# Patient Record
Sex: MALE | Race: WHITE | Employment: OTHER | URBAN - METROPOLITAN AREA
[De-identification: names, ages, dates, MRNs, and addresses within clinical notes are randomized per-mention and may not be internally consistent; named-entity substitution may affect disease eponyms.]

---

## 2017-07-08 ENCOUNTER — HOSPITAL ENCOUNTER (EMERGENCY)
Facility: CLINIC | Age: 82
Discharge: LEFT BEFORE DISCHARGE | End: 2017-07-08
Attending: EMERGENCY MEDICINE | Admitting: EMERGENCY MEDICINE
Payer: MEDICARE

## 2017-07-08 VITALS
BODY MASS INDEX: 27.82 KG/M2 | RESPIRATION RATE: 16 BRPM | OXYGEN SATURATION: 93 % | TEMPERATURE: 97.8 F | HEART RATE: 49 BPM | WEIGHT: 167 LBS | SYSTOLIC BLOOD PRESSURE: 150 MMHG | HEIGHT: 65 IN | DIASTOLIC BLOOD PRESSURE: 63 MMHG

## 2017-07-08 DIAGNOSIS — R55 SYNCOPE AND COLLAPSE: ICD-10-CM

## 2017-07-08 LAB
ANION GAP SERPL CALCULATED.3IONS-SCNC: 6 MMOL/L (ref 3–14)
BASOPHILS # BLD AUTO: 0 10E9/L (ref 0–0.2)
BASOPHILS NFR BLD AUTO: 0.3 %
BUN SERPL-MCNC: 23 MG/DL (ref 7–30)
CALCIUM SERPL-MCNC: 8.3 MG/DL (ref 8.5–10.1)
CHLORIDE SERPL-SCNC: 109 MMOL/L (ref 94–109)
CO2 SERPL-SCNC: 26 MMOL/L (ref 20–32)
CREAT SERPL-MCNC: 1.33 MG/DL (ref 0.66–1.25)
DIFFERENTIAL METHOD BLD: ABNORMAL
EOSINOPHIL # BLD AUTO: 0.5 10E9/L (ref 0–0.7)
EOSINOPHIL NFR BLD AUTO: 8 %
ERYTHROCYTE [DISTWIDTH] IN BLOOD BY AUTOMATED COUNT: 14.1 % (ref 10–15)
GFR SERPL CREATININE-BSD FRML MDRD: 51 ML/MIN/1.7M2
GLUCOSE SERPL-MCNC: 151 MG/DL (ref 70–99)
HCT VFR BLD AUTO: 34.8 % (ref 40–53)
HGB BLD-MCNC: 11.7 G/DL (ref 13.3–17.7)
IMM GRANULOCYTES # BLD: 0 10E9/L (ref 0–0.4)
IMM GRANULOCYTES NFR BLD: 0.2 %
INTERPRETATION ECG - MUSE: NORMAL
LYMPHOCYTES # BLD AUTO: 2 10E9/L (ref 0.8–5.3)
LYMPHOCYTES NFR BLD AUTO: 31.6 %
MCH RBC QN AUTO: 32.2 PG (ref 26.5–33)
MCHC RBC AUTO-ENTMCNC: 33.6 G/DL (ref 31.5–36.5)
MCV RBC AUTO: 96 FL (ref 78–100)
MONOCYTES # BLD AUTO: 0.5 10E9/L (ref 0–1.3)
MONOCYTES NFR BLD AUTO: 8.6 %
NEUTROPHILS # BLD AUTO: 3.2 10E9/L (ref 1.6–8.3)
NEUTROPHILS NFR BLD AUTO: 51.3 %
NRBC # BLD AUTO: 0 10*3/UL
NRBC BLD AUTO-RTO: 0 /100
PHENYTOIN SERPL-MCNC: 15 MG/L (ref 10–20)
PLATELET # BLD AUTO: 136 10E9/L (ref 150–450)
POTASSIUM SERPL-SCNC: 4 MMOL/L (ref 3.4–5.3)
RBC # BLD AUTO: 3.63 10E12/L (ref 4.4–5.9)
SODIUM SERPL-SCNC: 141 MMOL/L (ref 133–144)
TROPONIN I SERPL-MCNC: NORMAL UG/L (ref 0–0.04)
WBC # BLD AUTO: 6.3 10E9/L (ref 4–11)

## 2017-07-08 PROCEDURE — 80048 BASIC METABOLIC PNL TOTAL CA: CPT | Performed by: EMERGENCY MEDICINE

## 2017-07-08 PROCEDURE — 80185 ASSAY OF PHENYTOIN TOTAL: CPT | Performed by: EMERGENCY MEDICINE

## 2017-07-08 PROCEDURE — 99284 EMERGENCY DEPT VISIT MOD MDM: CPT

## 2017-07-08 PROCEDURE — 93005 ELECTROCARDIOGRAM TRACING: CPT

## 2017-07-08 PROCEDURE — 85025 COMPLETE CBC W/AUTO DIFF WBC: CPT | Performed by: EMERGENCY MEDICINE

## 2017-07-08 PROCEDURE — 84484 ASSAY OF TROPONIN QUANT: CPT | Performed by: EMERGENCY MEDICINE

## 2017-07-08 RX ORDER — LISINOPRIL 10 MG/1
10 TABLET ORAL EVERY MORNING
Status: ON HOLD | COMMUNITY
Start: 2017-04-06 | End: 2021-01-01

## 2017-07-08 RX ORDER — PHENYTOIN SODIUM 100 MG/1
100 CAPSULE, EXTENDED RELEASE ORAL 3 TIMES DAILY
Status: ON HOLD | COMMUNITY
Start: 2017-04-06 | End: 2021-01-01

## 2017-07-08 RX ORDER — ATORVASTATIN CALCIUM 40 MG/1
40 TABLET, FILM COATED ORAL EVERY EVENING
Status: ON HOLD | COMMUNITY
Start: 2017-04-06 | End: 2019-11-16

## 2017-07-08 RX ORDER — LIDOCAINE 40 MG/G
CREAM TOPICAL
Status: DISCONTINUED | OUTPATIENT
Start: 2017-07-08 | End: 2017-07-08 | Stop reason: HOSPADM

## 2017-07-08 ASSESSMENT — ENCOUNTER SYMPTOMS
CONFUSION: 0
VOMITING: 0
FEVER: 0
HEADACHES: 0
TREMORS: 0
WOUND: 0
SPEECH DIFFICULTY: 0

## 2017-07-09 NOTE — ED NOTES
Pt called neighbor for ride, escorted to lobby in wheelchair.  Pt signed out against medical advice.

## 2017-07-09 NOTE — DISCHARGE INSTRUCTIONS
Discharge Instructions  Syncope    Syncope (fainting) is a sudden, short loss of consciousness (passing out spell). People will usually fall to the ground when they faint or slump over if seated.  At this time your doctor does not find that your fainting spell is a sign of anything dangerous or life-threatening.  However, sometimes the signs of serious illness do not show up right away.  If you have new or worse symptoms, you may need to be seen again in the Emergency Department or by your primary doctor. Be sure to follow up as directed, or at least within 1 week.      Return to the Emergency Department if:    You faint again.     You have any significant bleeding.    You have chest pain or fast heartbeat, or if your heartbeat is irregular or skipping beats.    You feel short of breath.    You cough up any blood.    You have belly (abdominal) pain or unusual back pain.    You have ongoing vomiting (throwing up) or diarrhea, or have a black or tarry bowel movement or blood in the bowels or blood in your vomit.    You have a fever over 101 degrees.    You lose feeling or cannot move a part of your body or cannot talk normally.    You are confused, have a headache, cannot see well, or have a seizure.    DO NOT DRIVE. CALL 911 INSTEAD!    What can I do to help myself?    Follow any specific instructions that your provider discussed with you.    If you feel light-headed, make sure to sit down right away, even if you have to sit on the floor.    Follow up with your regular medical doctor as discussed for further management. This may include lowering your blood pressure medications, insulin or other diabetic medications, checking your blood sugar more frequently, and drinking more fluids, taking medicines for vomiting or diarrhea or getting up slower.  If you were given a prescription for medicine here today, be sure to read all of the information (including the package insert) that comes with your prescription.  This  will include important information about the medicine, its side effects, and any warnings that you need to know about.  The pharmacist who fills the prescription can provide more information and answer questions you may have about the medicine.  If you have questions or concerns that the pharmacist cannot address, please call or return to the Emergency Department.     Opioid Medication Information    Pain medications are among the most commonly prescribed medicines, so we are including this information for all our patients. If you did not receive pain medication or get a prescription for pain medicine, you can ignore it.     You may have been given a prescription for an opioid (narcotic) pain medicine and/or have received a pain medicine while here in the Emergency Department. These medicines can make you drowsy or impaired. You must not drive, operate dangerous equipment, or engage in any other dangerous activities while taking these medications. If you drive while taking these medications, you could be arrested for DUI, or driving under the influence. Do not drink any alcohol while you are taking these medications.     Opioid pain medications can cause addiction. If you have a history of chemical dependency of any type, you are at a higher risk of becoming addicted to pain medications.  Only take these prescribed medications to treat your pain when all other options have been tried. Take it for as short a time and as few doses as possible. Store your pain pills in a secure place, as they are frequently stolen and provide a dangerous opportunity for children or visitors in your house to start abusing these powerful medications. We will not replace any lost or stolen medicine.  As soon as your pain is better, you should flush all your remaining medication.     Many prescription pain medications contain Tylenol  (acetaminophen), including Vicodin , Tylenol #3 , Norco , Lortab , and Percocet .  You should not take any  extra pills of Tylenol  if you are using these prescription medications or you can get very sick.  Do not ever take more than 3000 mg of acetaminophen in any 24 hour period.    All opioids tend to cause constipation. Drink plenty of water and eat foods that have a lot of fiber, such as fruits, vegetables, prune juice, apple juice and high fiber cereal.  Take a laxative if you don t move your bowels at least every other day. Miralax , Milk of Magnesia, Colace , or Senna  can be used to keep you regular.      Remember that you can always come back to the Emergency Department if you are not able to see your regular doctor in the amount of time listed above, if you get any new symptoms, or if there is anything that worries you.

## 2017-07-09 NOTE — ED NOTES
Pt presents via EMS for evaluation of syncopal episode with stool incontinence. Pt was working out in the yard, felt like he was going to have a BM, then had a syncopal episode. Episode was witnessed by neighbor, who thought they saw seizure like activity. Pt denies any chest pain, shortness of breath, lightheadedness or dizziness.

## 2017-07-09 NOTE — ED PROVIDER NOTES
"  History     Chief Complaint:  Loss of Consciousness    HPI   Kofi Haddad is a 84 year old male with a history of blood pressure problems and seizures who presents to the emergency department via EMS for evaluation of loss of consciousness. Just prior to presentation, the patient was helping his neighbor trying to get wire into a gorge. He states that he was holding the gorge for a while and lost track of time. He reports having to go to the bathroom but did not make it and loss consciousness for an unspecified amount of time. In regards to his neighbors calling EMS, he states that \"it was all for nothing.\" He is adamant that he does not want to stay in the ED for any reason.    Allergies:  NKDA    Medications:    The patient is currently on no regular medications.    Past Medical History:    The patient denies any significant past medical history.    Past Surgical History:    The patient does not have any pertinent past surgical history.    Family History:    No past pertinent family history.    Social History:  The patient denies tobacco or alcohol use.  Marital Status:       Review of Systems   Constitutional: Negative for fever.   Gastrointestinal: Negative for vomiting.   Skin: Negative for wound.   Neurological: Positive for syncope. Negative for tremors, speech difficulty and headaches.   Psychiatric/Behavioral: Negative for confusion.   All other systems reviewed and are negative.      Physical Exam     Patient Vitals for the past 24 hrs:   BP Temp Temp src Pulse Heart Rate Resp SpO2 Height Weight   07/08/17 1917 150/76 97.8  F (36.6  C) Oral (!) 49 - 16 99 % 1.651 m (5' 5\") 75.8 kg (167 lb)          Physical Exam   Constitutional: He is oriented to person, place, and time. He appears well-developed.   HENT:   Head: Normocephalic and atraumatic.   Right Ear: External ear normal.   Mouth/Throat: Oropharynx is clear and moist.   Eyes: Conjunctivae and EOM are normal. Pupils are equal, round, " and reactive to light.   Neck: Normal range of motion. Neck supple. No JVD present.   Cardiovascular: Normal rate and regular rhythm.    Murmur heard.   Systolic murmur is present with a grade of 5/6   Pulmonary/Chest: Effort normal and breath sounds normal.   Abdominal: Soft. Bowel sounds are normal. He exhibits no distension. There is no tenderness. There is no rebound.   Musculoskeletal: Normal range of motion.   Lymphadenopathy:     He has no cervical adenopathy.   Neurological: He is alert and oriented to person, place, and time. He displays normal reflexes. No cranial nerve deficit. He exhibits normal muscle tone. Coordination normal.   Skin: Skin is warm and dry.   Psychiatric: He has a normal mood and affect. His behavior is normal. Judgment normal.   Nursing note and vitals reviewed.        Emergency Department Course   ECG:  Indication: Loss of Consciousness  Time: 19:16  Vent. Rate 49 bpm. PA interval 136. QRS duration 132. QT/QTc 466/420. P-R-T axis 23 -16 8. Sinus bradycardia. Right bundle branch block. Abnormal ECG.  Read time: 19:44    Laboratory:  CBC: WBC: 6.3, HGB: 11.7 (L), PLT: 136 (L)  BMP: Glucose 151 (H), Creatinine 1.33 (H), GFR 51 (L), Calcium 8.3 (L) o/w WNL    Troponin I: <0.015  Phenytoin Level: 15.0    Emergency Department Course:  The patient presents via EMS.  Nursing notes and vitals reviewed.  I performed an exam of the patient as documented above.    Blood drawn. This was sent to the lab for further testing, results above.    19:27 I talked with the patient's daughter about the history of the patient.    19:41 I reevaluated the patient and provided an update in regards to his ED course.      20:21 I called the patient's daughter back and informed her of the plan.    21:05 I informed the daughter that the patient will be discharged to his neighbor.    I personally reviewed the laboratory results with the Patient and answered all related questions prior to discharge.   Findings and  plan explained to the Patient. Patient discharged home with instructions regarding supportive care, medications, and reasons to return. The importance of close follow-up was reviewed.       Impression & Plan      Medical Decision Making:  Kofi Haddad is a 84 year old male who presents after an episode with his neighbor. The patient does have a history of a seizure disorder and is on Dilantin. EMS does mention the possibility of seizure though it does not sound like there was a post ictal state. The patient's EKG shows significant bradycardia at the rate of only 49. This seemed to improve with time. The patient is not on a beta blocker. The patient's exam also consists of a blowing systolic murmur and has a history of known aortic stenosis. He was last imaged more than 11 years ago. The patient was advised admission. My clinical concerns are for cardiac arrhythmia and symptomatic aortic stenosis. The patient adamantly refuses this. I do think that he is within capability to decide about his own health care decision making. His daughter did call from [place] and she did tell me that he never wants to stay in the hospital. The patient was advised of the risks of missing symptomatic aortic stenosis including sudden cardiac death. The patient refused to stay and signed out against medical advise.    1. Syncopy versus seizure  2. Bradycardia resolved  3. History of aortic stenosis      Diagnosis:    ICD-10-CM    1. Syncope and collapse R55        Disposition:  discharged to home    Discharge Medications:  Discharge Medication List as of 7/8/2017  9:39 PM          Blake RADFORD, am serving as a scribe on 7/8/2017 at 7:19 PM to personally document services performed by Hemanth Alvarez MD based on my observations and the provider's statements to me.     7/8/2017   [facility] EMERGENCY DEPARTMENT       Hemanth Alvarez MD  07/14/17 8922

## 2017-07-09 NOTE — ED NOTES
Bed: ED23  Expected date: 7/8/17  Expected time: 7:02 PM  Means of arrival: Ambulance  Comments:  BV1

## 2018-09-30 ENCOUNTER — HOSPITAL ENCOUNTER (INPATIENT)
Facility: CLINIC | Age: 83
LOS: 1 days | Discharge: LEFT BEFORE DISCHARGE | DRG: 307 | End: 2018-10-01
Attending: EMERGENCY MEDICINE | Admitting: HOSPITALIST
Payer: MEDICARE

## 2018-09-30 ENCOUNTER — APPOINTMENT (OUTPATIENT)
Dept: CT IMAGING | Facility: CLINIC | Age: 83
DRG: 307 | End: 2018-09-30
Attending: EMERGENCY MEDICINE
Payer: MEDICARE

## 2018-09-30 ENCOUNTER — APPOINTMENT (OUTPATIENT)
Dept: GENERAL RADIOLOGY | Facility: CLINIC | Age: 83
DRG: 307 | End: 2018-09-30
Attending: EMERGENCY MEDICINE
Payer: MEDICARE

## 2018-09-30 DIAGNOSIS — I15.8 OTHER SECONDARY HYPERTENSION: ICD-10-CM

## 2018-09-30 DIAGNOSIS — R55 SYNCOPE, UNSPECIFIED SYNCOPE TYPE: ICD-10-CM

## 2018-09-30 LAB
ANION GAP SERPL CALCULATED.3IONS-SCNC: 2 MMOL/L (ref 3–14)
BASOPHILS # BLD AUTO: 0 10E9/L (ref 0–0.2)
BASOPHILS NFR BLD AUTO: 0.4 %
BUN SERPL-MCNC: 21 MG/DL (ref 7–30)
CALCIUM SERPL-MCNC: 8.6 MG/DL (ref 8.5–10.1)
CHLORIDE SERPL-SCNC: 106 MMOL/L (ref 94–109)
CO2 SERPL-SCNC: 32 MMOL/L (ref 20–32)
CREAT SERPL-MCNC: 1.23 MG/DL (ref 0.66–1.25)
DIFFERENTIAL METHOD BLD: ABNORMAL
EOSINOPHIL # BLD AUTO: 0.4 10E9/L (ref 0–0.7)
EOSINOPHIL NFR BLD AUTO: 7.9 %
ERYTHROCYTE [DISTWIDTH] IN BLOOD BY AUTOMATED COUNT: 14.2 % (ref 10–15)
GFR SERPL CREATININE-BSD FRML MDRD: 56 ML/MIN/1.7M2
GLUCOSE SERPL-MCNC: 117 MG/DL (ref 70–99)
HCT VFR BLD AUTO: 40.9 % (ref 40–53)
HGB BLD-MCNC: 13.7 G/DL (ref 13.3–17.7)
IMM GRANULOCYTES # BLD: 0 10E9/L (ref 0–0.4)
IMM GRANULOCYTES NFR BLD: 0.2 %
LYMPHOCYTES # BLD AUTO: 1.7 10E9/L (ref 0.8–5.3)
LYMPHOCYTES NFR BLD AUTO: 34.2 %
MCH RBC QN AUTO: 32.2 PG (ref 26.5–33)
MCHC RBC AUTO-ENTMCNC: 33.5 G/DL (ref 31.5–36.5)
MCV RBC AUTO: 96 FL (ref 78–100)
MONOCYTES # BLD AUTO: 0.5 10E9/L (ref 0–1.3)
MONOCYTES NFR BLD AUTO: 10 %
NEUTROPHILS # BLD AUTO: 2.4 10E9/L (ref 1.6–8.3)
NEUTROPHILS NFR BLD AUTO: 47.3 %
NRBC # BLD AUTO: 0 10*3/UL
NRBC BLD AUTO-RTO: 0 /100
PLATELET # BLD AUTO: 155 10E9/L (ref 150–450)
POTASSIUM SERPL-SCNC: 4.4 MMOL/L (ref 3.4–5.3)
RBC # BLD AUTO: 4.25 10E12/L (ref 4.4–5.9)
SODIUM SERPL-SCNC: 140 MMOL/L (ref 133–144)
TROPONIN I SERPL-MCNC: <0.015 UG/L (ref 0–0.04)
TROPONIN I SERPL-MCNC: <0.015 UG/L (ref 0–0.04)
WBC # BLD AUTO: 5.1 10E9/L (ref 4–11)

## 2018-09-30 PROCEDURE — 85025 COMPLETE CBC W/AUTO DIFF WBC: CPT | Performed by: EMERGENCY MEDICINE

## 2018-09-30 PROCEDURE — 99223 1ST HOSP IP/OBS HIGH 75: CPT | Mod: AI | Performed by: HOSPITALIST

## 2018-09-30 PROCEDURE — A9270 NON-COVERED ITEM OR SERVICE: HCPCS | Mod: GY | Performed by: EMERGENCY MEDICINE

## 2018-09-30 PROCEDURE — A9270 NON-COVERED ITEM OR SERVICE: HCPCS | Mod: GY | Performed by: HOSPITALIST

## 2018-09-30 PROCEDURE — 25000132 ZZH RX MED GY IP 250 OP 250 PS 637: Mod: GY | Performed by: HOSPITALIST

## 2018-09-30 PROCEDURE — 25000132 ZZH RX MED GY IP 250 OP 250 PS 637: Mod: GY | Performed by: EMERGENCY MEDICINE

## 2018-09-30 PROCEDURE — 70450 CT HEAD/BRAIN W/O DYE: CPT

## 2018-09-30 PROCEDURE — 93005 ELECTROCARDIOGRAM TRACING: CPT

## 2018-09-30 PROCEDURE — 80048 BASIC METABOLIC PNL TOTAL CA: CPT | Performed by: EMERGENCY MEDICINE

## 2018-09-30 PROCEDURE — 84484 ASSAY OF TROPONIN QUANT: CPT | Performed by: EMERGENCY MEDICINE

## 2018-09-30 PROCEDURE — 25000128 H RX IP 250 OP 636: Performed by: HOSPITALIST

## 2018-09-30 PROCEDURE — 99222 1ST HOSP IP/OBS MODERATE 55: CPT | Mod: 25 | Performed by: INTERNAL MEDICINE

## 2018-09-30 PROCEDURE — 36415 COLL VENOUS BLD VENIPUNCTURE: CPT | Performed by: HOSPITALIST

## 2018-09-30 PROCEDURE — 71046 X-RAY EXAM CHEST 2 VIEWS: CPT

## 2018-09-30 PROCEDURE — 99285 EMERGENCY DEPT VISIT HI MDM: CPT | Mod: 25

## 2018-09-30 PROCEDURE — 84484 ASSAY OF TROPONIN QUANT: CPT | Performed by: HOSPITALIST

## 2018-09-30 PROCEDURE — 12000007 ZZH R&B INTERMEDIATE

## 2018-09-30 RX ORDER — CLONIDINE HYDROCHLORIDE 0.1 MG/1
0.1 TABLET ORAL ONCE
Status: COMPLETED | OUTPATIENT
Start: 2018-09-30 | End: 2018-09-30

## 2018-09-30 RX ORDER — PHENYTOIN SODIUM 100 MG/1
100 CAPSULE, EXTENDED RELEASE ORAL 3 TIMES DAILY
Status: DISCONTINUED | OUTPATIENT
Start: 2018-09-30 | End: 2018-10-01 | Stop reason: HOSPADM

## 2018-09-30 RX ORDER — ONDANSETRON 2 MG/ML
4 INJECTION INTRAMUSCULAR; INTRAVENOUS EVERY 6 HOURS PRN
Status: DISCONTINUED | OUTPATIENT
Start: 2018-09-30 | End: 2018-10-01 | Stop reason: HOSPADM

## 2018-09-30 RX ORDER — CALCIUM CARBONATE 500 MG/1
500 TABLET, CHEWABLE ORAL DAILY PRN
Status: DISCONTINUED | OUTPATIENT
Start: 2018-09-30 | End: 2018-10-01 | Stop reason: HOSPADM

## 2018-09-30 RX ORDER — CALCIUM CARBONATE 500 MG/1
1 TABLET, CHEWABLE ORAL DAILY PRN
Status: ON HOLD | COMMUNITY
End: 2021-01-01

## 2018-09-30 RX ORDER — ATORVASTATIN CALCIUM 40 MG/1
40 TABLET, FILM COATED ORAL EVERY EVENING
Status: DISCONTINUED | OUTPATIENT
Start: 2018-09-30 | End: 2018-10-01 | Stop reason: HOSPADM

## 2018-09-30 RX ORDER — LISINOPRIL 10 MG/1
10 TABLET ORAL EVERY MORNING
Status: DISCONTINUED | OUTPATIENT
Start: 2018-10-01 | End: 2018-10-01

## 2018-09-30 RX ORDER — NALOXONE HYDROCHLORIDE 0.4 MG/ML
.1-.4 INJECTION, SOLUTION INTRAMUSCULAR; INTRAVENOUS; SUBCUTANEOUS
Status: DISCONTINUED | OUTPATIENT
Start: 2018-09-30 | End: 2018-10-01 | Stop reason: HOSPADM

## 2018-09-30 RX ORDER — HYDRALAZINE HYDROCHLORIDE 20 MG/ML
10 INJECTION INTRAMUSCULAR; INTRAVENOUS EVERY 4 HOURS PRN
Status: DISCONTINUED | OUTPATIENT
Start: 2018-09-30 | End: 2018-10-01 | Stop reason: HOSPADM

## 2018-09-30 RX ORDER — ONDANSETRON 4 MG/1
4 TABLET, ORALLY DISINTEGRATING ORAL EVERY 6 HOURS PRN
Status: DISCONTINUED | OUTPATIENT
Start: 2018-09-30 | End: 2018-10-01 | Stop reason: HOSPADM

## 2018-09-30 RX ADMIN — PHENYTOIN SODIUM 100 MG: 100 CAPSULE ORAL at 22:59

## 2018-09-30 RX ADMIN — ATORVASTATIN CALCIUM 40 MG: 40 TABLET, FILM COATED ORAL at 20:06

## 2018-09-30 RX ADMIN — HYDRALAZINE HYDROCHLORIDE 10 MG: 20 INJECTION INTRAMUSCULAR; INTRAVENOUS at 20:10

## 2018-09-30 RX ADMIN — CLONIDINE 1 PATCH: 0.3 PATCH TRANSDERMAL at 11:51

## 2018-09-30 RX ADMIN — PHENYTOIN SODIUM 100 MG: 100 CAPSULE ORAL at 17:13

## 2018-09-30 RX ADMIN — CLONIDINE HYDROCHLORIDE 0.1 MG: 0.1 TABLET ORAL at 10:50

## 2018-09-30 ASSESSMENT — ENCOUNTER SYMPTOMS
DIARRHEA: 0
VOMITING: 0
SHORTNESS OF BREATH: 0

## 2018-09-30 ASSESSMENT — ACTIVITIES OF DAILY LIVING (ADL)
ADLS_ACUITY_SCORE: 8
ADLS_ACUITY_SCORE: 10

## 2018-09-30 NOTE — H&P
[facility]    History and Physical  Hospitalist     Date of Admission:  9/30/2018  Date of Service (when I saw the patient): 09/30/18  Provider: Erick Carey MD      Chief Complaint   Fainting in [employer]    History is obtained from the patient, electronic health record and emergency department physician    History of Present Illness   Kofi Haddad is a 85 year old male who has a past medical history of Prostate Cancer s/p prostatectomy; High cholesterol; Hypertension; and Seizures (H). He presents to ED with elevated BP after a fainting episode (syncope) in his [employer] this AM. According   to his daughter who is in the room at the moment of my visit, 1 year ago he had a similar episode in a neighbor's house.   He denies loss of consciousness but it is not totally reliable because he wants to leave to home ASAP.  He denies premonitory signs.  No chest pain, no shortness of breath, no lightheaded or dizziness.  No sweatiness.  No visual abnormalities, no weakness, numbness or tingling anywhere in his body.  No headache.  No nausea or vomiting.  He was transported to the emergency department by the EMS crew who found conscious on the scene but bradycardic.  He is not taking beta-blockers.  He has been bradycardic since arrival to the hospital.  His blood pressure 223/77, heart rate 45, respiratory rate 16 and oxygen saturation 98% not on supplement.  He was treated in the emergency department with clonidine p.o. and then a patch was placed. According to Dr Newosme from ED, EMS' crew was told by parishioners in [employer] that no seizure activity was witnessed.   His lab workup is significant for:  Normal CBC.  Chemistry with creatinine 1.23, GFR 56 and rest of the values within normal limits.  First troponin nondetectable.  Glycemia 117..  EKG shows sinus bradycardia with first-degree AV block and RBBB.    Past Medical History    I have reviewed this patient's medical history and updated it with  pertinent information if needed.   Past Medical History:   Diagnosis Date     Cancer (H)      High cholesterol      Hypertension      Seizures (H)        Assessment & Plan   Kofi Haddad is a 85 year old male who presents with high blood pressure, bradycardia after fainting episode in his [employer] today.  In the past he has had similar events by family report.  He has a history of seizure activity well controlled in the last 10 years on Keppra, essential hypertension, hyperlipidemia and history of prostate cancer status post prostatectomy.    1.  Syncope, unclear etiology. Given degree of bradycardia in the absence of beta-blocker it may be a cardiogenic syncope due to brain hypoperfusion.  2.  Hypertensive urgency. Unclear whether it is cause of consequence. SBP very high on arrival.   3.  Essential hypertension.   4.  History of seizures.  5.  HLP.  6.  H/O Prostate cancer.    Plan.  Inpatient.  Telemetry.  Cardiac diet.  Echocardiogram.  Serial troponin.  Falls precautions.  Up with assistance.  Cardiology consult.  Resume home med.  PT/OT A&P.       Code Status   DNR    Primary Care Physician   [facility]      Past Surgical History   Past Surgical History:   Procedure Laterality Date     GENITOURINARY SURGERY         Prior to Admission Medications   Prior to Admission Medications   Prescriptions Last Dose Informant Patient Reported? Taking?   atorvastatin (LIPITOR) 40 MG tablet 9/29/2018 at pm  Yes Yes   Sig: Take 40 mg by mouth every evening    calcium carbonate (TUMS) 500 MG chewable tablet 9/29/2018 at na  Yes Yes   Sig: Take 1 chew tab by mouth daily as needed for heartburn   lisinopril (PRINIVIL/ZESTRIL) 10 MG tablet 9/30/2018 at am  Yes Yes   Sig: Take 10 mg by mouth every morning    phenytoin (DILANTIN) 100 MG CR capsule 9/30/2018 at am  Yes Yes   Sig: Take 100 mg by mouth 3 times daily       Facility-Administered Medications: None     Allergies   No Known Allergies    Social History   I  have personally reviewed the social history with the patient showing.  Social History   Substance Use Topics     Smoking status: Former Smoker     Smokeless tobacco: Never Used     Alcohol use Not on file     Family History   I have reviewed this patient's family history and updated it with pertinent information if needed.   No family history on file.    Review of Systems   Except as noted in the HPI, a 12-system Review of Systems was found to be negative.  Specifically:     General:  No chronic fatigue, unexpected weight gain or weight loss.  ENT:  No ear or sinus infections.  Respiratory:  No wheezing, dyspnea on exertion,  or chronic cough.  CV:  No chest pain, cyanosis, palpitations, dizziness. Fainting.  GI:  No abdominal pain, reflux symptoms, nausea, vomiting or diarrhea.  Allergy / Immunology:  No allergy symptoms (itching, sneezing, watery eyes, rhinorrhea, congestion, or post-nasal drip).  Endocrine:  No hot or cold intolerance, excessive sweating, excessive thirst, or frequent urination.  Skin:  No problems with rashes, sensitive skin, or eczema.  Neurologic:  No headaches, weakness, numbness, dizziness, or seizures.  Musculoskeletal:  No muscle or joint pain.  Lymphatic:  No swollen lymph nodes.  Psychiatric:  No depression, anxiety, or sleep disorder.    Physical Exam   Vital Signs with Ranges  Temp:  [97.8  F (36.6  C)] 97.8  F (36.6  C)  Pulse:  [45] 45  Heart Rate:  [42-53] 49  Resp:  [10-22] 10  BP: (169-234)/() 188/97  SpO2:  [95 %-100 %] 97 %  0 lbs 0 oz    GEN:  Alert, oriented x 3, appears comfortable, NAD.  HEENT:  Normocephalic/atraumatic, no scleral icterus, no nasal discharge, mouth moist.  CV:  Bradycardic rhythm, EAMON II/VI all over with radiation to neck vessels. No JVD.  S1 + S2 noted, no S3 or S4.  LUNGS:  Clear to auscultation bilaterally without rales/rhonchi/wheezing/retractions.  Symmetric chest rise on inhalation noted.  ABD:  Active bowel sounds, soft,  non-tender/non-distended.  No rebound/guarding/rigidity.  EXT:  No edema or cyanosis.  No joint synovitis noted.  SKIN:  Dry to touch, no exanthems noted in the visualized areas.       Data   I personally reviewed no images or EKG's today.  Results for orders placed or performed during the hospital encounter of 09/30/18 (from the past 24 hour(s))   EKG 12 lead   Result Value Ref Range    Interpretation ECG Click View Image link to view waveform and result    CBC with platelets differential   Result Value Ref Range    WBC 5.1 4.0 - 11.0 10e9/L    RBC Count 4.25 (L) 4.4 - 5.9 10e12/L    Hemoglobin 13.7 13.3 - 17.7 g/dL    Hematocrit 40.9 40.0 - 53.0 %    MCV 96 78 - 100 fl    MCH 32.2 26.5 - 33.0 pg    MCHC 33.5 31.5 - 36.5 g/dL    RDW 14.2 10.0 - 15.0 %    Platelet Count 155 150 - 450 10e9/L    Diff Method Automated Method     % Neutrophils 47.3 %    % Lymphocytes 34.2 %    % Monocytes 10.0 %    % Eosinophils 7.9 %    % Basophils 0.4 %    % Immature Granulocytes 0.2 %    Nucleated RBCs 0 0 /100    Absolute Neutrophil 2.4 1.6 - 8.3 10e9/L    Absolute Lymphocytes 1.7 0.8 - 5.3 10e9/L    Absolute Monocytes 0.5 0.0 - 1.3 10e9/L    Absolute Eosinophils 0.4 0.0 - 0.7 10e9/L    Absolute Basophils 0.0 0.0 - 0.2 10e9/L    Abs Immature Granulocytes 0.0 0 - 0.4 10e9/L    Absolute Nucleated RBC 0.0    Basic metabolic panel   Result Value Ref Range    Sodium 140 133 - 144 mmol/L    Potassium 4.4 3.4 - 5.3 mmol/L    Chloride 106 94 - 109 mmol/L    Carbon Dioxide 32 20 - 32 mmol/L    Anion Gap 2 (L) 3 - 14 mmol/L    Glucose 117 (H) 70 - 99 mg/dL    Urea Nitrogen 21 7 - 30 mg/dL    Creatinine 1.23 0.66 - 1.25 mg/dL    GFR Estimate 56 (L) >60 mL/min/1.7m2    GFR Estimate If Black 68 >60 mL/min/1.7m2    Calcium 8.6 8.5 - 10.1 mg/dL   Troponin I   Result Value Ref Range    Troponin I ES <0.015 0.000 - 0.045 ug/L   XR Chest 2 Views    Narrative    CHEST TWO VIEWS September 30, 2018 10:32 AM     HISTORY: Syncope.    COMPARISON: Frontal  chest x-ray 4/5/2006.     FINDINGS: The cardiac silhouette, pulmonary vasculature, lungs and  pleural spaces are within normal limits.      Impression    IMPRESSION: Clear lungs.     AYSHA MENJIVAR MD   Head CT w/o contrast    Narrative    CT OF THE HEAD WITHOUT CONTRAST September 30, 2018 12:21 PM     HISTORY: Hypertension.    TECHNIQUE: 5 mm thick axial CT images of the head were acquired  without IV contrast material. Radiation dose for this scan was reduced  using automated exposure control, adjustment of the mA and/or kV  according to patient size, or iterative reconstruction technique.    COMPARISON: Head CT 4/3/2006.    FINDINGS: There is moderate diffuse cerebral volume loss. There are  subtle patchy areas of decreased density in the cerebral white matter  bilaterally that are consistent with sequela of chronic small vessel  ischemic disease.    The ventricles and basal cisterns are within normal limits in  configuration given the degree of cerebral volume loss. There is no  midline shift. There are no extra-axial fluid collections.    No intracranial hemorrhage, mass or recent infarct.    The visualized paranasal sinuses are well-aerated. There is no  mastoiditis. There are no fractures of the visualized bones.      Impression    IMPRESSION: Diffuse cerebral volume loss and cerebral white matter  changes consistent with chronic small vessel ischemic disease. No  evidence for acute intracranial pathology.         AYSHA MENJIVAR MD       Disclaimer: This note consists of symbols derived from keyboarding, dictation and/or voice recognition software. As a result, there may be errors in the script that have gone undetected. Please consider this when interpreting information found in this chart.

## 2018-09-30 NOTE — PLAN OF CARE
A&O x 4. Up with assist x 1. Denies pain. SB (HR 38-40's) on tele. BP elevated. Clonidine patch R chest.   PT/OT/Cardiology consult.

## 2018-09-30 NOTE — CONSULTS
Consult Date:  09/30/2018      REQUESTING PHYSICIAN:  Erick Carey MD       REASON FOR CONSULTATION:  Syncope, bradycardia.      CHIEF COMPLAINT:  Dizziness, near syncope/syncope.      HISTORY OF PRESENT ILLNESS:  Mr. Haddad is a pleasant 85-year-old gentleman with history of known severe aortic stenosis, history of bradycardia, history of near-syncope about a year ago, other comorbidities of hypertension, history of seizure disorder, history of prostate cancer, status post prostatectomy.  The patient is admitted because of a history of near-syncope/syncope.  He was in [employer] today and patient tells me that there was a band playing he was standing there for about 15 minutes and then he started feeling dizzy.  He felt it awkward to lie down because others was standing and I think after 2-3 minutes of feeling like this, he eventually fell down.  Although the patient denies that he actually completely passed out, on more detailed conversation with him, I feel that he was unconscious maybe for a few seconds.  He denies any seizure-like activity, any bowel or bladder accident.  Remarkably, the patient had a similar episode of near-syncope about a year ago.        He was seen by Dr. Herbert, his cardiologist, and at that time he had a Holter evaluation and was noted to have bradycardia and chronotropic incompetence.  He also had severe aortic stenosis.  It was felt to be symptomatic severe aortic stenosis.  Option of TAVR was discussed with the patient and patient declined any invasive procedures.  Remarkably, the patient did not recall too much about this conversation, although family did confirm that these conversations did take place and patient declined invasive testing.  Even today, the patient wanted to go home.  Echocardiogram done a year ago showed severe aortic stenosis with a mean gradient of 43 mmHg, mild aortic regurgitation, normal LV function at 65% ejection fraction.        Today upon presentation, the  patient was found to have significant hypertension with systolic blood pressure 223, diastolic 77.  His heart rate was in mid 40s.  In fact, according to the ER notes, EMS found him to have heart rate in mid 40s, sinus bradycardia.  At home it looks like he takes lisinopril 10 mg daily for hypertension.  He was started on clonidine patch and his blood pressure is much better now.  Telemetry shows sinus bradycardia with heart rate in mid-40s.  EKG was personally reviewed by me shows sinus bradycardia with right bundle branch block.  This appears similar to previous EKG done last year.      REVIEW OF SYSTEMS:  A complete review of systems was performed.  The patient tells me that he climbs stairs up and down as he has a 2-story home.  He also does grass morning.  He has noticed some slowing down over the last 1-2 years.  If he climbs stairs fast, he does get a little short of breath.  No chest discomfort at rest or with physical activity.      PAST MEDICAL HISTORY:   1.  Known severe aortic stenosis, follows Dr. Beth Herbert in Cardiology, and in the past, the patient has declined TAVR.   2.  Hypertension.   3.  History of seizure disorder.   4.  History of prostate cancer.      SOCIAL HISTORY:  The patient denies any current tobacco abuse.      FAMILY HISTORY:  Reviewed and noncontributory.      ALLERGIES:  NO KNOWN DRUG ALLERGIES.      MEDICATIONS:     1.  Clonidine patch.  The patient already received a dose of clonidine.   2.  Lisinopril 10 mg daily.   3.  Phenytoin.   4.  Lipitor 40 mg daily.      PHYSICAL EXAMINATION:   VITAL SIGNS:  Blood pressure 150/69 and heart rate 42, respiratory rate 12, 97% on room air.   GENERAL:  The patient appears pleasant, comfortable.   NECK:  Normal JVP, no bruits.   CARDIOVASCULAR:  There is grade 3/6 ejection systolic murmur with very late peaking, S2 is not heard.  It is very soft.  No S3, S4, rub or gallop.   RESPIRATORY:  Clear to auscultation bilaterally.   GASTROINTESTINAL  SYSTEM:  Abdomen soft, nontender.   EXTREMITIES:  No pitting pedal edema.   NEUROLOGIC:  Alert, oriented x3.   PSYCHIATRIC:  Normal affect.   SKIN:  No obvious rash.   HEENT:  No pallor or icterus.      EKG as noted above.      LABORATORY:  Shows troponin less than 0.015, creatinine 1.23.  CBC essentially normal.        IMAGING:  CT head, diffuse cerebral volume loss with cerebral white matter changes consistent with small vessel ischemic disease.      ASSESSMENT AND PLAN:  A pleasant 85-year-old gentleman with known severe aortic stenosis which was diagnosed at least a year ago.  Symptomatic at that time.  Follows with Dr. Herbert in Cardiology in [facility] and has declined diver.  The patient also has other cause of hypertension.  The patient is now admitted with syncope.  He was also found to have significant hypertension.        I had a long discussion with the patient regarding the nature of his presentation which is concerning for possible arrhythmia like a bradyarrhythmia leading to his symptoms.  Also, underlying severe aortic stenosis could very well cause his symptoms, although it was not particularly exertional related.  In fact, the patient was surprised to hear about severe aortic stenosis.  He does not recall about it initially, but the family helped him remember it.  I discussed natural history of symptomatic severe aortic stenosis including increased risk of sudden cardiac death failure. The patient has not decided whether he wants an invasive procedure done like TAVR.  I agree with echocardiogram.        Given underlying sinus bradycardia, clonidine is not an optimal choice.  Consider using alternative medications like hydralazine, further increasing dose of lisinopril.  Diuretics can be used like hydrochlorothiazide and spironolactone can also be used, but I will defer further management of hypertension to inpatient medicine team as they are.  At this time, there is no clearcut  indication of pacemaker, but underlying conduction system disease cannot be completely ruled out.  I agree with continuing telemetry.   1.  Admitted with syncope.  This could be due to underlying severe aortic stenosis.  Bradyarrhythmia is also a possibility.   2.  Known severe aortic stenosis, symptomatic with dyspnea on exertion, syncope and overall gradual slowing down.  The patient follows with Dr. Herbert in Cardiology.  In the past has declined TAVR workup.   3.  Hypertension significantly hypertensive at admission.  Management as per inpatient medicine team.      RECOMMENDATIONS:   1.  Agree with echocardiogram and telemetry.   2.  Consider using alternative antihypertensive medication in place of clonidine as it can further worsen bradycardia.  Can consider increasing dose of lisinopril or using hydralazine, and even diuretics.  I would avoid using preload reduction agents like nitrates given underlying severe aortic stenosis.      Thank you for involving me in the care of Mr. Gómez.  Eventually, the patient can follow with his primary cardiologist if he changes his mind regarding TAVR/aortic valve replacement.  Plan was extensively discussed with the patient and his family.         CITLALI MILLIGAN MD             D: 2018   T: 2018   MT: ESTEPHANIE      Name:     BABS MEYER   MRN:      [id number]        Account:       [id number]   :      1933           Consult Date:  2018      Document: [id number]

## 2018-09-30 NOTE — ED TRIAGE NOTES
Patient with episode of syncope while at [employer]. He denies hitting his head, denies other injuries. Heart rate found to be 40's, NSB. He has a history of seizures, states he took his seizure medication as well as other meds this morning. Arrives alert and oriented x 4, denies pain, heart rate = 45. Hypertensive.

## 2018-09-30 NOTE — PHARMACY-ADMISSION MEDICATION HISTORY
Admission medication history interview status for this patient is complete. See [facility] admission navigator for allergy information, prior to admission medications and immunization status.     Medication history interview source(s):Patient  Medication history resources (including written lists, pill bottles, clinic record):[facility]  Primary pharmacy:[facility]    Changes made to PTA medication list:  Added: Tums, added frequencies for all meds  Deleted: None  Changed: None    Actions taken by pharmacist (provider contacted, etc):Contacted pharmacy, [phone], since the patient did not know what the med names were or strengths. Patient knew he was taking 1 blood pressure med QD, one seizure med TID, once cholesterol med every day, and tums as needed    Additional medication history information:Patient denied using any vitamins, minerals, supplements, herbals, patches, inhalers, etc. Patient did have his meds this AM.    Medication reconciliation/reorder completed by provider prior to medication history? No    Do you take OTC medications (eg tylenol, ibuprofen, fish oil, eye/ear drops, etc)? Y(Y/N)    For patients on insulin therapy: N (Y/N)        Prior to Admission medications    Medication Sig Last Dose Taking? Auth Provider   atorvastatin (LIPITOR) 40 MG tablet Take 40 mg by mouth every evening  9/29/2018 at pm Yes Reported, Patient   calcium carbonate (TUMS) 500 MG chewable tablet Take 1 chew tab by mouth daily as needed for heartburn 9/29/2018 at na Yes Unknown, Entered By History   lisinopril (PRINIVIL/ZESTRIL) 10 MG tablet Take 10 mg by mouth every morning  9/30/2018 at am Yes Reported, Patient   phenytoin (DILANTIN) 100 MG CR capsule Take 100 mg by mouth 3 times daily  9/30/2018 at am Yes Reported, Patient

## 2018-09-30 NOTE — ED NOTES
Bed: ED03  Expected date: 9/30/18  Expected time: 9:40 AM  Means of arrival: Ambulance  Comments:  JORGE 2

## 2018-09-30 NOTE — ED NOTES
[facility]  ED Nurse Handoff Report    Kofi Haddad is a 85 year old male   ED Chief complaint: Loss of Consciousness  . ED Diagnosis:   Final diagnoses:   Other secondary hypertension   Syncope, unspecified syncope type     Allergies: No Known Allergies    Code Status: Full Code  Activity level - Baseline/Home:  Independent. Activity Level - Current:   Independent. Lift room needed: No. Bariatric: No   Needed: No   Isolation: No. Infection: Not Applicable.     Vital Signs:   Vitals:    09/30/18 1145 09/30/18 1200 09/30/18 1230 09/30/18 1300   BP: (!) 206/112 (!) 205/109 169/84 (!) 188/97   Pulse:       Resp: 13 13 22 10   Temp:       TempSrc:       SpO2:   97% 97%       Cardiac Rhythm:  ,   Cardiac  Cardiac Rhythm: Sinus bradycardia  Pain level: 0-10 Pain Scale: 0  Patient confused: No. Patient Falls Risk: Yes.   Elimination Status: has not voided in ED  Patient Report - Initial Complaint: Patient with episode of syncope while at [employer]. He denies hitting his head, denies other injuries. Heart rate found to be 40's, NSB. He has a history of seizures, states he took his seizure medication as well as other meds this morning. Arrives alert and oriented x 4, denies pain, heart rate = 45. Hypertensive. . Focused Assessment:   10:00 Cardiac Review of Systems (Cardiac) - Cardiac Signs/Symptoms: syncope  Chest Pain Assessment - Rating (0-10): 0  Cardiac Monitoring - EKG Monitoring: Yes Cardiac Regularity: Regular Cardiac Rhythm: SB BLAKE        Tests Performed: CT, labs, chest xray, EKG. Abnormal Results:   Labs Ordered and Resulted from Time of ED Arrival Up to the Time of Departure from the ED   CBC WITH PLATELETS DIFFERENTIAL - Abnormal; Notable for the following:        Result Value    RBC Count 4.25 (*)     All other components within normal limits   BASIC METABOLIC PANEL - Abnormal; Notable for the following:     Anion Gap 2 (*)     Glucose 117 (*)     GFR Estimate 56 (*)     All other  components within normal limits   TROPONIN I     Head CT w/o contrast   Final Result   IMPRESSION: Diffuse cerebral volume loss and cerebral white matter   changes consistent with chronic small vessel ischemic disease. No   evidence for acute intracranial pathology.             AYSHA MENJIVAR MD      XR Chest 2 Views   Final Result   IMPRESSION: Clear lungs.       AYSHA MENJIVAR MD         Treatments provided: cardiac monitor, bp monitoring, bp meds  Family Comments: he has gotten a hold of his daughter Genie and she is taking care of his dog. Is aware of his admit to hospital.  and his wife in to visit in ED  OBS brochure/video discussed/provided to patient:  No  ED Medications:   Medications   cloNIDine (CATAPRES-TTS) Patch in Place (not administered)   cloNIDine (CATAPRES-TTS) patch REMOVAL (not administered)   cloNIDine (CATAPRES-TTS3) 0.3 MG/24HR WK patch 1 patch (1 patch Transdermal Given 9/30/18 1151)   cloNIDine (CATAPRES) tablet 0.1 mg (0.1 mg Oral Given 9/30/18 1050)     Drips infusing:  No  For the majority of the shift, the patient's behavior Green. Interventions performed were clonodine tablet and patch.     Severe Sepsis OR Septic Shock Diagnosis Present: No      ED Nurse Name/Phone Number: Jeannie GRIS Vernon,   1:07 PM    RECEIVING UNIT ED HANDOFF REVIEW    Above ED Nurse Handoff Report was reviewed: Yes  Reviewed by: Majo Messina on September 30, 2018 at 1:19 PM

## 2018-09-30 NOTE — ED PROVIDER NOTES
History     Chief Complaint:  Loss of Consiousness    HPI   Kofi Haddad is a 85 year old male, with history of prostate and skin cancer, hyperlipidemia, hypertension, seizure disorder, bradycardia, severe aortic stenosis and CKD, who presents via EMS to the emergency department for evaluation of a syncopal episode at [facility] prior to arrival. EMS was called and patient had a spontaneous return to baseline with sinus bradycardia to mid 40's per EMS. Patient denies hitting his head, experiencing chest pain, shortness of breath, vomiting, diarrhea, or any other injuries or pain. Patient noted he felt baseline last night and took his normal regiment of medications last night and this morning, including his Dilantin, as scheduled.     Allergies:  No Known Drug Allergies     Medications:    Lipitor  Lisinopril  Dilantin  Tums    Past Medical History:    Prostate cancer  Hyperlipidemia  Hypertension  Seizure disorder  Bradycardia  CKD  Severe aortic stenosis  Basal cell skin cancer    Past Surgical History:    Orchiectomy  Genitourinary surgery    Family History:    The patient denies any relevant family medical history.     Social History:  The patient was accompanied to the ED by EMS.  Smoking Status: Former  Smokeless Tobacco: No  Alcohol Use: N/A   Marital Status:   [2]     Review of Systems   Respiratory: Negative for shortness of breath.    Cardiovascular: Negative for chest pain.   Gastrointestinal: Negative for diarrhea and vomiting.   Neurological: Positive for syncope.   All other systems reviewed and are negative.    Physical Exam   Vitals:  Patient Vitals for the past 24 hrs:   BP Temp Temp src Pulse Heart Rate Resp SpO2   09/30/18 1300 (!) 188/97 - - - (!) 49 10 97 %   09/30/18 1245 (!) 217/96 - - - 52 11 98 %   09/30/18 1230 169/84 - - - 50 22 97 %   09/30/18 1200 (!) 205/109 - - - (!) 46 13 -   09/30/18 1145 (!) 206/112 - - - 53 13 -   09/30/18 1130 (!) 218/107 - - - (!) 48 11 100 %   09/30/18  1115 (!) 234/87 - - - (!) 46 17 95 %   09/30/18 1100 (!) 221/89 - - - (!) 42 19 99 %   09/30/18 1045 (!) 221/86 - - - (!) 46 20 97 %   09/30/18 1030 (!) 202/86 - - - - - -   09/30/18 1015 181/89 - - - (!) 43 19 95 %   09/30/18 1000 195/79 - - - (!) 45 19 97 %   09/30/18 0949 (!) 223/77 97.8  F (36.6  C) Oral (!) 45 (!) 45 16 98 %      Physical Exam  Constitutional: Patient is well appearing. No distress.  Head: Atraumatic.  Mouth/Throat: Oropharynx is clear and moist. No oropharyngeal exudate.  Eyes: Conjunctivae and EOM are normal. No scleral icterus.  Neck: Normal range of motion. Neck supple.   Cardiovascular: Normal rate, regular rhythm, normal heart sounds and intact distal pulses.   Pulmonary/Chest: Breath sounds normal. No respiratory distress.  Abdominal: Soft. Bowel sounds are normal. No distension. No tenderness. No rebound or guarding.   Musculoskeletal: Normal range of motion. No edema or tenderness.   Neurological: Alert and orientated to person, place, and time. No observable focal neuro deficit  Skin: Warm and dry. No rash noted. Not diaphoretic.    Emergency Department Course     ECG:  ECG taken at 0951, ECG read at 0955 by Dr. Cantu  Sinus bradycardia  Right bundle branch block  Abnormal ECG  Rate 47 bpm. LA interval 156. QRS duration 128. QT/QTc 480/424. P-R-T axes 24 -29 0.     Imaging:  Radiology findings were communicated with the patient who voiced understanding of the findings.  XR Chest:  IMPRESSION: Clear lungs.   Reading per radiology.     Head CT w/o Contrast:  IMPRESSION: Diffuse cerebral volume loss and cerebral white matter  changes consistent with chronic small vessel ischemic disease. No  evidence for acute intracranial pathology.   Reading per radiology.     Laboratory:  Laboratory findings were communicated with the patient who voiced understanding of the findings.  CBC: AWNL (WBC 5.1, HGB 13.7, )  BMP: Glucose 117 (H), GFR Estimate 56 (L), Anion Gap 2 (L) o/w WNL  (Creatinine 1.23)  Troponin (Collected 1014): <0.015     Interventions:  1050 Clonidine 0.1 mg PO  1151 Clonidine 0.3mg/24 hr wk patch 1 patch Transdermal     Emergency Department Course:  Nursing notes and vitals reviewed.  EKG obtained in the ED, see results above.    IV was inserted and blood was drawn for laboratory testing, results above.   The patient was sent for a XR Chest, Head CT w/o Contrast while in the emergency department, results above.      9:52 AM: I performed an exam of the patient as documented above. History obtained from patient.  10:32 AM: Rechecked patient.   11:21 AM: Updated patient regarding results. Discussed plan of care and patient is agreeable to admission.   12:38 PM: I spoke with Dr. Carey of the Hospitalist service regarding patient's presentation, findings, and plan of care. Patient will be admitted for further care.  1:18 PM: Notified by nurse that when she went to prep patient to be brought upstairs, patient changed his mind and would like to go home. I spoke with patient about the risks of going home as he lives alone.     I discussed the treatment plan with the patient. They expressed understanding of this plan and consented to admission. I discussed the patient with Dr. Carey, who will admit the patient to a monitored bed for further evaluation and treatment.     I personally reviewed the laboratory results with the Patient and answered all related questions prior to admission.    Impression & Plan      Medical Decision Making:  HTN urgency without end organ dysfxn at this time but syncope.  Lives alone family present all in agreement needs admit for further workup BP mgmt for likely reflex htn melvin and syncope.  Hospitalist agrees.    Diagnosis:    ICD-10-CM    1. Other secondary hypertension I15.8    2. Syncope, unspecified syncope type R55         Disposition:   Admission.    Scribe Disclosure:  Bia RADFORD, am serving as a scribe at 9:48 AM on 9/30/2018 to  document services personally performed by Lazaro Cantu MD, based on my observations and the provider's statements to me.  9/30/2018   [facility] EMERGENCY DEPARTMENT       Lazaro Cantu MD  09/30/18 7027

## 2018-10-01 ENCOUNTER — APPOINTMENT (OUTPATIENT)
Dept: CARDIOLOGY | Facility: CLINIC | Age: 83
DRG: 307 | End: 2018-10-01
Attending: HOSPITALIST
Payer: MEDICARE

## 2018-10-01 VITALS
DIASTOLIC BLOOD PRESSURE: 59 MMHG | OXYGEN SATURATION: 94 % | RESPIRATION RATE: 16 BRPM | HEART RATE: 42 BPM | SYSTOLIC BLOOD PRESSURE: 189 MMHG | TEMPERATURE: 97.1 F

## 2018-10-01 LAB
ALBUMIN SERPL-MCNC: 3.3 G/DL (ref 3.4–5)
ALP SERPL-CCNC: 107 U/L (ref 40–150)
ALT SERPL W P-5'-P-CCNC: 16 U/L (ref 0–70)
ANION GAP SERPL CALCULATED.3IONS-SCNC: 4 MMOL/L (ref 3–14)
AST SERPL W P-5'-P-CCNC: 23 U/L (ref 0–45)
BILIRUB SERPL-MCNC: 0.3 MG/DL (ref 0.2–1.3)
BUN SERPL-MCNC: 24 MG/DL (ref 7–30)
CALCIUM SERPL-MCNC: 8.3 MG/DL (ref 8.5–10.1)
CHLORIDE SERPL-SCNC: 107 MMOL/L (ref 94–109)
CHOLEST SERPL-MCNC: 149 MG/DL
CO2 SERPL-SCNC: 29 MMOL/L (ref 20–32)
CREAT SERPL-MCNC: 1.1 MG/DL (ref 0.66–1.25)
ERYTHROCYTE [DISTWIDTH] IN BLOOD BY AUTOMATED COUNT: 14 % (ref 10–15)
GFR SERPL CREATININE-BSD FRML MDRD: 64 ML/MIN/1.7M2
GLUCOSE SERPL-MCNC: 88 MG/DL (ref 70–99)
HCT VFR BLD AUTO: 38.2 % (ref 40–53)
HDLC SERPL-MCNC: 44 MG/DL
HGB BLD-MCNC: 12.7 G/DL (ref 13.3–17.7)
INTERPRETATION ECG - MUSE: NORMAL
LDLC SERPL CALC-MCNC: 72 MG/DL
MCH RBC QN AUTO: 32.1 PG (ref 26.5–33)
MCHC RBC AUTO-ENTMCNC: 33.2 G/DL (ref 31.5–36.5)
MCV RBC AUTO: 97 FL (ref 78–100)
NONHDLC SERPL-MCNC: 105 MG/DL
PLATELET # BLD AUTO: 134 10E9/L (ref 150–450)
POTASSIUM SERPL-SCNC: 4.4 MMOL/L (ref 3.4–5.3)
PROT SERPL-MCNC: 6.8 G/DL (ref 6.8–8.8)
RBC # BLD AUTO: 3.96 10E12/L (ref 4.4–5.9)
SODIUM SERPL-SCNC: 140 MMOL/L (ref 133–144)
TRIGL SERPL-MCNC: 165 MG/DL
TROPONIN I SERPL-MCNC: 0.02 UG/L (ref 0–0.04)
TROPONIN I SERPL-MCNC: 0.02 UG/L (ref 0–0.04)
WBC # BLD AUTO: 5.8 10E9/L (ref 4–11)

## 2018-10-01 PROCEDURE — 99232 SBSQ HOSP IP/OBS MODERATE 35: CPT | Performed by: INTERNAL MEDICINE

## 2018-10-01 PROCEDURE — 80061 LIPID PANEL: CPT | Performed by: NURSE PRACTITIONER

## 2018-10-01 PROCEDURE — 84484 ASSAY OF TROPONIN QUANT: CPT | Performed by: HOSPITALIST

## 2018-10-01 PROCEDURE — 85027 COMPLETE CBC AUTOMATED: CPT | Performed by: HOSPITALIST

## 2018-10-01 PROCEDURE — 93306 TTE W/DOPPLER COMPLETE: CPT | Mod: 26 | Performed by: INTERNAL MEDICINE

## 2018-10-01 PROCEDURE — 80061 LIPID PANEL: CPT | Performed by: HOSPITALIST

## 2018-10-01 PROCEDURE — A9270 NON-COVERED ITEM OR SERVICE: HCPCS | Mod: GY | Performed by: HOSPITALIST

## 2018-10-01 PROCEDURE — 36415 COLL VENOUS BLD VENIPUNCTURE: CPT | Performed by: HOSPITALIST

## 2018-10-01 PROCEDURE — 80053 COMPREHEN METABOLIC PANEL: CPT | Performed by: HOSPITALIST

## 2018-10-01 PROCEDURE — 93306 TTE W/DOPPLER COMPLETE: CPT

## 2018-10-01 PROCEDURE — 25000132 ZZH RX MED GY IP 250 OP 250 PS 637: Mod: GY | Performed by: HOSPITALIST

## 2018-10-01 RX ORDER — LISINOPRIL 20 MG/1
20 TABLET ORAL EVERY MORNING
Status: DISCONTINUED | OUTPATIENT
Start: 2018-10-01 | End: 2018-10-01 | Stop reason: HOSPADM

## 2018-10-01 RX ADMIN — PHENYTOIN SODIUM 100 MG: 100 CAPSULE ORAL at 08:54

## 2018-10-01 ASSESSMENT — ACTIVITIES OF DAILY LIVING (ADL)
ADLS_ACUITY_SCORE: 9
ADLS_ACUITY_SCORE: 8
ADLS_ACUITY_SCORE: 8

## 2018-10-01 NOTE — DISCHARGE SUMMARY
[facility]    Discharge Summary  Hospitalist    Date of Admission:  9/30/2018  Date of Discharge:  10/1/2018 11:49 AM  Discharging Provider: John Orona MD  Date of Service (when I saw the patient): 10/01/18    Discharge Diagnoses   Syncope  Severe aortic stenosis  Bradycardia  Hypertension  Stage 3A renal function    History of Present Illness   Kofi Haddad is an 85 year old male who presented with Syncope while at [employer].  He did not want to come into the hospital and was found to be bradycardic with a markedly elevated blood pressure  He was seen in consultation by Dr Herrmann of Cardiology and the patient confirmed he did not want to treat his aortic stenosis or bradycardia and left the next day against medical advice before I could see him    Hospital Course   Kofi Haddad was admitted on 9/30/2018.  The following problems were addressed during his hospitalization:    Active Problems:    Syncope and collapse      # Discharge Pain Plan:       John Orona MD    Significant Results and Procedures   The cardiology consult and labs    Pending Results   These results will be followed up by Dr. At [facility], [place]  Unresulted Labs Ordered in the Past 30 Days of this Admission     No orders found for last 61 day(s).          Code Status   DNR       Primary Care Physician   [place] [facility]    Physical Exam   Temp: 97.1  F (36.2  C) Temp src: Oral BP: 189/59 Pulse: (!) 42 Heart Rate: (!) 42 Resp: 16 SpO2: 94 % O2 Device: None (Room air)    There were no vitals filed for this visit.  Vital Signs with Ranges  Temp:  [96.4  F (35.8  C)-98.7  F (37.1  C)] 97.1  F (36.2  C)  Pulse:  [42] 42  Heart Rate:  [42-59] 42  Resp:  [12-18] 16  BP: (150-220)/(54-75) 189/59  SpO2:  [94 %-98 %] 94 %       Constitutional: no exam done as he left before I could see him  Eyes:   ENT:   Respiratory:   Cardiovascular:   GI:   Lymph/Hematologic:   Genitourinary:   Skin:   Musculoskeletal:    Neurologic:   Neuropsychiatric:     Discharge Disposition   Discharged to home  Condition at discharge: Stable    Consultations This Hospital Stay   CARDIOLOGY IP CONSULT  PHYSICAL THERAPY ADULT IP CONSULT  OCCUPATIONAL THERAPY ADULT IP CONSULT    Time Spent on this Encounter   I, John Orona, discharged this patient today but I did not personally see the patient today and will not be billing for the patient's discharge.    Discharge Orders   No discharge procedures on file.  Discharge Medications   Discharge Medication List as of 10/1/2018 11:49 AM      CONTINUE these medications which have NOT CHANGED    Details   atorvastatin (LIPITOR) 40 MG tablet Take 40 mg by mouth every evening , Historical      calcium carbonate (TUMS) 500 MG chewable tablet Take 1 chew tab by mouth daily as needed for heartburn, Historical      lisinopril (PRINIVIL/ZESTRIL) 10 MG tablet Take 10 mg by mouth every morning , Historical      phenytoin (DILANTIN) 100 MG CR capsule Take 100 mg by mouth 3 times daily , Historical           Allergies   No Known Allergies  Data   Most Recent 3 CBC's:  Recent Labs   Lab Test  10/01/18   0614  09/30/18   1014  07/08/17   1935   WBC  5.8  5.1  6.3   HGB  12.7*  13.7  11.7*   MCV  97  96  96   PLT  134*  155  136*      Most Recent 3 BMP's:  Recent Labs   Lab Test  10/01/18   0614  09/30/18   1014  07/08/17   1935   NA  140  140  141   POTASSIUM  4.4  4.4  4.0   CHLORIDE  107  106  109   CO2  29  32  26   BUN  24  21  23   CR  1.10  1.23  1.33*   ANIONGAP  4  2*  6   BRUCE  8.3*  8.6  8.3*   GLC  88  117*  151*     Most Recent 2 LFT's:  Recent Labs   Lab Test  10/01/18   0614   AST  23   ALT  16   ALKPHOS  107   BILITOTAL  0.3     Most Recent INR's and Anticoagulation Dosing History:  Anticoagulation Dose History     Recent Dosing and Labs Latest Ref Rng & Units 4/3/2006    INR 0.86 - 1.14 1.13        Most Recent 3 Troponin's:  Recent Labs   Lab Test  10/01/18   0614  10/01/18   0009  09/30/18    1753   TROPI  0.021  0.020  <0.015     Most Recent Cholesterol Panel:  Recent Labs   Lab Test  10/01/18   0614   CHOL  149   LDL  72   HDL  44   TRIG  165*     Most Recent 6 Bacteria Isolates From Any Culture (See EPIC Reports for Culture Details):No lab results found.  Most Recent TSH, T4 and A1c Labs:No lab results found.  Results for orders placed or performed during the hospital encounter of 09/30/18   XR Chest 2 Views    Narrative    CHEST TWO VIEWS September 30, 2018 10:32 AM     HISTORY: Syncope.    COMPARISON: Frontal chest x-ray 4/5/2006.     FINDINGS: The cardiac silhouette, pulmonary vasculature, lungs and  pleural spaces are within normal limits.      Impression    IMPRESSION: Clear lungs.     AYSHA MENJIVAR MD   Head CT w/o contrast    Narrative    CT OF THE HEAD WITHOUT CONTRAST September 30, 2018 12:21 PM     HISTORY: Hypertension.    TECHNIQUE: 5 mm thick axial CT images of the head were acquired  without IV contrast material. Radiation dose for this scan was reduced  using automated exposure control, adjustment of the mA and/or kV  according to patient size, or iterative reconstruction technique.    COMPARISON: Head CT 4/3/2006.    FINDINGS: There is moderate diffuse cerebral volume loss. There are  subtle patchy areas of decreased density in the cerebral white matter  bilaterally that are consistent with sequela of chronic small vessel  ischemic disease.    The ventricles and basal cisterns are within normal limits in  configuration given the degree of cerebral volume loss. There is no  midline shift. There are no extra-axial fluid collections.    No intracranial hemorrhage, mass or recent infarct.    The visualized paranasal sinuses are well-aerated. There is no  mastoiditis. There are no fractures of the visualized bones.      Impression    IMPRESSION: Diffuse cerebral volume loss and cerebral white matter  changes consistent with chronic small vessel ischemic disease. No  evidence for acute  intracranial pathology.         AYSHA MENJIVAR MD

## 2018-10-01 NOTE — PLAN OF CARE
Problem: Syncope (Adult)  Goal: Identify Related Risk Factors and Signs and Symptoms  Related risk factors and signs and symptoms are identified upon initiation of Human Response Clinical Practice Guideline (CPG).   Outcome: No Change  Pt A/O x4, up A1, VSS except elevated BPs, no PRN meds given, Tele SB w/BBB and PQT - HR 40's, IV SL, LS clear, loud heart murmur heard, pt slept most of the shift, plan TBD, continue with current POC.

## 2018-10-01 NOTE — PROGRESS NOTES
"Pt was AOx4. Assist of 1. Pt was agitated and demanding to leave against medical advice. Son in law in disagreement of patients decision, but supporting choice and helped set up transport home. MD notified, tried to calm patient and explained to family and patient the importance of staying in the hospital to complete treatment. Pt declined and stated \"I am leaving and no one can stop me\". Pt signed AMA form and discharged at 1100.  "

## 2018-10-01 NOTE — PROGRESS NOTES
Cardiology Progress Note  HENOK Nieto          Assessment and Plan:   Admit (9/30) w/ near-syncope/syncopal episode at [facility].  Evidence of bradycardia and significant HTN.  Known severe aortic stenosis    PMH:  Severe aortic stenosis, HTN, previous syncope, prostate ca, hyperlipidemia, seizure disorder    Near Syncope/Syncope  -etiology unclear.  Possibly r/t significant bradycardia, severe aortic stenosis  -HRs 36-58 bpm.  No advanced HB noted on tele  -recommend stopping Clonidine  (was started here in  Hospital)  -ECHO pending  -r/o for MI    Severe Aortic Stenosis  -mean AV gradient 43mmg/ DEYANIRA 1.3cm w/ preserved LVEF & normal aorta  Size per ECHO (10/17) thru [facility]  -ECHO pending today  -consider TAVR  (previously refused)    HTN Urgency  -BP significantly elevated on admit (223/77 mmHg)  -still elevated but improved w/ prn hydralazine  -will increase Lisinopril    Treated Hyperlipidemia:  -will check lipid panel    Memory deficits               Interval History:   No Cp/SOB/palpitations/orthopnea                Medications:       atorvastatin  40 mg Oral QPM     cloNIDine   Transdermal Q8H     [START ON 10/7/2018] cloNIDine   Transdermal Weekly     cloNIDine  1 patch Transdermal Weekly     lisinopril  10 mg Oral QAM     phenytoin  100 mg Oral TID            Physical Exam:   Blood pressure 181/75, pulse (!) 42, temperature 96.4  F (35.8  C), temperature source Oral, resp. rate 16, SpO2 98 %.  Wt Readings from Last 3 Encounters:   07/08/17 75.8 kg (167 lb)          CONST:  Alert and oriented NAD  LUNGS:  CTA bilat  CARDIO:  RRR, S1, S2  III/VI systolic murmur heard best at base  ABD:  Soft  +BS  EXT:  No edema  2+DP bilat           Data:   TELE:  SB    CBC    Recent Labs  Lab 10/01/18  0614 09/30/18  1014   WBC 5.8 5.1   HGB 12.7* 13.7   * 155       BMP    Recent Labs  Lab 10/01/18  0614 09/30/18  1014    140   POTASSIUM 4.4 4.4   CHLORIDE 107 106   BRUCE 8.3* 8.6   CO2 29 32   BUN 24 21   CR  1.10 1.23   GLC 88 117*     No results for input(s): CHOL, HDL, LDL, TRIG, CHOLHDLRATIO in the last 33080 hours.    TROP  Lab Results   Component Value Date    TROPI 0.021 10/01/2018    TROPI 0.020 10/01/2018    TROPI <0.015 09/30/2018    TROPI <0.015 09/30/2018    TROPI  07/08/2017     <0.015  The 99th percentile for upper reference range is 0.045 ug/L.  Troponin values in   the range of 0.045 - 0.120 ug/L may be associated with risks of adverse   clinical events.      TROPONIN <0.07 04/04/2006    TROPONIN <0.07 04/04/2006    TROPONIN 0.08 04/04/2006       BNP  No results for input(s): NTBNPI, NTBNP in the last 168 hours.

## 2018-10-01 NOTE — PROGRESS NOTES
PT: Orders received, chart reviewed. Pt admitted with syncope. Since admission has had low HRs. Currently has been in the 40's all AM. Will hold progressive PT at this time until pt is stabilized medically.

## 2018-10-01 NOTE — PLAN OF CARE
Problem: Syncope (Adult)  Goal: Identify Related Risk Factors and Signs and Symptoms  Related risk factors and signs and symptoms are identified upon initiation of Human Response Clinical Practice Guideline (CPG).  Outcome: No Change  Alert, confused/forgetful.  Tele SB BBB.  BA activated, HFR.   BP elevated 192/62  202/68. Hydralazine 10mg given per orders.  LS clear, diminished.

## 2018-10-24 ENCOUNTER — OFFICE VISIT (OUTPATIENT)
Dept: CARDIOLOGY | Facility: CLINIC | Age: 83
End: 2018-10-24
Payer: COMMERCIAL

## 2018-10-24 VITALS
SYSTOLIC BLOOD PRESSURE: 165 MMHG | BODY MASS INDEX: 25.59 KG/M2 | WEIGHT: 153.6 LBS | HEART RATE: 41 BPM | DIASTOLIC BLOOD PRESSURE: 80 MMHG | OXYGEN SATURATION: 99 % | HEIGHT: 65 IN

## 2018-10-24 DIAGNOSIS — R55 SYNCOPE AND COLLAPSE: Primary | ICD-10-CM

## 2018-10-24 PROCEDURE — 99214 OFFICE O/P EST MOD 30 MIN: CPT | Performed by: INTERNAL MEDICINE

## 2018-10-24 NOTE — LETTER
10/24/2018      [place] [facility]  [address] [place] [address]      RE: Kofi PALMER Fredlarrynatanael       Dear Colleague,    I had the pleasure of seeing Kofi Haddad in the [facility].    Service Date: 10/24/2018      HISTORY OF PRESENT ILLNESS:  Mr. Haddad is a very pleasant 85-year-old gentleman who presents to clinic today for followup after discharge from [facility] on 10/01 for a syncopal event.  He was diagnosed with severe aortic stenosis and bradycardia.  He was referred for surgical intervention offered either open heart surgery versus TAVR and also possibly need for permanent pacemaker implant due to his bradycardia.  The patient refused and left AMA from the hospital.        He did decide to follow up here in Cardiology Clinic today.  He is present with his daughter today and he explains to me that he was standing in [employer] when he started to feel premonition of feeling faint.  He did want to sit down as everybody else was standing and he ultimately fainted or passed out, lost consciousness.  This has not happened to him before.  He lives alone.  He lost his wife about 7 years ago.  He continues to do all of his daily activities including driving, grocery shopping, etc.        He does admit to some short-term memory impairment, but for the most part he is pretty stoic about any symptoms or difficulties with his independence.  He denies any shortness of breath, dyspnea on exertion, chest pains.  He has had a seizure disorder for over 25 years and takes Dilantin which works well for him.  He has not had any seizure activity in decades.        He had been diagnosed with severe aortic stenosis it looks like from [facility] and had been referred for TAVR even last year, but again refused.  He was noted to be hypertensive upon discharge from the hospital.  He was placed on lisinopril.  He did bring his pill bottles today and was a  little bit confused about his medications.  He is, on record, taking 10 mg of lisinopril.  He confused this for a moment with his Dilantin, which he takes 3 times daily.  He does have a pill box that he uses at home.  Neither him or his daughter admit that there is any type of noncompliance with his medications.        He does come in a little hypertensive today 165/80.  His pulse was 41.  He did have an electrocardiogram on 09/30, which I have reviewed, showing sinus bradycardia and some T-wave abnormalities inferiorly.      PHYSICAL EXAMINATION:     NECK:  Carotid upstrokes are diminished.   CARDIOVASCULAR:  Tones are regular and slow.  He has a crescendo systolic ejection murmur throughout the precordium, best at the apex.  I did not appreciate a gallop or rub.   LUNGS:  Clear posteriorly without wheezes or rales.   EXTREMITIES:  He has no peripheral edema on exam.      IMPRESSION:   In summary, Mr. Haddad is a pleasant 85-year-old male with symptomatic severe aortic stenosis and sinus bradycardia.  He has now had a syncopal event, indicating symptoms with either his aortic valve or bradycardia or some combination of both.  Most of this appointment was spent in education and counseling about his heart disease and options for management.  Syncopal event related to aortic stenosis left untreated have a high mortality rate in year and I did discuss this with him and his daughter today.        We talked about options which only include structural management or surgical management, either with open heart surgery versus transcatheter aortic valve replacement, which may be a good option for him.  He is still very reluctant to undergoing any kind of aggressive procedures.  We talked about TAVR being much less invasive, a shorter stay in the hospital, but a lot of preparatory work prior to the procedure itself.  We also discussed some of the potential complications of that as well.        It is hard for him to feel the  need for any kind of intervention when he feels well.  He feels this episode at [facility] was an isolated event.  I also therefore discussed with him that should he become more symptomatic, such as heart failure, the procedural risk, even with TAVR, increases his complication risks.        I did put a print off a pamphlet of TAVR for him to take home to discuss with his family and he will contact me if he is interested at all in pursuing any type of procedure for surgical intervention to his aortic valve and/or permanent pacemaker implant.        Please feel free to contact me with any questions you have in regards to his care      Thank you for allowing me to participate in the care of your nice patient.      cc:      John Orona MD (retired)***         TAMANNA CORREIA DO             D: 10/24/2018   T: 10/24/2018   MT: BASILIO      Name:     BABS MEYER   MRN:      [id number]        Account:      [id number]   :      1933           Service Date: 10/24/2018      Document: [id number]         Outpatient Encounter Prescriptions as of 10/24/2018   Medication Sig Dispense Refill     calcium carbonate (TUMS) 500 MG chewable tablet Take 1 chew tab by mouth daily as needed for heartburn       lisinopril (PRINIVIL/ZESTRIL) 10 MG tablet Take 10 mg by mouth every morning        phenytoin (DILANTIN) 100 MG CR capsule Take 100 mg by mouth 3 times daily        RANITIDINE HCL PO Take 150 mg by mouth 2 times daily       atorvastatin (LIPITOR) 40 MG tablet Take 40 mg by mouth every evening        No facility-administered encounter medications on file as of 10/24/2018.        Again, thank you for allowing me to participate in the care of your patient.      Sincerely,    Tamanna Correia DO     [facility]

## 2018-10-24 NOTE — PROGRESS NOTES
Service Date: 10/24/2018      HISTORY OF PRESENT ILLNESS:  Mr. Haddad is a very pleasant 85-year-old gentleman who presents to clinic today for followup after discharge from [facility] on 10/01 for a syncopal event.  He was diagnosed with severe aortic stenosis and bradycardia.  He was referred for surgical intervention offered either open heart surgery versus TAVR and also possibly need for permanent pacemaker implant due to his bradycardia.  The patient refused and left AMA from the hospital.        He did decide to follow up here in Cardiology Clinic today.  He is present with his daughter today and he explains to me that he was standing in [employer] when he started to feel premonition of feeling faint.  He did want to sit down as everybody else was standing and he ultimately fainted or passed out, lost consciousness.  This has not happened to him before.  He lives alone.  He lost his wife about 7 years ago.  He continues to do all of his daily activities including driving, grocery shopping, etc.        He does admit to some short-term memory impairment, but for the most part he is pretty stoic about any symptoms or difficulties with his independence.  He denies any shortness of breath, dyspnea on exertion, chest pains.  He has had a seizure disorder for over 25 years and takes Dilantin which works well for him.  He has not had any seizure activity in decades.        He had been diagnosed with severe aortic stenosis it looks like from [facility] and had been referred for TAVR even last year, but again refused.  He was noted to be hypertensive upon discharge from the hospital.  He was placed on lisinopril.  He did bring his pill bottles today and was a little bit confused about his medications.  He is, on record, taking 10 mg of lisinopril.  He confused this for a moment with his Dilantin, which he takes 3 times daily.  He does have a pill box that he uses at home.  Neither him or his  daughter admit that there is any type of noncompliance with his medications.        He does come in a little hypertensive today 165/80.  His pulse was 41.  He did have an electrocardiogram on 09/30, which I have reviewed, showing sinus bradycardia and some T-wave abnormalities inferiorly.      PHYSICAL EXAMINATION:     NECK:  Carotid upstrokes are diminished.   CARDIOVASCULAR:  Tones are regular and slow.  He has a crescendo systolic ejection murmur throughout the precordium, best at the apex.  I did not appreciate a gallop or rub.   LUNGS:  Clear posteriorly without wheezes or rales.   EXTREMITIES:  He has no peripheral edema on exam.      IMPRESSION:   In summary, Mr. Haddad is a pleasant 85-year-old male with symptomatic severe aortic stenosis and sinus bradycardia.  He has now had a syncopal event, indicating symptoms with either his aortic valve or bradycardia or some combination of both.  Most of this appointment was spent in education and counseling about his heart disease and options for management.  Syncopal event related to aortic stenosis left untreated have a high mortality rate in year and I did discuss this with him and his daughter today.        We talked about options which only include structural management or surgical management, either with open heart surgery versus transcatheter aortic valve replacement, which may be a good option for him.  He is still very reluctant to undergoing any kind of aggressive procedures.  We talked about TAVR being much less invasive, a shorter stay in the hospital, but a lot of preparatory work prior to the procedure itself.  We also discussed some of the potential complications of that as well.        It is hard for him to feel the need for any kind of intervention when he feels well.  He feels this episode at [facility] was an isolated event.  I also therefore discussed with him that should he become more symptomatic, such as heart failure, the procedural risk, even  with TAVR, increases his complication risks.        I did put a print off a pamphlet of TAVR for him to take home to discuss with his family and he will contact me if he is interested at all in pursuing any type of procedure for surgical intervention to his aortic valve and/or permanent pacemaker implant.        Please feel free to contact me with any questions you have in regards to his care      Thank you for allowing me to participate in the care of your nice patient.      cc:      John Orona MD (retired)         CRISTHIAN ALY DO             D: 10/24/2018   T: 10/24/2018   MT: BASILIO      Name:     BABS MEYER   MRN:      [id number]        Account:      [id number]   :      1933           Service Date: 10/24/2018      Document: [id number]

## 2018-10-24 NOTE — LETTER
10/24/2018      [facility]  [address] [place] [address]      RE: Kofi PALMER Fredlarrynatanael       Dear Colleague,    I had the pleasure of seeing Kofi Haddad in the [facility].    Service Date: 10/24/2018      HISTORY OF PRESENT ILLNESS:  Mr. Haddad is a very pleasant 85-year-old gentleman who presents to clinic today for followup after discharge from [facility] on 10/01 for a syncopal event.  He was diagnosed with severe aortic stenosis and bradycardia.  He was referred for surgical intervention offered either open heart surgery versus TAVR and also possibly need for permanent pacemaker implant due to his bradycardia.  The patient refused and left AMA from the hospital.        He did decide to follow up here in Cardiology Clinic today.  He is present with his daughter today and he explains to me that he was standing in [place] when he started to feel premonition of feeling faint.  He did want to sit down as everybody else was standing and he ultimately fainted or passed out, lost consciousness.  This has not happened to him before.  He lives alone.  He lost his wife about 7 years ago.  He continues to do all of his daily activities including driving, grocery shopping, etc.        He does admit to some short-term memory impairment, but for the most part he is pretty stoic about any symptoms or difficulties with his independence.  He denies any shortness of breath, dyspnea on exertion, chest pains.  He has had a seizure disorder for over 25 years and takes Dilantin which works well for him.  He has not had any seizure activity in decades.        He had been diagnosed with severe aortic stenosis it looks like from [facility] and had been referred for TAVR even last year, but again refused.  He was noted to be hypertensive upon discharge from the hospital.  He was placed on lisinopril.  He did bring his pill bottles today and was a  little bit confused about his medications.  He is, on record, taking 10 mg of lisinopril.  He confused this for a moment with his Dilantin, which he takes 3 times daily.  He does have a pill box that he uses at home.  Neither him or his daughter admit that there is any type of noncompliance with his medications.        He does come in a little hypertensive today 165/80.  His pulse was 41.  He did have an electrocardiogram on 09/30, which I have reviewed, showing sinus bradycardia and some T-wave abnormalities inferiorly.      PHYSICAL EXAMINATION:     NECK:  Carotid upstrokes are diminished.   CARDIOVASCULAR:  Tones are regular and slow.  He has a crescendo systolic ejection murmur throughout the precordium, best at the apex.  I did not appreciate a gallop or rub.   LUNGS:  Clear posteriorly without wheezes or rales.   EXTREMITIES:  He has no peripheral edema on exam.      IMPRESSION:   In summary, Mr. Haddad is a pleasant 85-year-old male with symptomatic severe aortic stenosis and sinus bradycardia.  He has now had a syncopal event, indicating symptoms with either his aortic valve or bradycardia or some combination of both.  Most of this appointment was spent in education and counseling about his heart disease and options for management.  Syncopal event related to aortic stenosis left untreated have a high mortality rate in year and I did discuss this with him and his daughter today.        We talked about options which only include structural management or surgical management, either with open heart surgery versus transcatheter aortic valve replacement, which may be a good option for him.  He is still very reluctant to undergoing any kind of aggressive procedures.  We talked about TAVR being much less invasive, a shorter stay in the hospital, but a lot of preparatory work prior to the procedure itself.  We also discussed some of the potential complications of that as well.        It is hard for him to feel the  need for any kind of intervention when he feels well.  He feels this episode at [facility] was an isolated event.  I also therefore discussed with him that should he become more symptomatic, such as heart failure, the procedural risk, even with TAVR, increases his complication risks.        I did put a print off a pamphlet of TAVR for him to take home to discuss with his family and he will contact me if he is interested at all in pursuing any type of procedure for surgical intervention to his aortic valve and/or permanent pacemaker implant.        Please feel free to contact me with any questions you have in regards to his care      Thank you for allowing me to participate in the care of your nice patient.      cc:      John Orona MD (retired)***         TAMANNA CORREIA DO             D: 10/24/2018   T: 10/24/2018   MT: BASILIO      Name:     BABS MEYER   MRN:      [id number]        Account:      [id number]   :      1933           Service Date: 10/24/2018      Document: [id number]         Outpatient Encounter Prescriptions as of 10/24/2018   Medication Sig Dispense Refill     calcium carbonate (TUMS) 500 MG chewable tablet Take 1 chew tab by mouth daily as needed for heartburn       lisinopril (PRINIVIL/ZESTRIL) 10 MG tablet Take 10 mg by mouth every morning        phenytoin (DILANTIN) 100 MG CR capsule Take 100 mg by mouth 3 times daily        RANITIDINE HCL PO Take 150 mg by mouth 2 times daily       atorvastatin (LIPITOR) 40 MG tablet Take 40 mg by mouth every evening        No facility-administered encounter medications on file as of 10/24/2018.        Again, thank you for allowing me to participate in the care of your patient.      Sincerely,    Tamanna Correia DO     [facility]

## 2018-10-24 NOTE — LETTER
10/24/2018      [facility] [place] [facility]  [address] [place] [address]      RE: Kofi PALMER Fredlarrynatanael       Dear Colleague,    I had the pleasure of seeing Kofi Haddad in the [facility].    Service Date: 10/24/2018      HISTORY OF PRESENT ILLNESS:  Mr. Haddad is a very pleasant 85-year-old gentleman who presents to clinic today for followup after discharge from [facility] on 10/01 for a syncopal event.  He was diagnosed with severe aortic stenosis and bradycardia.  He was referred for surgical intervention offered either open heart surgery versus TAVR and also possibly need for permanent pacemaker implant due to his bradycardia.  The patient refused and left AMA from the hospital.        He did decide to follow up here in Cardiology Clinic today.  He is present with his daughter today and he explains to me that he was standing in [employer] when he started to feel premonition of feeling faint.  He did want to sit down as everybody else was standing and he ultimately fainted or passed out, lost consciousness.  This has not happened to him before.  He lives alone.  He lost his wife about 7 years ago.  He continues to do all of his daily activities including driving, grocery shopping, etc.        He does admit to some short-term memory impairment, but for the most part he is pretty stoic about any symptoms or difficulties with his independence.  He denies any shortness of breath, dyspnea on exertion, chest pains.  He has had a seizure disorder for over 25 years and takes Dilantin which works well for him.  He has not had any seizure activity in decades.        He had been diagnosed with severe aortic stenosis it looks like from [facility] and had been referred for TAVR even last year, but again refused.  He was noted to be hypertensive upon discharge from the hospital.  He was placed on lisinopril.  He did bring his pill bottles today and was a  little bit confused about his medications.  He is, on record, taking 10 mg of lisinopril.  He confused this for a moment with his Dilantin, which he takes 3 times daily.  He does have a pill box that he uses at home.  Neither him or his daughter admit that there is any type of noncompliance with his medications.        He does come in a little hypertensive today 165/80.  His pulse was 41.  He did have an electrocardiogram on 09/30, which I have reviewed, showing sinus bradycardia and some T-wave abnormalities inferiorly.      PHYSICAL EXAMINATION:     NECK:  Carotid upstrokes are diminished.   CARDIOVASCULAR:  Tones are regular and slow.  He has a crescendo systolic ejection murmur throughout the precordium, best at the apex.  I did not appreciate a gallop or rub.   LUNGS:  Clear posteriorly without wheezes or rales.   EXTREMITIES:  He has no peripheral edema on exam.      IMPRESSION:   In summary, Mr. Haddad is a pleasant 85-year-old male with symptomatic severe aortic stenosis and sinus bradycardia.  He has now had a syncopal event, indicating symptoms with either his aortic valve or bradycardia or some combination of both.  Most of this appointment was spent in education and counseling about his heart disease and options for management.  Syncopal event related to aortic stenosis left untreated have a high mortality rate in year and I did discuss this with him and his daughter today.        We talked about options which only include structural management or surgical management, either with open heart surgery versus transcatheter aortic valve replacement, which may be a good option for him.  He is still very reluctant to undergoing any kind of aggressive procedures.  We talked about TAVR being much less invasive, a shorter stay in the hospital, but a lot of preparatory work prior to the procedure itself.  We also discussed some of the potential complications of that as well.        It is hard for him to feel the  need for any kind of intervention when he feels well.  He feels this episode at [facility] was an isolated event.  I also therefore discussed with him that should he become more symptomatic, such as heart failure, the procedural risk, even with TAVR, increases his complication risks.        I did put a print off a pamphlet of TAVR for him to take home to discuss with his family and he will contact me if he is interested at all in pursuing any type of procedure for surgical intervention to his aortic valve and/or permanent pacemaker implant.        Please feel free to contact me with any questions you have in regards to his care      Thank you for allowing me to participate in the care of your nice patient.      cc:      John Orona MD (retired)        TAMANNA CORREIA DO             D: 10/24/2018   T: 10/24/2018   MT: BASILIO      Name:     BABS MEYER   MRN:      [id number]        Account:      [id number]   :      1933           Service Date: 10/24/2018      Document: [id number]         Outpatient Encounter Prescriptions as of 10/24/2018   Medication Sig Dispense Refill     calcium carbonate (TUMS) 500 MG chewable tablet Take 1 chew tab by mouth daily as needed for heartburn       lisinopril (PRINIVIL/ZESTRIL) 10 MG tablet Take 10 mg by mouth every morning        phenytoin (DILANTIN) 100 MG CR capsule Take 100 mg by mouth 3 times daily        RANITIDINE HCL PO Take 150 mg by mouth 2 times daily       atorvastatin (LIPITOR) 40 MG tablet Take 40 mg by mouth every evening        No facility-administered encounter medications on file as of 10/24/2018.        Again, thank you for allowing me to participate in the care of your patient.      Sincerely,    Tamanna Correia DO     [facility]

## 2018-10-24 NOTE — MR AVS SNAPSHOT
"              After Visit Summary   10/24/2018    Kofi Haddad    MRN: [id number]           Patient Information     Date Of Birth          1933        Visit Information        Provider Department      10/24/2018 1:15 PM Tamanna Correia,  [facility]   [place]        Today's Diagnoses     Syncope and collapse    -  1       Follow-ups after your visit        Who to contact     If you have questions or need follow up information about today's clinic visit or your schedule please contact [facility]   [place] directly at [phone].  Normal or non-critical lab and imaging results will be communicated to you by MyChart, letter or phone within 4 business days after the clinic has received the results. If you do not hear from us within 7 days, please contact the clinic through [employer] or phone. If you have a critical or abnormal lab result, we will notify you by phone as soon as possible.  Submit refill requests through [employer] or call your pharmacy and they will forward the refill request to us. Please allow 3 business days for your refill to be completed.          Additional Information About Your Visit        MyChart Information     [employer] lets you send messages to your doctor, view your test results, renew your prescriptions, schedule appointments and more. To sign up, go to [email or web address] . Click on \"Log in\" on the left side of the screen, which will take you to the Welcome page. Then click on \"Sign up Now\" on the right side of the page.     You will be asked to enter the access code listed below, as well as some personal information. Please follow the directions to create your username and password.     Your access code is: [email or web address]  Expires: 2019  2:09 PM     Your access code will  in 90 days. If you need help or a new code, please call your [place] clinic or [phone].        Care EveryWhere " "ID     This is your Care EveryWhere ID. This could be used by other organizations to access your [place] medical records  [id number]        Your Vitals Were     Pulse Height Pulse Oximetry BMI (Body Mass Index)          41 1.651 m (5' 5\") 99% 25.56 kg/m2         Blood Pressure from Last 3 Encounters:   10/24/18 165/80   10/01/18 189/59   07/08/17 150/63    Weight from Last 3 Encounters:   10/24/18 69.7 kg (153 lb 9.6 oz)   07/08/17 75.8 kg (167 lb)              Today, you had the following     No orders found for display       Primary Care Provider Office Phone # Fax #    [facility] [phone] [address] [place] [address]        Equal Access to Services     ROSE MIRANDA : Hadii benito leung hadasho Soomaali, waaxda luqadaha, qaybta kaalmada adeegyada, kane patterson . So [facility] [phone].    ATENCIÓN: Si habla español, tiene a guardado disposición servicios gratuitos de asistencia lingüística. [facility] [phone].    We comply with applicable federal civil rights laws and [place] laws. We do not discriminate on the basis of race, color, national origin, age, disability, sex, sexual orientation, or gender identity.            Thank you!     Thank you for choosing [facility]   [facility]  for your care. Our goal is always to provide you with excellent care. Hearing back from our patients is one way we can continue to improve our services. Please take a few minutes to complete the written survey that you may receive in the mail after your visit with us. Thank you!             Your Updated Medication List - Protect others around you: Learn how to safely use, store and throw away your medicines at www.disposemymeds.org.          This list is accurate as of 10/24/18  2:09 PM.  Always use your most recent med list.                   Brand Name Dispense Instructions for use Diagnosis    atorvastatin 40 MG tablet    LIPITOR    "  Take 40 mg by mouth every evening        lisinopril 10 MG tablet    PRINIVIL/ZESTRIL     Take 10 mg by mouth every morning        phenytoin 100 MG CR capsule    DILANTIN     Take 100 mg by mouth 3 times daily        RANITIDINE HCL PO      Take 150 mg by mouth 2 times daily        TUMS 500 MG chewable tablet   Generic drug:  calcium carbonate      Take 1 chew tab by mouth daily as needed for heartburn

## 2018-10-24 NOTE — PROGRESS NOTES
HPI and Plan:   See dictation    No orders of the defined types were placed in this encounter.      Orders Placed This Encounter   Medications     RANITIDINE HCL PO     Sig: Take 150 mg by mouth 2 times daily       There are no discontinued medications.      Encounter Diagnosis   Name Primary?     Syncope and collapse Yes       CURRENT MEDICATIONS:  Current Outpatient Prescriptions   Medication Sig Dispense Refill     calcium carbonate (TUMS) 500 MG chewable tablet Take 1 chew tab by mouth daily as needed for heartburn       lisinopril (PRINIVIL/ZESTRIL) 10 MG tablet Take 10 mg by mouth every morning        phenytoin (DILANTIN) 100 MG CR capsule Take 100 mg by mouth 3 times daily        RANITIDINE HCL PO Take 150 mg by mouth 2 times daily       atorvastatin (LIPITOR) 40 MG tablet Take 40 mg by mouth every evening          ALLERGIES   No Known Allergies    PAST MEDICAL HISTORY:  Past Medical History:   Diagnosis Date     Cancer (H)      High cholesterol      Hypertension      Seizures (H)        PAST SURGICAL HISTORY:  Past Surgical History:   Procedure Laterality Date     GENITOURINARY SURGERY         FAMILY HISTORY:  History reviewed. No pertinent family history.    SOCIAL HISTORY:  Social History     Social History     Marital status:      Spouse name: N/A     Number of children: N/A     Years of education: N/A     Social History Main Topics     Smoking status: Former Smoker     Smokeless tobacco: Never Used     Alcohol use None     Drug use: None     Sexual activity: Not Asked     Other Topics Concern     None     Social History Narrative       Review of Systems:  Skin:  Negative       Eyes:  Negative      ENT:  Negative      Respiratory:  Negative       Cardiovascular:  edema;palpitations;chest pain;dizziness;lightheadedness;fatigue;exercise intolerance Positive for;syncope or near-syncope    Gastroenterology: Positive for reflux    Genitourinary:  Positive for urgency    Musculoskeletal:  Negative     "  Neurologic:  Negative      Psychiatric:         Heme/Lymph/Imm:  Negative      Endocrine:  Negative        Physical Exam:  Vitals: /80 (BP Location: Right arm, Patient Position: Sitting, Cuff Size: Adult Large)  Pulse (!) 41  Ht 1.651 m (5' 5\")  Wt 69.7 kg (153 lb 9.6 oz)  SpO2 99%  BMI 25.56 kg/m2    Constitutional:  cooperative;in no acute distress        Skin:  warm and dry to the touch          Head:  normocephalic        Eyes:  pupils equal and round        Lymph:      ENT:  no pallor or cyanosis        Neck:    delayed carotid upstroke      Respiratory:  clear to auscultation;normal symmetry         Cardiac: regular rhythm       systolic ejection murmur;grade 2          pulses below the femoral arteries are diminished                                      GI:  abdomen soft;no bruits        Extremities and Muscular Skeletal:  no edema;no deformities, clubbing, cyanosis, erythema observed              Neurological:  affect appropriate;no gross motor deficits        Psych:  Alert and Oriented x 3          CC  [facility]  [address] [place] [address]                  "

## 2018-10-24 NOTE — LETTER
10/24/2018    [facility]  [address] [place] [address]    RE: Kofi Haddad       Dear Colleague,    I had the pleasure of seeing Kofi Haddad in the [facility].    HPI and Plan:   See dictation    No orders of the defined types were placed in this encounter.      Orders Placed This Encounter   Medications     RANITIDINE HCL PO     Sig: Take 150 mg by mouth 2 times daily       There are no discontinued medications.      Encounter Diagnosis   Name Primary?     Syncope and collapse Yes       CURRENT MEDICATIONS:  Current Outpatient Prescriptions   Medication Sig Dispense Refill     calcium carbonate (TUMS) 500 MG chewable tablet Take 1 chew tab by mouth daily as needed for heartburn       lisinopril (PRINIVIL/ZESTRIL) 10 MG tablet Take 10 mg by mouth every morning        phenytoin (DILANTIN) 100 MG CR capsule Take 100 mg by mouth 3 times daily        RANITIDINE HCL PO Take 150 mg by mouth 2 times daily       atorvastatin (LIPITOR) 40 MG tablet Take 40 mg by mouth every evening          ALLERGIES   No Known Allergies    PAST MEDICAL HISTORY:  Past Medical History:   Diagnosis Date     Cancer (H)      High cholesterol      Hypertension      Seizures (H)        PAST SURGICAL HISTORY:  Past Surgical History:   Procedure Laterality Date     GENITOURINARY SURGERY         FAMILY HISTORY:  History reviewed. No pertinent family history.    SOCIAL HISTORY:  Social History     Social History     Marital status:      Spouse name: N/A     Number of children: N/A     Years of education: N/A     Social History Main Topics     Smoking status: Former Smoker     Smokeless tobacco: Never Used     Alcohol use None     Drug use: None     Sexual activity: Not Asked     Other Topics Concern     None     Social History Narrative       Review of Systems:  Skin:  Negative       Eyes:  Negative      ENT:  Negative      Respiratory:  Negative       Cardiovascular:   "edema;palpitations;chest pain;dizziness;lightheadedness;fatigue;exercise intolerance Positive for;syncope or near-syncope    Gastroenterology: Positive for reflux    Genitourinary:  Positive for urgency    Musculoskeletal:  Negative      Neurologic:  Negative      Psychiatric:         Heme/Lymph/Imm:  Negative      Endocrine:  Negative        Physical Exam:  Vitals: /80 (BP Location: Right arm, Patient Position: Sitting, Cuff Size: Adult Large)  Pulse (!) 41  Ht 1.651 m (5' 5\")  Wt 69.7 kg (153 lb 9.6 oz)  SpO2 99%  BMI 25.56 kg/m2    Constitutional:  cooperative;in no acute distress        Skin:  warm and dry to the touch          Head:  normocephalic        Eyes:  pupils equal and round        Lymph:      ENT:  no pallor or cyanosis        Neck:    delayed carotid upstroke      Respiratory:  clear to auscultation;normal symmetry         Cardiac: regular rhythm       systolic ejection murmur;grade 2          pulses below the femoral arteries are diminished                                      GI:  abdomen soft;no bruits        Extremities and Muscular Skeletal:  no edema;no deformities, clubbing, cyanosis, erythema observed              Neurological:  affect appropriate;no gross motor deficits        Psych:  Alert and Oriented x 3          CC  [facility]  [address] [place] [address]                    Thank you for allowing me to participate in the care of your patient.      Sincerely,     Tamanna Correia,      [facility]    cc:   [facility] [address] [place] [address]        "

## 2019-07-01 ENCOUNTER — APPOINTMENT (OUTPATIENT)
Dept: GENERAL RADIOLOGY | Facility: CLINIC | Age: 84
End: 2019-07-01
Attending: NURSE PRACTITIONER
Payer: MEDICARE

## 2019-07-01 ENCOUNTER — HOSPITAL ENCOUNTER (EMERGENCY)
Facility: CLINIC | Age: 84
Discharge: HOME | End: 2019-07-01
Attending: NURSE PRACTITIONER | Admitting: NURSE PRACTITIONER
Payer: MEDICARE

## 2019-07-01 VITALS
DIASTOLIC BLOOD PRESSURE: 84 MMHG | HEART RATE: 43 BPM | SYSTOLIC BLOOD PRESSURE: 198 MMHG | RESPIRATION RATE: 14 BRPM | OXYGEN SATURATION: 99 % | TEMPERATURE: 98.4 F

## 2019-07-01 DIAGNOSIS — R00.1 BRADYCARDIA: ICD-10-CM

## 2019-07-01 DIAGNOSIS — R55 SYNCOPE: ICD-10-CM

## 2019-07-01 DIAGNOSIS — I35.0 AORTIC STENOSIS: ICD-10-CM

## 2019-07-01 DIAGNOSIS — I16.0 HYPERTENSIVE URGENCY: ICD-10-CM

## 2019-07-01 LAB
ANION GAP SERPL CALCULATED.3IONS-SCNC: 5 MMOL/L (ref 3–14)
BASOPHILS # BLD AUTO: 0 10E9/L (ref 0–0.2)
BASOPHILS NFR BLD AUTO: 0.5 %
BUN SERPL-MCNC: 28 MG/DL (ref 7–30)
CALCIUM SERPL-MCNC: 8.7 MG/DL (ref 8.5–10.1)
CHLORIDE SERPL-SCNC: 107 MMOL/L (ref 94–109)
CO2 SERPL-SCNC: 25 MMOL/L (ref 20–32)
CREAT SERPL-MCNC: 1.22 MG/DL (ref 0.66–1.25)
DIFFERENTIAL METHOD BLD: ABNORMAL
EOSINOPHIL # BLD AUTO: 0.3 10E9/L (ref 0–0.7)
EOSINOPHIL NFR BLD AUTO: 4.7 %
ERYTHROCYTE [DISTWIDTH] IN BLOOD BY AUTOMATED COUNT: 14.2 % (ref 10–15)
GFR SERPL CREATININE-BSD FRML MDRD: 53 ML/MIN/{1.73_M2}
GLUCOSE SERPL-MCNC: 104 MG/DL (ref 70–99)
HCT VFR BLD AUTO: 38 % (ref 40–53)
HGB BLD-MCNC: 12.5 G/DL (ref 13.3–17.7)
IMM GRANULOCYTES # BLD: 0 10E9/L (ref 0–0.4)
IMM GRANULOCYTES NFR BLD: 0.2 %
LYMPHOCYTES # BLD AUTO: 1.7 10E9/L (ref 0.8–5.3)
LYMPHOCYTES NFR BLD AUTO: 28.2 %
MCH RBC QN AUTO: 31.6 PG (ref 26.5–33)
MCHC RBC AUTO-ENTMCNC: 32.9 G/DL (ref 31.5–36.5)
MCV RBC AUTO: 96 FL (ref 78–100)
MONOCYTES # BLD AUTO: 0.5 10E9/L (ref 0–1.3)
MONOCYTES NFR BLD AUTO: 8.2 %
NEUTROPHILS # BLD AUTO: 3.5 10E9/L (ref 1.6–8.3)
NEUTROPHILS NFR BLD AUTO: 58.2 %
NRBC # BLD AUTO: 0 10*3/UL
NRBC BLD AUTO-RTO: 0 /100
PHENYTOIN SERPL-MCNC: 6.3 MG/L (ref 10–20)
PLATELET # BLD AUTO: 85 10E9/L (ref 150–450)
PLATELET # BLD EST: ABNORMAL 10*3/UL
POTASSIUM SERPL-SCNC: 5.5 MMOL/L (ref 3.4–5.3)
RBC # BLD AUTO: 3.96 10E12/L (ref 4.4–5.9)
RBC MORPH BLD: ABNORMAL
SODIUM SERPL-SCNC: 137 MMOL/L (ref 133–144)
TROPONIN I SERPL-MCNC: <0.015 UG/L (ref 0–0.04)
WBC # BLD AUTO: 6 10E9/L (ref 4–11)

## 2019-07-01 PROCEDURE — 93005 ELECTROCARDIOGRAM TRACING: CPT

## 2019-07-01 PROCEDURE — 80048 BASIC METABOLIC PNL TOTAL CA: CPT | Performed by: NURSE PRACTITIONER

## 2019-07-01 PROCEDURE — 25000125 ZZHC RX 250: Performed by: NURSE PRACTITIONER

## 2019-07-01 PROCEDURE — 99285 EMERGENCY DEPT VISIT HI MDM: CPT | Mod: 25

## 2019-07-01 PROCEDURE — 85025 COMPLETE CBC W/AUTO DIFF WBC: CPT | Performed by: NURSE PRACTITIONER

## 2019-07-01 PROCEDURE — 84484 ASSAY OF TROPONIN QUANT: CPT | Performed by: NURSE PRACTITIONER

## 2019-07-01 PROCEDURE — 80185 ASSAY OF PHENYTOIN TOTAL: CPT | Performed by: NURSE PRACTITIONER

## 2019-07-01 PROCEDURE — 71046 X-RAY EXAM CHEST 2 VIEWS: CPT

## 2019-07-01 PROCEDURE — 96374 THER/PROPH/DIAG INJ IV PUSH: CPT

## 2019-07-01 RX ORDER — ENALAPRILAT 1.25 MG/ML
1.25 INJECTION INTRAVENOUS ONCE
Status: COMPLETED | OUTPATIENT
Start: 2019-07-01 | End: 2019-07-01

## 2019-07-01 RX ORDER — ENALAPRILAT 1.25 MG/ML
2.5 INJECTION INTRAVENOUS ONCE
Status: DISCONTINUED | OUTPATIENT
Start: 2019-07-01 | End: 2019-07-01

## 2019-07-01 RX ADMIN — ENALAPRILAT 1.25 MG: 1.25 INJECTION INTRAVENOUS at 14:38

## 2019-07-01 ASSESSMENT — ENCOUNTER SYMPTOMS
PALPITATIONS: 0
LIGHT-HEADEDNESS: 0

## 2019-07-01 NOTE — ED TRIAGE NOTES
Pt is alert. Confused about how he fainted. Per neighbor, Pt fainted and helped get down on the ground. Neighbor denies hitting the head.

## 2019-07-01 NOTE — ED PROVIDER NOTES
History     Chief Complaint:  Syncope      The history is limited by the condition of the patient.      Kofi Haddad is an 86 year old male with a history of bradycardia, hyperlipidemia, hypertension, seizures, syncope, CKD, GERD, aortic stenosis, and skin cancer, who presents to the ED after having a syncopal episode while trying to stand up after doing yard work earlier today.  He states a neighbor was there and was able to lower him to the ground. He denies hitting his head, but reports not remembering all of the details of the incident. He denies any chest pain, shortness of breath, palpitations or dizziness prior to episode.  Neighbor denies any seizure activity. He reports last having a seizure 10 years ago and has been taking dilantin since then. While here, he denies chest pain, palpitations, lightheadedness, or leg swelling.     Allergies:  NDKA     Medications:    Lipitor  Prinvil   Dilantin   Ranitidine   Norvasc    Past Medical History:    Cancer  Hyperlipidemia  HTN  Seizures  Syncope   CKD  GERD  Aortic stenosis  Bradycardia    Past Surgical History:     surgery     Family History:    Arthritis     Social History:  Smoking status: former  Alcohol use: yes  Drug use: no  PCP: [facility]  Marital Status:        Review of Systems   Cardiovascular: Negative for chest pain, palpitations and leg swelling.   Neurological: Positive for syncope (Possible). Negative for light-headedness.   All other systems reviewed and are negative.      Physical Exam     Patient Vitals for the past 24 hrs:   BP Temp Temp src Pulse Heart Rate Resp SpO2   07/01/19 1515 198/84 -- -- (!) 43 (!) 48 19 99 %   07/01/19 1510 193/77 -- -- (!) 45 (!) 46 14 97 %   07/01/19 1505 200/82 -- -- (!) 47 (!) 43 12 100 %   07/01/19 1500 (!) 209/77 -- -- (!) 45 (!) 39 11 97 %   07/01/19 1455 (!) 207/83 -- -- (!) 44 (!) 45 9 97 %   07/01/19 1450 (!) 216/75 -- -- (!) 38 (!) 46 14 99 %   07/01/19 1445 (!) 219/88 -- --  (!) 40 (!) 38 12 100 %   07/01/19 1442 (!) 216/76 -- -- (!) 39 (!) 38 17 96 %   07/01/19 1440 199/75 -- -- (!) 39 (!) 39 19 96 %   07/01/19 1430 (!) 207/83 -- -- (!) 45 (!) 37 15 97 %   07/01/19 1415 (!) 220/71 -- -- (!) 37 (!) 40 19 96 %   07/01/19 1414 -- -- -- -- (!) 42 -- 97 %   07/01/19 1413 -- -- -- -- (!) 40 -- 96 %   07/01/19 1405 (!) 223/83 -- -- (!) 46 (!) 40 12 94 %   07/01/19 1400 (!) 224/76 -- -- (!) 45 (!) 46 10 97 %   07/01/19 1359 (!) 223/84 98.4  F (36.9  C) Oral -- (!) 44 18 97 %       Physical Exam  Constitutional: Alert, attentive, GCS 15.    HENT: Mucous membranes are moist.   Eyes: EOM are normal. Conjunctiva pink, no scleral icterus or conjunctival injection  CV: harsh systolic ejection murmur. Bradycardic, regular rhythm; no rubs or gallups.  Radial, dorsalis pedis and posterior tibial pulses 2+ bilaterally.  Cap refill <2 seconds.  Respiratory: Effort normal. Lungs clear to auscultation bilaterally. No crackles/rubs/wheezes.  Good air movement.  GI:  There is no tenderness; rebound or guarding. No distension. Normal bowel sounds.  MSK: Normal range of motion. No peripheral edema or calf tenderness.  Neurological: Alert, attentive.  Skin: Skin is warm and dry.  No rashes or petechiae.  Psychiatric: Normal affect.    Emergency Department Course   ECG:  ECG (13:57:31):  Rate 50 bpm. VT interval 158. QRS duration 132. QT/QTc 486/443. P-R-T axes 32 -27 5. Sinus bradycardia. Right bundle branch block. Abnormal ECG. Agree with computer interpretation. No significant change compared to EKG dated 9/30/18 Interpreted at 1400 by Connie Shoemaker APRN.    Imaging:  Radiographic findings were communicated with the patient who voiced understanding of the findings.    Chest XR, PA, & LAT  Age indeterminate displaced fracture of the left seventh  rib posteriorly.  As read by Radiology.    Laboratory:  Troponin 1 (1406): <0.015  CBC: HGB 12.5 (L), PLT 85 (L), o/w WNL (WBC 6.0)  BMP: Glucose 104 (H),  potassium 5.5 (H), GFR 53 (L) , o/w WNL (Creatinine: 1.22)  Phenytoin level: pending    Interventions:  1438: Vasotec 1.25 mg IV    Emergency Department Course:  1400: Nursing notes and vitals reviewed. I performed an exam of the patient as documented above.     IV inserted. Medicine administered as documented above. Blood drawn. This was sent to the lab for further testing, results above.    The patient was sent for a chest xray while in the emergency department, findings above.     1515: I rechecked the patient and discussed the results of his workup thus far.     Findings and plan explained to the Patient.     I discussed admission with the patient, but he declined.     The importance of close follow-up was reviewed.     I personally reviewed the laboratory results with the Patient and answered all related questions.    Patient signed out AMA.   Impression & Plan      Medical Decision Making:  Kofi Haddad is a 86 year old male For evaluation of syncopal episode as detailed above.  Here he has bradycardic appears baseline per patient.  EKG without other underlying arrhythmia or evidence of ischemia.  Initial troponin was negative.  Chest x-ray did reveal an age-indeterminate left posterior rib fracture.  Patient has no swelling or pain over site and I doubt acute fracture.  This did not appear consistent with seizure activity.  Dilantin level is pending.  Here he he is hypertensive.  There is no evidence of endorgan damage.  Blood pressure was lowered to systolic level under 200.  His history of severe aortic stenosis and syncopal episode is concerning for brain hypoperfusion causing syncope.  For this reason I strongly recommended hospitalization for further evaluation and work-up.  Patient is refusing at this time as he does not wish to undergo procedures or treatment for aortic stenosis.  He has had 2 separate visits with cardiology to discuss pacemaker placement and procedure, but he has opted to  decline given his advanced age.  I did discuss the risk of death and permanent disability was discharged home and patient is accepting of risk.  He did agree to have close follow-up in clinic and will return with further syncopal episodes, chest pain or any further concerns.      Critical Care time:  none    Diagnosis:    ICD-10-CM    1. Syncope R55    2. Bradycardia R00.1    3. Aortic stenosis I35.0        Disposition:  Signed out AMA.     I, Evelin Pearson, am serving as a scribe at 2:01 PM on 7/1/2019 to document services personally performed by Connie Shoemaker APRN based on my observations and the provider's statements to me.      Evelin Pearson  7/1/2019   [facility] EMERGENCY DEPARTMENT       Connie Shoemaker APRN CNP  07/01/19 1833

## 2019-07-01 NOTE — ED AVS SNAPSHOT
[facility] Emergency Department  [address]  [facility] [address]  Phone:  [phone]  Fax:  [phone]                                    Kofi Haddad   MRN: [id number]    Department:  [facility] Emergency Department   Date of Visit:  7/1/2019           After Visit Summary Signature Page    I have received my discharge instructions, and my questions have been answered. I have discussed any challenges I see with this plan with the nurse or doctor.    ..........................................................................................................................................  Patient/Patient Representative Signature      ..........................................................................................................................................  Patient Representative Print Name and Relationship to Patient    ..................................................               ................................................  Date                                   Time    ..........................................................................................................................................  Reviewed by Signature/Title    ...................................................              ..............................................  Date                                               Time          22EPIC Rev 08/18

## 2019-07-01 NOTE — DISCHARGE INSTRUCTIONS
You were recommended admission to the hospital for further testing and management of your heart.  I do hope you follow-up with your primary clinic tomorrow and cardiology as soon as possible.  Please return to emergency department with further episode of syncope, chest pain, shortness of breath or any further concerns.    Discharge Instructions  Syncope    Syncope (fainting) is a sudden, short loss of consciousness (passing out spell). People will usually fall to the ground when they faint or slump over if seated.  People may also shake when this happens, and it can sometimes be difficult to tell the difference between syncope and a seizure. At this time, your provider does not find a reason to suspect that your fainting spell is a sign of anything dangerous or life-threatening.  However, sometimes the signs of serious illness do not show up right away.     Generally, every Emergency Department visit should have a follow-up clinic visit with either a primary or a specialty clinic/provider. Please follow-up as instructed by your emergency provider today.    Return to the Emergency Department if:  You faint again.   You have any significant bleeding.  You have chest pain or a fast or irregular heartbeat.  You feel short of breath.  You cough up any blood.  You have abdominal (belly) pain or unusual back pain.  You have ongoing vomiting (throwing up) or diarrhea (loose stools).  You have a black or tarry bowel movement, or blood in the stool or in your vomit.  You have a fever over 101 F.  You lose feeling or cannot move a part of your body or cannot talk normally.  You are confused, have a headache, cannot see well, or have a seizure.  DO NOT DRIVE. CALL 911 INSTEAD!    What can I do to help myself?  Follow any specific instructions that your provider discussed with you.  If you feel light-headed, make sure to sit down right away, even if you have to sit on the floor.  Follow up with your regular medical provider as  discussed for further management. This may include lowering your blood pressure medications, insulin or other diabetic medications, checking your blood sugar more frequently, and drinking more fluids, taking medicines for vomiting or diarrhea or getting up slower.  If you were given a prescription for medicine here today, be sure to read all of the information (including the package insert) that comes with your prescription.  This will include important information about the medicine, its side effects, and any warnings that you need to know about.  The pharmacist who fills the prescription can provide more information and answer questions you may have about the medicine.  If you have questions or concerns that the pharmacist cannot address, please call or return to the Emergency Department.   Remember that you can always come back to the Emergency Department if you are not able to see your regular provider in the amount of time listed above, if you get any new symptoms, or if there is anything that worries you.

## 2019-07-01 NOTE — ED PROVIDER NOTES
Emergency Department Attending Supervision Note  7/1/2019  3:29 PM      I evaluated this patient in conjunction with Connie Shoemaker NP      Briefly, the patient presented with  Severe aortic stenosis and a recent presyncopal episode. The patient reports that he was doing yard work earlier today when he tried to stand up. He felt lightheaded but denies passing out. A neighbor saw the incident and helped him to the ground. The patient reports that he has seen cardiology for the aortic stenosis and has refused surgery or pacemaker.    On my exam,   General: The patient is alert, in no respiratory distress.    HENT: Mucous membranes moist.    Cardiovascular: loud systolic murmur Regular rate and rhythm. Good pulses in all four extremities. Normal capillary refill and skin turgor.     Respiratory: Lungs are clear. No nasal flaring. No retractions. No wheezing, no crackles.    Gastrointestinal: Abdomen soft. No guarding, no rebound. No palpable hernias.     Musculoskeletal: No gross deformity.     Skin: No rashes or petechiae.     Neurologic: The patient is alert and oriented x3. GCS 15. No testable cranial nerve deficit. Follows commands with clear and appropriate speech. Gives appropriate answers. Good strength in all extremities. No gross neurologic deficit. Gross sensation intact.     Lymphatic: No cervical adenopathy. No lower extremity swelling.    Psychiatric: The patient is non-tearful.      Results:  CBC wnl except hemoglobin 12.5, hematocrit 38  BMP wnl except potassium 5.5 - hemolyzed  Glucose 104  Troponin negative  Dilantin 6.3  EKG demonstrated bradycardia    ED course:  The patient was examined in conjunction with Connie Shoemaker. The patient had a presynopal episode which is concerning with his history of severe aortic stenosis. I advised the patient that he must stay in the hospital. He refused despite my discussion with him regarding the risk to his health, life, heart and permanent disability. The patient voiced  understanding but said that he was 86 years old and didn't want anything done and did not want to come to the hospital in the first place. It does not sound like a seizure. He left against medical advice and was aware of the risks. He has mild bradycardia right now but is not symptomatic. His blood pressure was treated to a level below 200.    My impression is   (R55) Syncope    (R00.1) Bradycardia    (I35.0) Aortic stenosis  Hypertensive urgency            Diagnosis    ICD-10-CM    1. Syncope R55    2. Bradycardia R00.1    3. Aortic stenosis I35.0          Connie Shoemaker, HENOK *        Julio White MD  07/01/19 1532

## 2019-07-02 LAB — INTERPRETATION ECG - MUSE: NORMAL

## 2019-11-15 ENCOUNTER — APPOINTMENT (OUTPATIENT)
Dept: CT IMAGING | Facility: CLINIC | Age: 84
DRG: 193 | End: 2019-11-15
Attending: EMERGENCY MEDICINE
Payer: MEDICARE

## 2019-11-15 ENCOUNTER — APPOINTMENT (OUTPATIENT)
Dept: GENERAL RADIOLOGY | Facility: CLINIC | Age: 84
DRG: 193 | End: 2019-11-15
Attending: EMERGENCY MEDICINE
Payer: MEDICARE

## 2019-11-15 ENCOUNTER — HOSPITAL ENCOUNTER (OUTPATIENT)
Facility: CLINIC | Age: 84
Discharge: HOME HEALTH | DRG: 193 | End: 2019-11-16
Attending: EMERGENCY MEDICINE | Admitting: INTERNAL MEDICINE
Payer: MEDICARE

## 2019-11-15 DIAGNOSIS — B99.9 FEVER DUE TO INFECTION: ICD-10-CM

## 2019-11-15 DIAGNOSIS — N17.9 AKI (ACUTE KIDNEY INJURY) (H): ICD-10-CM

## 2019-11-15 DIAGNOSIS — J11.1 INFLUENZA-LIKE ILLNESS: ICD-10-CM

## 2019-11-15 DIAGNOSIS — R79.89 ELEVATED TROPONIN: ICD-10-CM

## 2019-11-15 DIAGNOSIS — J18.9 COMMUNITY ACQUIRED PNEUMONIA, BILATERAL: Primary | ICD-10-CM

## 2019-11-15 LAB
ALBUMIN SERPL-MCNC: 3.7 G/DL (ref 3.4–5)
ALP SERPL-CCNC: 108 U/L (ref 40–150)
ALT SERPL W P-5'-P-CCNC: 19 U/L (ref 0–70)
ANION GAP SERPL CALCULATED.3IONS-SCNC: 6 MMOL/L (ref 3–14)
AST SERPL W P-5'-P-CCNC: 42 U/L (ref 0–45)
BASOPHILS # BLD AUTO: 0 10E9/L (ref 0–0.2)
BASOPHILS NFR BLD AUTO: 0.2 %
BILIRUB SERPL-MCNC: 0.4 MG/DL (ref 0.2–1.3)
BUN SERPL-MCNC: 38 MG/DL (ref 7–30)
CALCIUM SERPL-MCNC: 8.5 MG/DL (ref 8.5–10.1)
CHLORIDE SERPL-SCNC: 106 MMOL/L (ref 94–109)
CK SERPL-CCNC: 410 U/L (ref 30–300)
CO2 BLDCOV-SCNC: 23 MMOL/L (ref 21–28)
CO2 SERPL-SCNC: 24 MMOL/L (ref 20–32)
CREAT SERPL-MCNC: 1.53 MG/DL (ref 0.66–1.25)
DIFFERENTIAL METHOD BLD: ABNORMAL
EOSINOPHIL # BLD AUTO: 0 10E9/L (ref 0–0.7)
EOSINOPHIL NFR BLD AUTO: 0.2 %
ERYTHROCYTE [DISTWIDTH] IN BLOOD BY AUTOMATED COUNT: 14.1 % (ref 10–15)
FLUAV+FLUBV AG SPEC QL: NEGATIVE
FLUAV+FLUBV AG SPEC QL: NEGATIVE
GFR SERPL CREATININE-BSD FRML MDRD: 40 ML/MIN/{1.73_M2}
GLUCOSE SERPL-MCNC: 116 MG/DL (ref 70–99)
HCT VFR BLD AUTO: 40.8 % (ref 40–53)
HGB BLD-MCNC: 13.6 G/DL (ref 13.3–17.7)
IMM GRANULOCYTES # BLD: 0 10E9/L (ref 0–0.4)
IMM GRANULOCYTES NFR BLD: 0.2 %
LACTATE BLD-SCNC: 1.2 MMOL/L (ref 0.7–2.1)
LYMPHOCYTES # BLD AUTO: 1.2 10E9/L (ref 0.8–5.3)
LYMPHOCYTES NFR BLD AUTO: 27.4 %
MCH RBC QN AUTO: 31.9 PG (ref 26.5–33)
MCHC RBC AUTO-ENTMCNC: 33.3 G/DL (ref 31.5–36.5)
MCV RBC AUTO: 96 FL (ref 78–100)
MONOCYTES # BLD AUTO: 0.4 10E9/L (ref 0–1.3)
MONOCYTES NFR BLD AUTO: 8.4 %
NEUTROPHILS # BLD AUTO: 2.7 10E9/L (ref 1.6–8.3)
NEUTROPHILS NFR BLD AUTO: 63.6 %
NRBC # BLD AUTO: 0 10*3/UL
NRBC BLD AUTO-RTO: 0 /100
NT-PROBNP SERPL-MCNC: 9085 PG/ML (ref 0–1800)
PCO2 BLDV: 41 MM HG (ref 40–50)
PH BLDV: 7.36 PH (ref 7.32–7.43)
PLATELET # BLD AUTO: 120 10E9/L (ref 150–450)
PO2 BLDV: 25 MM HG (ref 25–47)
POTASSIUM SERPL-SCNC: 4.5 MMOL/L (ref 3.4–5.3)
PROT SERPL-MCNC: 8.3 G/DL (ref 6.8–8.8)
RBC # BLD AUTO: 4.27 10E12/L (ref 4.4–5.9)
SAO2 % BLDV FROM PO2: 42 %
SODIUM SERPL-SCNC: 136 MMOL/L (ref 133–144)
SPECIMEN SOURCE: NORMAL
TROPONIN I SERPL-MCNC: 0.1 UG/L (ref 0–0.04)
WBC # BLD AUTO: 4.2 10E9/L (ref 4–11)

## 2019-11-15 PROCEDURE — 93005 ELECTROCARDIOGRAM TRACING: CPT

## 2019-11-15 PROCEDURE — 87040 BLOOD CULTURE FOR BACTERIA: CPT | Performed by: EMERGENCY MEDICINE

## 2019-11-15 PROCEDURE — 83880 ASSAY OF NATRIURETIC PEPTIDE: CPT | Performed by: EMERGENCY MEDICINE

## 2019-11-15 PROCEDURE — 82803 BLOOD GASES ANY COMBINATION: CPT

## 2019-11-15 PROCEDURE — 96361 HYDRATE IV INFUSION ADD-ON: CPT

## 2019-11-15 PROCEDURE — 70450 CT HEAD/BRAIN W/O DYE: CPT

## 2019-11-15 PROCEDURE — 25800030 ZZH RX IP 258 OP 636: Performed by: EMERGENCY MEDICINE

## 2019-11-15 PROCEDURE — G0378 HOSPITAL OBSERVATION PER HR: HCPCS

## 2019-11-15 PROCEDURE — 25000132 ZZH RX MED GY IP 250 OP 250 PS 637: Mod: GY | Performed by: EMERGENCY MEDICINE

## 2019-11-15 PROCEDURE — 36415 COLL VENOUS BLD VENIPUNCTURE: CPT | Performed by: EMERGENCY MEDICINE

## 2019-11-15 PROCEDURE — 80053 COMPREHEN METABOLIC PANEL: CPT | Performed by: EMERGENCY MEDICINE

## 2019-11-15 PROCEDURE — 96360 HYDRATION IV INFUSION INIT: CPT

## 2019-11-15 PROCEDURE — 85025 COMPLETE CBC W/AUTO DIFF WBC: CPT | Performed by: EMERGENCY MEDICINE

## 2019-11-15 PROCEDURE — 51798 US URINE CAPACITY MEASURE: CPT

## 2019-11-15 PROCEDURE — 99285 EMERGENCY DEPT VISIT HI MDM: CPT | Mod: 25

## 2019-11-15 PROCEDURE — 99220 ZZC INITIAL OBSERVATION CARE,LEVL III: CPT | Performed by: INTERNAL MEDICINE

## 2019-11-15 PROCEDURE — 83605 ASSAY OF LACTIC ACID: CPT

## 2019-11-15 PROCEDURE — 71046 X-RAY EXAM CHEST 2 VIEWS: CPT

## 2019-11-15 PROCEDURE — 82550 ASSAY OF CK (CPK): CPT | Performed by: EMERGENCY MEDICINE

## 2019-11-15 PROCEDURE — 84484 ASSAY OF TROPONIN QUANT: CPT | Performed by: EMERGENCY MEDICINE

## 2019-11-15 PROCEDURE — 87804 INFLUENZA ASSAY W/OPTIC: CPT | Performed by: EMERGENCY MEDICINE

## 2019-11-15 RX ORDER — ACETAMINOPHEN 500 MG
1000 TABLET ORAL ONCE
Status: COMPLETED | OUTPATIENT
Start: 2019-11-15 | End: 2019-11-15

## 2019-11-15 RX ORDER — SODIUM CHLORIDE 9 MG/ML
INJECTION, SOLUTION INTRAVENOUS ONCE
Status: COMPLETED | OUTPATIENT
Start: 2019-11-15 | End: 2019-11-15

## 2019-11-15 RX ADMIN — ACETAMINOPHEN 1000 MG: 500 TABLET, FILM COATED ORAL at 20:11

## 2019-11-15 RX ADMIN — SODIUM CHLORIDE 1000 ML: 9 INJECTION, SOLUTION INTRAVENOUS at 20:01

## 2019-11-15 RX ADMIN — SODIUM CHLORIDE: 9 INJECTION, SOLUTION INTRAVENOUS at 22:58

## 2019-11-15 ASSESSMENT — ENCOUNTER SYMPTOMS
ACTIVITY CHANGE: 1
FEVER: 0
COUGH: 0
RHINORRHEA: 0
SHORTNESS OF BREATH: 0
MYALGIAS: 1

## 2019-11-16 ENCOUNTER — APPOINTMENT (OUTPATIENT)
Dept: OCCUPATIONAL THERAPY | Facility: CLINIC | Age: 84
DRG: 193 | End: 2019-11-16
Attending: INTERNAL MEDICINE
Payer: MEDICARE

## 2019-11-16 ENCOUNTER — APPOINTMENT (OUTPATIENT)
Dept: CT IMAGING | Facility: CLINIC | Age: 84
DRG: 193 | End: 2019-11-16
Attending: INTERNAL MEDICINE
Payer: MEDICARE

## 2019-11-16 VITALS
OXYGEN SATURATION: 95 % | WEIGHT: 144.5 LBS | SYSTOLIC BLOOD PRESSURE: 130 MMHG | TEMPERATURE: 97.9 F | RESPIRATION RATE: 18 BRPM | HEIGHT: 65 IN | DIASTOLIC BLOOD PRESSURE: 69 MMHG | BODY MASS INDEX: 24.07 KG/M2 | HEART RATE: 52 BPM

## 2019-11-16 PROBLEM — J18.9 COMMUNITY ACQUIRED PNEUMONIA, BILATERAL: Status: ACTIVE | Noted: 2019-11-16

## 2019-11-16 PROBLEM — R50.9 FEVER: Status: ACTIVE | Noted: 2019-11-16

## 2019-11-16 LAB
ALBUMIN UR-MCNC: 30 MG/DL
ANION GAP SERPL CALCULATED.3IONS-SCNC: 5 MMOL/L (ref 3–14)
APPEARANCE UR: CLEAR
BACTERIA #/AREA URNS HPF: ABNORMAL /HPF
BASOPHILS # BLD AUTO: 0 10E9/L (ref 0–0.2)
BASOPHILS NFR BLD AUTO: 0 %
BILIRUB UR QL STRIP: NEGATIVE
BUN SERPL-MCNC: 39 MG/DL (ref 7–30)
CALCIUM SERPL-MCNC: 8 MG/DL (ref 8.5–10.1)
CHLORIDE SERPL-SCNC: 110 MMOL/L (ref 94–109)
CK SERPL-CCNC: 455 U/L (ref 30–300)
CO2 SERPL-SCNC: 24 MMOL/L (ref 20–32)
COLOR UR AUTO: ABNORMAL
CREAT SERPL-MCNC: 1.49 MG/DL (ref 0.66–1.25)
DIFFERENTIAL METHOD BLD: ABNORMAL
EOSINOPHIL # BLD AUTO: 0 10E9/L (ref 0–0.7)
EOSINOPHIL NFR BLD AUTO: 0 %
ERYTHROCYTE [DISTWIDTH] IN BLOOD BY AUTOMATED COUNT: 14.2 % (ref 10–15)
GFR SERPL CREATININE-BSD FRML MDRD: 42 ML/MIN/{1.73_M2}
GLUCOSE SERPL-MCNC: 88 MG/DL (ref 70–99)
GLUCOSE UR STRIP-MCNC: NEGATIVE MG/DL
HCT VFR BLD AUTO: 35.7 % (ref 40–53)
HGB BLD-MCNC: 11.8 G/DL (ref 13.3–17.7)
HGB UR QL STRIP: ABNORMAL
KETONES UR STRIP-MCNC: 10 MG/DL
LEUKOCYTE ESTERASE UR QL STRIP: NEGATIVE
LYMPHOCYTES # BLD AUTO: 1.2 10E9/L (ref 0.8–5.3)
LYMPHOCYTES NFR BLD AUTO: 29 %
MCH RBC QN AUTO: 32.2 PG (ref 26.5–33)
MCHC RBC AUTO-ENTMCNC: 33.1 G/DL (ref 31.5–36.5)
MCV RBC AUTO: 98 FL (ref 78–100)
MONOCYTES # BLD AUTO: 0.2 10E9/L (ref 0–1.3)
MONOCYTES NFR BLD AUTO: 6 %
MUCOUS THREADS #/AREA URNS LPF: PRESENT /LPF
NEUTROPHILS # BLD AUTO: 2.7 10E9/L (ref 1.6–8.3)
NEUTROPHILS NFR BLD AUTO: 65 %
NITRATE UR QL: NEGATIVE
PH UR STRIP: 5 PH (ref 5–7)
PHENYTOIN SERPL-MCNC: 3.6 MG/L (ref 10–20)
PLATELET # BLD AUTO: 88 10E9/L (ref 150–450)
PLATELET # BLD EST: ABNORMAL 10*3/UL
POTASSIUM SERPL-SCNC: 4.7 MMOL/L (ref 3.4–5.3)
PROCALCITONIN SERPL-MCNC: 0.14 NG/ML
RBC # BLD AUTO: 3.66 10E12/L (ref 4.4–5.9)
RBC #/AREA URNS AUTO: 2 /HPF (ref 0–2)
RBC MORPH BLD: ABNORMAL
SODIUM SERPL-SCNC: 139 MMOL/L (ref 133–144)
SOURCE: ABNORMAL
SP GR UR STRIP: 1.02 (ref 1–1.03)
SQUAMOUS #/AREA URNS AUTO: <1 /HPF (ref 0–1)
TROPONIN I SERPL-MCNC: 0.16 UG/L (ref 0–0.04)
TROPONIN I SERPL-MCNC: 0.18 UG/L (ref 0–0.04)
UROBILINOGEN UR STRIP-MCNC: NORMAL MG/DL (ref 0–2)
WBC # BLD AUTO: 4.1 10E9/L (ref 4–11)
WBC #/AREA URNS AUTO: 2 /HPF (ref 0–5)

## 2019-11-16 PROCEDURE — 36415 COLL VENOUS BLD VENIPUNCTURE: CPT | Performed by: INTERNAL MEDICINE

## 2019-11-16 PROCEDURE — 71250 CT THORAX DX C-: CPT

## 2019-11-16 PROCEDURE — G0378 HOSPITAL OBSERVATION PER HR: HCPCS

## 2019-11-16 PROCEDURE — 12000011 ZZH R&B MS OVERFLOW

## 2019-11-16 PROCEDURE — 25000132 ZZH RX MED GY IP 250 OP 250 PS 637: Mod: GY | Performed by: INTERNAL MEDICINE

## 2019-11-16 PROCEDURE — 99239 HOSP IP/OBS DSCHRG MGMT >30: CPT | Performed by: INTERNAL MEDICINE

## 2019-11-16 PROCEDURE — 25000128 H RX IP 250 OP 636: Performed by: INTERNAL MEDICINE

## 2019-11-16 PROCEDURE — 85025 COMPLETE CBC W/AUTO DIFF WBC: CPT | Performed by: INTERNAL MEDICINE

## 2019-11-16 PROCEDURE — 84145 PROCALCITONIN (PCT): CPT | Performed by: INTERNAL MEDICINE

## 2019-11-16 PROCEDURE — 82550 ASSAY OF CK (CPK): CPT | Performed by: INTERNAL MEDICINE

## 2019-11-16 PROCEDURE — 80048 BASIC METABOLIC PNL TOTAL CA: CPT | Performed by: INTERNAL MEDICINE

## 2019-11-16 PROCEDURE — 80185 ASSAY OF PHENYTOIN TOTAL: CPT | Performed by: INTERNAL MEDICINE

## 2019-11-16 PROCEDURE — 96374 THER/PROPH/DIAG INJ IV PUSH: CPT

## 2019-11-16 PROCEDURE — 25000128 H RX IP 250 OP 636: Performed by: HOSPITALIST

## 2019-11-16 PROCEDURE — 25800030 ZZH RX IP 258 OP 636: Performed by: INTERNAL MEDICINE

## 2019-11-16 PROCEDURE — 84484 ASSAY OF TROPONIN QUANT: CPT | Performed by: INTERNAL MEDICINE

## 2019-11-16 PROCEDURE — 40000275 ZZH STATISTIC RCP TIME EA 10 MIN

## 2019-11-16 PROCEDURE — 40000893 ZZH STATISTIC PT IP EVAL DEFER

## 2019-11-16 PROCEDURE — 25000125 ZZHC RX 250: Performed by: INTERNAL MEDICINE

## 2019-11-16 PROCEDURE — 87633 RESP VIRUS 12-25 TARGETS: CPT | Performed by: INTERNAL MEDICINE

## 2019-11-16 PROCEDURE — 81001 URINALYSIS AUTO W/SCOPE: CPT | Performed by: INTERNAL MEDICINE

## 2019-11-16 RX ORDER — AZITHROMYCIN 250 MG/1
250 TABLET, FILM COATED ORAL DAILY
Qty: 4 TABLET | Refills: 0 | Status: SHIPPED | OUTPATIENT
Start: 2019-11-16 | End: 2019-11-20

## 2019-11-16 RX ORDER — SODIUM CHLORIDE 9 MG/ML
INJECTION, SOLUTION INTRAVENOUS CONTINUOUS
Status: DISCONTINUED | OUTPATIENT
Start: 2019-11-16 | End: 2019-11-16 | Stop reason: HOSPADM

## 2019-11-16 RX ORDER — PHENYTOIN SODIUM 100 MG/1
100 CAPSULE, EXTENDED RELEASE ORAL 3 TIMES DAILY
Status: DISCONTINUED | OUTPATIENT
Start: 2019-11-16 | End: 2019-11-16 | Stop reason: HOSPADM

## 2019-11-16 RX ORDER — PHENYTOIN SODIUM 100 MG/1
100 CAPSULE, EXTENDED RELEASE ORAL DAILY
Status: DISCONTINUED | OUTPATIENT
Start: 2019-11-16 | End: 2019-11-16

## 2019-11-16 RX ORDER — ACETAMINOPHEN 650 MG/1
650 SUPPOSITORY RECTAL EVERY 4 HOURS PRN
Status: DISCONTINUED | OUTPATIENT
Start: 2019-11-16 | End: 2019-11-16 | Stop reason: HOSPADM

## 2019-11-16 RX ORDER — ONDANSETRON 2 MG/ML
4 INJECTION INTRAMUSCULAR; INTRAVENOUS EVERY 6 HOURS PRN
Status: DISCONTINUED | OUTPATIENT
Start: 2019-11-16 | End: 2019-11-16 | Stop reason: HOSPADM

## 2019-11-16 RX ORDER — ACETAMINOPHEN 325 MG/1
325-650 TABLET ORAL EVERY 4 HOURS PRN
Qty: 50 TABLET | Refills: 0 | Status: ON HOLD | OUTPATIENT
Start: 2019-11-16 | End: 2021-01-01

## 2019-11-16 RX ORDER — AZITHROMYCIN 250 MG/1
250 TABLET, FILM COATED ORAL DAILY
Status: DISCONTINUED | OUTPATIENT
Start: 2019-11-17 | End: 2019-11-16 | Stop reason: HOSPADM

## 2019-11-16 RX ORDER — IPRATROPIUM BROMIDE AND ALBUTEROL SULFATE 2.5; .5 MG/3ML; MG/3ML
3 SOLUTION RESPIRATORY (INHALATION) 4 TIMES DAILY
Status: DISCONTINUED | OUTPATIENT
Start: 2019-11-16 | End: 2019-11-16 | Stop reason: HOSPADM

## 2019-11-16 RX ORDER — ALBUTEROL SULFATE 0.83 MG/ML
2.5 SOLUTION RESPIRATORY (INHALATION) EVERY 4 HOURS PRN
Status: DISCONTINUED | OUTPATIENT
Start: 2019-11-16 | End: 2019-11-16 | Stop reason: HOSPADM

## 2019-11-16 RX ORDER — SODIUM CHLORIDE, SODIUM LACTATE, POTASSIUM CHLORIDE, CALCIUM CHLORIDE 600; 310; 30; 20 MG/100ML; MG/100ML; MG/100ML; MG/100ML
INJECTION, SOLUTION INTRAVENOUS CONTINUOUS
Status: DISCONTINUED | OUTPATIENT
Start: 2019-11-16 | End: 2019-11-16

## 2019-11-16 RX ORDER — ATORVASTATIN CALCIUM 40 MG/1
40 TABLET, FILM COATED ORAL EVERY EVENING
Status: DISCONTINUED | OUTPATIENT
Start: 2019-11-16 | End: 2019-11-16

## 2019-11-16 RX ORDER — HYDRALAZINE HYDROCHLORIDE 20 MG/ML
10 INJECTION INTRAMUSCULAR; INTRAVENOUS EVERY 6 HOURS PRN
Status: DISCONTINUED | OUTPATIENT
Start: 2019-11-16 | End: 2019-11-16 | Stop reason: HOSPADM

## 2019-11-16 RX ORDER — ACETAMINOPHEN 325 MG/1
650 TABLET ORAL EVERY 4 HOURS PRN
Status: DISCONTINUED | OUTPATIENT
Start: 2019-11-16 | End: 2019-11-16 | Stop reason: HOSPADM

## 2019-11-16 RX ORDER — CALCIUM CARBONATE 500 MG/1
500 TABLET, CHEWABLE ORAL DAILY PRN
Status: DISCONTINUED | OUTPATIENT
Start: 2019-11-16 | End: 2019-11-16 | Stop reason: HOSPADM

## 2019-11-16 RX ORDER — HEPARIN SODIUM 5000 [USP'U]/.5ML
5000 INJECTION, SOLUTION INTRAVENOUS; SUBCUTANEOUS EVERY 12 HOURS
Status: DISCONTINUED | OUTPATIENT
Start: 2019-11-16 | End: 2019-11-16 | Stop reason: HOSPADM

## 2019-11-16 RX ORDER — LISINOPRIL 10 MG/1
10 TABLET ORAL EVERY MORNING
Status: DISCONTINUED | OUTPATIENT
Start: 2019-11-16 | End: 2019-11-16 | Stop reason: HOSPADM

## 2019-11-16 RX ORDER — CEFTRIAXONE 2 G/1
2 INJECTION, POWDER, FOR SOLUTION INTRAMUSCULAR; INTRAVENOUS EVERY 24 HOURS
Status: DISCONTINUED | OUTPATIENT
Start: 2019-11-16 | End: 2019-11-16 | Stop reason: HOSPADM

## 2019-11-16 RX ORDER — ONDANSETRON 4 MG/1
4 TABLET, ORALLY DISINTEGRATING ORAL EVERY 6 HOURS PRN
Status: DISCONTINUED | OUTPATIENT
Start: 2019-11-16 | End: 2019-11-16 | Stop reason: HOSPADM

## 2019-11-16 RX ORDER — POLYETHYLENE GLYCOL 3350 17 G/17G
17 POWDER, FOR SOLUTION ORAL DAILY PRN
Status: DISCONTINUED | OUTPATIENT
Start: 2019-11-16 | End: 2019-11-16 | Stop reason: HOSPADM

## 2019-11-16 RX ADMIN — SODIUM CHLORIDE: 9 INJECTION, SOLUTION INTRAVENOUS at 11:02

## 2019-11-16 RX ADMIN — IPRATROPIUM BROMIDE AND ALBUTEROL SULFATE 3 ML: .5; 3 SOLUTION RESPIRATORY (INHALATION) at 11:21

## 2019-11-16 RX ADMIN — LISINOPRIL 10 MG: 10 TABLET ORAL at 09:36

## 2019-11-16 RX ADMIN — AZITHROMYCIN MONOHYDRATE 500 MG: 500 INJECTION, POWDER, LYOPHILIZED, FOR SOLUTION INTRAVENOUS at 12:12

## 2019-11-16 RX ADMIN — PHENYTOIN SODIUM 100 MG: 100 CAPSULE ORAL at 09:36

## 2019-11-16 RX ADMIN — PHENYTOIN SODIUM 100 MG: 100 CAPSULE ORAL at 14:46

## 2019-11-16 RX ADMIN — SODIUM CHLORIDE, POTASSIUM CHLORIDE, SODIUM LACTATE AND CALCIUM CHLORIDE: 600; 310; 30; 20 INJECTION, SOLUTION INTRAVENOUS at 02:15

## 2019-11-16 RX ADMIN — HEPARIN SODIUM 5000 UNITS: 5000 INJECTION, SOLUTION INTRAVENOUS; SUBCUTANEOUS at 09:36

## 2019-11-16 RX ADMIN — HYDRALAZINE HYDROCHLORIDE 10 MG: 20 INJECTION INTRAMUSCULAR; INTRAVENOUS at 04:32

## 2019-11-16 RX ADMIN — CEFTRIAXONE 2 G: 2 INJECTION, POWDER, FOR SOLUTION INTRAMUSCULAR; INTRAVENOUS at 11:03

## 2019-11-16 ASSESSMENT — ACTIVITIES OF DAILY LIVING (ADL): PREVIOUS_RESPONSIBILITIES: MEAL PREP;HOUSEKEEPING;LAUNDRY;SHOPPING;YARDWORK;MEDICATION MANAGEMENT;FINANCES;DRIVING

## 2019-11-16 ASSESSMENT — MIFFLIN-ST. JEOR: SCORE: 1262.33

## 2019-11-16 NOTE — H&P
[facility]    History and Physical - Hospitalist Service       Date of Admission:  11/15/2019     Assessment & Plan   Kofi Haddad is a 86 year old male with a history of CKD stage III, hypertension, and seizure disorder who is admitted to the hospitalist service for evaluation of fever and alteration in mental status.    Fever  - Patient's temp was elevated to 102.9 in the ED.  - He does not have signs of sepsis and that he has no tachycardia, tachypnea or elevation of white count.  - Unclear the etiology of this.  Influenza negative.  Chest x-ray without obvious pneumonia.  Urinalysis not collected due to patient not billed for the sample so far.  No sign of joint inflammation, rash, and no belly pain. Possible viral process.   - Feel comfortable holding off on antibiotics for now as there is no sign of sepsis.  Blood cultures were drawn.  - Order chest CT to evaluate for occult pneumonia.  - Check respiratory viral panel.    Hypoxia  - Patient denied being short of breath but was needing 2 L to maintain sats above 90% in the ED.  - When oxygen was taken off, his saturation dipped to 83%.  - He has a known history of severe aortic stenosis but no known heart failure.  - Chest x-ray did not show any acute process.  - With the fever and hypoxia, will get a chest CT to evaluate for possible pneumonia not seen on - chest x-ray.    Acute encephalopathy, unclear etiology and unable to determine at this time  - On exam, the patient is oriented to self, being in a hospital and to the year. He is completely appropriate, answers questions well, and pleasant, but does not have a good recall of events  - Presentation is not consistent with seizures. Doubt meningitis with well appearance. No signs of sepsis. CT of the head was negative.   - Spoke with daughter Ro who states at baseline patient does not have any apparent memory difficulties.  - This is a significant departure from patient's normal mental  status but would wait to see how his clinical course evolves before ordering further evaluation.     HANY on CKD stage III  - BUN 38 and CR 1.53 on admission are above his baseline  - He appears clinically dry. This is likely prerenal. Check CK given that he was found laying in bed.   - Received NS in ED, will do continuous LR on floor  - Monitor Cr, trend, avoid nephrotoxins    Elevated troponin, likely type II MI  - Troponin elevated at 0.1 on initial labs. May be related to HANY.   - Unable to tell definitively but he does not appear to have chest pain. His EKG shows T wave inversions in III and aVF but these were seen previously.   - Trend troponin x2 overnight     Diet: Regular  DVT Prophylaxis: Heparin SQ  Cortes Catheter: No  Code Status: DNR/DNI    Disposition Plan   Expected discharge: 1-2 midnights, depending on clinical course. PT/OT consult.   Entered: Devendra Werner MD at 11:23 PM     Dveendra Werner MD  [facility]    ______________________________________________________________________    Chief Complaint   Altered Mental Status    History of Present Illness   Kofi Haddad is a 86 year old male with a history of CKD stage III, hypertension, seizure disorder, who presented to the emergency department via EMS for decreased level of activity.    History is obtained from chart review and from discussion with patient's daughter.  She stated that the patient's neighbor noticed that he was not out walking the dog like usual, prompting concern that he was not doing well.  Patient's daughter and her sister therefore went to the patient's house and found him lying in the bed with his legs hanging off of the bed.   For this reason they brought him to the emergency department.  In the ED, he was noted to be febrile to 102.9.  White count was 4.2. EKG showed sinus rhythm. Troponin mildly elevated to 0.1. BUN 38 and Cr 1.53 which are above his baseline. CXR showed no acute process and  head CT negative. Influenza was negative. Admission to medical floor was requested.    On interview, the patient is pleasant and interactive but not able to provide meaningful details about his history.  He does deny being in any pain.  He denies being short of breath.  Nursing staff mentioned that they had to put him on supplemental O2 to keep his oxygen saturations up.  Patient's daughter Ro stated that as far she is aware that he does not have memory impairment at baseline.  She talks to him frequently.  She does not know of any symptoms that he had been complaining of lately.    Review of Systems    Unable to be obtained secondary to patient mental status.    Physical Exam   BP (!) 145/65   Pulse 70   Temp 102.9  F (39.4  C) (Rectal)   Resp 20   SpO2 94%        General: No acute distress, awake, pleasant.    HEENT: No scleral icterus. Oropharynx moist.     Neck: Supple.  Normal range of motion.  No meningismus.    Pulmonary: Normal work of breathing. Clear to auscultation bilaterally.    Cardiovascular: Regular rate and rhythm without murmur or extra heart sounds.    Abdomen: Soft and non-tender.    Extremities: No peripheral edema. No clubbing or cyanosis.  No joint swelling noted.    Neurologic: Awake, alert, appropriate.  Oriented to person and the year, not the date.  Knew he was in the hospital but did not know which one.    Skin: Warm and dry.  No rash.    Psychiatric: Normal affect and mood.     Data   Data reviewed today: I reviewed all medications, new labs and imaging results over the last 24 hours. I personally reviewed the EKG tracing showing Sinus rhythm. Inverted T waves in III and aVF are old..    Recent Labs   Lab 11/15/19  1950   WBC 4.2   HGB 13.6   MCV 96   *      POTASSIUM 4.5   CHLORIDE 106   CO2 24   BUN 38*   CR 1.53*   ANIONGAP 6   BRUCE 8.5   *   ALBUMIN 3.7   PROTTOTAL 8.3   BILITOTAL 0.4   ALKPHOS 108   ALT 19   AST 42   TROPI 0.100*     Recent Results (from  the past 24 hour(s))   Head CT w/o contrast    Narrative    EXAM: CT HEAD W/O CONTRAST  LOCATION: [facility]  DATE/TIME: 11/15/2019 8:20 PM    INDICATION: Confusion  COMPARISON: CT head 04/03/2006.  TECHNIQUE: Routine without IV contrast. Multiplanar reformats. Dose reduction techniques were used.    FINDINGS:  INTRACRANIAL CONTENTS: No intracranial hemorrhage, extraaxial collection, or mass effect.  No CT evidence of acute infarct. Mild presumed chronic small vessel ischemic changes. Moderate generalized volume loss. No hydrocephalus.     VISUALIZED ORBITS/SINUSES/MASTOIDS: No intraorbital abnormality. Mild mucosal thickening scattered about the paranasal sinuses. No middle ear or mastoid effusion. Debris in the external auditory canals.    BONES/SOFT TISSUES: No acute abnormality.      Impression    IMPRESSION:  1.  No acute intracranial pathology.  2.  Increased mild chronic small vessel ischemic disease and moderate generalized parenchymal volume loss since 04/03/2006.   Chest XR,  PA & LAT    Narrative    EXAM: XR CHEST 2 VW  LOCATION: [facility]  DATE/TIME: 11/15/2019 8:28 PM    INDICATION: Altered mental status, febrile  COMPARISON: 07/01/2019      Impression    IMPRESSION: Normal heart size. Prominent opacity in the right paratracheal region similar to prior exam and is seen as vasculature on prior chest CT. No pneumothorax or pleural effusion. Ununited left posterior seventh rib fracture. Lungs are clear.       Past Medical History    I have reviewed this patient's medical history and updated it with pertinent information if needed.   Past Medical History:   Diagnosis Date     Cancer (H)      High cholesterol      Hypertension      Seizures (H)         Past Surgical History    I have reviewed this patient's surgical history and updated it with pertinent information if needed.  Past Surgical History:   Procedure Laterality Date     GENITOURINARY SURGERY          Social History     I have reviewed this patient's social history and updated it with pertinent information if needed.  Social History     Tobacco Use     Smoking status: Former Smoker     Smokeless tobacco: Never Used   Substance Use Topics     Alcohol use: Not on file     Drug use: Not on file          Family History    I have reviewed this patient's family history and updated it with pertinent information if needed.   No family history on file.     Prior to Admission Medications    Prior to Admission Medications   Prescriptions Last Dose Informant Patient Reported? Taking?   RANITIDINE HCL PO   Yes No   Sig: Take 150 mg by mouth 2 times daily   atorvastatin (LIPITOR) 40 MG tablet   Yes No   Sig: Take 40 mg by mouth every evening    calcium carbonate (TUMS) 500 MG chewable tablet   Yes No   Sig: Take 1 chew tab by mouth daily as needed for heartburn   lisinopril (PRINIVIL/ZESTRIL) 10 MG tablet   Yes No   Sig: Take 10 mg by mouth every morning    phenytoin (DILANTIN) 100 MG CR capsule   Yes No   Sig: Take 100 mg by mouth 3 times daily       Facility-Administered Medications: None        Allergies    No Known Allergies

## 2019-11-16 NOTE — PLAN OF CARE
PRIMARY DIAGNOSIS: GENERALIZED WEAKNESS    OUTPATIENT/OBSERVATION GOALS TO BE MET BEFORE DISCHARGE  1. Orthostatic performed: No    2. Tolerating PO medications: Yes    3. Return to near baseline physical activity: No. Assist x1    4. Cleared for discharge by consultants (if involved): No    Discharge Planner Nurse   Safe discharge environment identified: Yes  Barriers to discharge: Yes    Aox3. Assist x1. IV 75mL/hr LR. Elevated Bp's. Denies pain. Regular Diet. Tele ordered. Continue to monitor for any changes.        Entered by: Arlette Morgan 11/16/2019 4:53 AM     Please review provider order for any additional goals.   Nurse to notify provider when observation goals have been met and patient is ready for discharge.

## 2019-11-16 NOTE — ED TRIAGE NOTES
Patient presents to ED via EMS from home. Per EMS report patients neighbors concerned for well being due to not seeing him all day, daughters called and patient found lying in bed for unknown amount of time. Very weak, unable to sit up per EMS.    Family concerned due to possible slurred speech and possible facial droop.    On arrival patient is alert to self, , and place.

## 2019-11-16 NOTE — DISCHARGE SUMMARY
[facility]    Discharge Summary  Hospitalist    Date of Admission:  11/15/2019  Date of Discharge:  11/16/2019  Discharging Provider: Connie Rodriguez MD  Date of Service (when I saw the patient): 11/16/19    Discharge Diagnoses   Community-acquired pneumonia  Fever  Metabolic encephalopathy  Acute hypoxic respiratory failure, resolved  HANY superimposed on CKD 3  Elevated troponin, likely type II MI  Severe aortic stenosis  History of seizure disorder  Hypertension    History of Present Illness   Kofi Haddad is an 86 year old man with PMH significant for CKD stage III, hypertension, and seizure disorder who presented to ER for evaluation of altered mental status after being found in bed after neighbors noticed that he had not walked his dog. Daughters went to patient's house and found him lying in the bed with his legs hanging off of the bed.   In the ER, he was noted to be febrile to 102.9.  White count was 4.2. EKG showed sinus rhythm. Troponin mildly elevated to 0.1. BUN 38 and Cr 1.53 which were above his baseline. CXR showed no acute process and head CT negative. Influenza was negative. Patient was admitted for further evaluation and treatment.     Hospital Course   Kofi Haddad was admitted on 11/15/2019.  The following problems were addressed during his hospitalization:    Community-acquired pneumonia  Fever  Metabolic encephalopathy  86-year-old with confusion and increased BUN. CURB-65 score is 3, which indicates high risk of death (15-40%).   Clinical picture was not clear for pneumonia on admission. Patient presented with fever, confusion, respiratory symptoms, and hypoxia. He did not have elevated WBC (dilantin may cause pancytopenia), no tachycardia (h/o bradycardia and heart block likely preclude this), and CXR did not note acute cardiopulmonary process. Respiratory virus panel is pending. Flu testing negative. UA pending collection. Blood cultures collected and are  pending.  CT scan this morning is suggestive of infectious process and in setting of fever and symptoms, feel community-acquired pneumonia is appropriate diagnosis.   Radiologist did note that repeat chest CT can be considered in 3-6 months for follow-up, though this can be decided outpatient based on goals.   Rocephin and IV azithromycin are ordered to be given now.   Patient is too confused to contribute to discussion at this time, but is stating that he wants to go home. He does have history of AMA discharge.  Would actually keep patient inpatient given clinical status, however, after discussion with patient's daughters, seems that patient's goals are more in line with no longer being hospitalized and they are in agreement with this. Patient has severe aortic stenosis with history of syncope and has declined any surgical repair or TAVR. Family feels he will be more comfortable at home and they accept the increased risk of death if he discharges home at this point in treatment.   Explained that patient may even be eligible for hospice. They wish to continue treatment at home with oral antibiotics and follow with primary care early this week. Discussed the POLST form and discussed that they could fill it out in a way where patient would not have to be hospitalized in the future. They wish to review this further with patient if he has improvement in mental status.  Discharging home with an additional 4 days Augmentin and azithromycin. PRN acetaminophen and cough syrup.  Family understands that there is high risk in this plan, but have already acknowledged patient's high risk of death from cardiac standpoint at baseline.   Discharging patient home is a reasonable plan given patient's previously stated goals and family wishes.  Patient planning to stay with patient 24/7 until no longer recommended.  Home PT/OT and nursing ordered to follow in home. Depending on improvement and follow up in primary care, may be worth  considering hospice referral, as well.     Acute hypoxic respiratory failure, resolved  This was likely secondary to pneumonia, above. Resolved.     HANY superimposed on CKD 3  Increased BUN/Cr in setting of pneumonia. Patient confused. Minimal improvement with IV fluids. Suspect pre-renal secondary to dehydration and encourage family to continue encouraging fluids at home.   Cr only mildly increased and patient remains markedly hypertensive. Not holding lisinopril at this time, but can be reconsidered in outpatient setting.  Otherwise, avoid nephrotoxins and renally dose medications.  Repeat renal function with primary care in coming days.     Elevated troponin, likely type II MI  Suspect this is due to demand in setting of infection and hypoxia.  Denied chest pain or shortness of breath.   Peaked at 0.177.   No further evaluation at this time. Follow with primary care, but suspect no changes in setting of goals. Patient has been resistant to additional medications in the past and will defer to primary care.    Severe aortic stenosis  History of syncope. Patient has declined further operative / TAVR treatment at this time. He has been informed that he likely has less than 1 year to live.   Follow with cardiology as needed.  Discussed with family that it may be worth considering hospice in near future and recommended attending primary care appointment with patient and discussing further.     History of seizure disorder  Continue PTA dilantin regimen, though there is some question to accuracy of patient's compliance at home. Dilantin level is low, but question how much he was taking. He is not currently a reliable historian, however. This should be followed by primary care.     Hypertension  Continue PTA lisinopril at this time, but may be worth holding in outpatient setting depending on repeat labs and goals.     Connie Rodriguez MD FACP  Hospitalist Service  [facility]      Significant Results and  Procedures   None    Pending Results   These results will be followed up by primary care provider.    Unresulted Labs Ordered in the Past 30 Days of this Admission     Date and Time Order Name Status Description    11/16/2019 1028 Procalcitonin In process     11/16/2019 0103 Respiratory Virus Panel by PCR In process     11/15/2019 1959 Blood culture Preliminary     11/15/2019 1956 Blood culture Preliminary           Code Status   DNR / DNI. Family leaning towards avoiding future hospitalizations and plans to discuss POLST further with primary care.        Primary Care Physician   [facility]    Physical Exam   Temp: 99.3  F (37.4  C) Temp src: Oral BP: (!) 174/71 Pulse: 52 Heart Rate: 56 Resp: 18 SpO2: 95 % O2 Device: None (Room air) Oxygen Delivery: 2 LPM  Vitals:    11/16/19 0106   Weight: 65.5 kg (144 lb 8 oz)     Vital Signs with Ranges  Temp:  [96.9  F (36.1  C)-102.9  F (39.4  C)] 99.3  F (37.4  C)  Pulse:  [52-70] 52  Heart Rate:  [56-77] 56  Resp:  [18-20] 18  BP: (140-211)/() 174/71  SpO2:  [89 %-98 %] 95 %  I/O last 3 completed shifts:  In: 1000 [IV Piggyback:1000]  Out: -     Constitutional: Elderly gentleman in no acute distress, seen ambulating in the hallway on room air. Patient is alert, oriented to person, place, and year, but very confused about hospitalization (thinking he has been in hospital for 7-10 days, confusion about medications). Non-toxic. Occasional wet cough noted.   HEENT: NCAT. EOMI. Moist oral mucosa.  Respiratory: Clear to auscultation bilaterally with soft crackles noted throughout on exam. No wheezes.  Cardiovascular: Regular rhythm. Bradycardic. Harsh systolic murmur.  GI: Soft, nontender, nondistended. Normoactive bowel sounds.   Musculoskeletal: No gross deformities. No peripheral edema.  Neurologic: Alert. Orientation as above. Suspect that patient is not at his baseline, but there are no focal neurologic deficits.     Discharge Disposition   Discharged to  home with 24/7 supervision with family.  Condition at discharge: Guarded    Consultations This Hospital Stay   PHYSICAL THERAPY ADULT IP CONSULT  OCCUPATIONAL THERAPY ADULT IP CONSULT  SOCIAL WORK IP CONSULT    Time Spent on this Encounter   I, Connie Rodriguez MD, personally saw the patient today and spent greater than 30 minutes discharging this patient.    Discharge Orders      Home Care PT Referral for Hospital Discharge      Home Care OT Referral for Hospital Discharge      Home care nursing referral      Reason for your hospital stay    Pneumonia with acute kidney injury. Staying in the hospital longer would be recommended, but based on goals of care, discharging home with 24/7 family supervision. Family to discuss POLST form with primary care and patient once patient returns to baseline mental status.     Activity    Your activity upon discharge: activity as tolerated with 24/7 supervision     MD face to face encounter    Documentation of Face to Face and Certification for Home Health Services    I certify that patient: Kofi Haddad is under my care and that I, or a nurse practitioner or physician's assistant working with me, had a face-to-face encounter that meets the physician face-to-face encounter requirements with this patient on: 11/16/2019.    This encounter with the patient was in whole, or in part, for the following medical condition, which is the primary reason for home health care: Community-acquired pneumonia with confusion in patient not wanting to remain hospitalized.    I certify that, based on my findings, the following services are medically necessary home health services: Nursing, Occupational Therapy and Physical Therapy.    My clinical findings support the need for the above services because: Nurse is needed: To provide assessment and oversight required in the home to assure adherence to the medical plan due to current cognitive decline. Occupational Therapy Services are needed to  assess and treat cognitive ability and address ADL safety.  Possible that patient will return to baseline with continued treatment.  Physical Therapy Services are needed to assess and treat the following functional impairments: gait instability and deconditioning.    Further, I certify that my clinical findings support that this patient is homebound (i.e. absences from home require considerable and taxing effort and are for medical reasons or Religion services or infrequently or of short duration when for other reasons) because: Leaving home is medically contraindicated at this time given degree of confusion and requirement for 24/7 supervision.  Based on the above findings. I certify that this patient is confined to the home and needs intermittent skilled nursing care, physical therapy and/or speech therapy.  The patient is under my care, and I have initiated the establishment of the plan of care.  This patient will be followed by a physician who will periodically review the plan of care.  Physician/Provider to provide follow up care: [facility], [place]    [facility] [place] physician (the Medicare certified [place] provider): Connie Rodriguez MD  Physician Signature: See electronic signature associated with these discharge orders.  Date: 11/16/2019     Follow-up and recommended labs and tests     Follow up with primary care provider, [facility], within 4 days for hospital follow-up, regarding new diagnosis, and to discuss POLST form and consideration of avoiding future hospitalizations (based on conversation with family and patient's previous AMA discharges).  The following labs/tests are recommended: CBC, BMP, dilantin level. Radiologist did note that repeat chest CT can be considered in 3-6 months for follow-up, though this can be decided outpatient based on goals.     Discharge Instructions    Do not drive again until 24/7 supervision no longer recommended from PT/OT.     DNR/DNI     Diet     Follow this diet upon discharge: Regular diet     Discharge Medications   Current Discharge Medication List      START taking these medications    Details   acetaminophen (TYLENOL) 325 MG tablet Take 1-2 tablets (325-650 mg) by mouth every 4 hours as needed for mild pain  Qty: 50 tablet, Refills: 0    Associated Diagnoses: Community acquired pneumonia, bilateral      amoxicillin-clavulanate (AUGMENTIN) 875-125 MG tablet Take 1 tablet by mouth 2 times daily with first dose Sunday, 11/17 morning.  Qty: 8 tablet, Refills: 0    Associated Diagnoses: Community acquired pneumonia, bilateral      azithromycin (ZITHROMAX) 250 MG tablet Take 1 tablet (250 mg) by mouth daily for 4 days with first dose Sunday, 11/17 morning.  Qty: 4 tablet, Refills: 0    Associated Diagnoses: Community acquired pneumonia, bilateral      guaiFENesin (ROBITUSSIN) 20 mg/mL SOLN solution Take 10 mLs by mouth every 4 hours as needed for cough  Qty: 118 mL, Refills: 0    Associated Diagnoses: Community acquired pneumonia, bilateral         CONTINUE these medications which have NOT CHANGED    Details   calcium carbonate (TUMS) 500 MG chewable tablet Take 1 chew tab by mouth daily as needed for heartburn      lisinopril (PRINIVIL/ZESTRIL) 10 MG tablet Take 10 mg by mouth every morning       phenytoin (DILANTIN) 100 MG CR capsule Take 100 mg by mouth 3 times daily            Allergies   No Known Allergies  Data   Most Recent 3 CBC's:  Recent Labs   Lab Test 11/16/19  0606 11/15/19  1950 07/01/19  1406   WBC 4.1 4.2 6.0   HGB 11.8* 13.6 12.5*   MCV 98 96 96   PLT 88* 120* 85*      Most Recent 3 BMP's:  Recent Labs   Lab Test 11/16/19  0606 11/15/19  1950 07/01/19  1406    136 137   POTASSIUM 4.7 4.5 5.5*   CHLORIDE 110* 106 107   CO2 24 24 25   BUN 39* 38* 28   CR 1.49* 1.53* 1.22   ANIONGAP 5 6 5   BRUCE 8.0* 8.5 8.7   GLC 88 116* 104*     Most Recent 2 LFT's:  Recent Labs   Lab Test 11/15/19  1950 10/01/18  0614   AST 42 23   ALT 19 16    ALKPHOS 108 107   BILITOTAL 0.4 0.3     Most Recent INR's and Anticoagulation Dosing History:  Anticoagulation Dose History     Recent Dosing and Labs Latest Ref Rng & Units 4/3/2006    INR 0.86 - 1.14 1.13        Most Recent 3 Troponin's:  Recent Labs   Lab Test 11/16/19  0606 11/16/19  0146 11/15/19  1950   TROPI 0.161* 0.177* 0.100*     Most Recent Cholesterol Panel:  Recent Labs   Lab Test 10/01/18  0614   CHOL 149   LDL 72   HDL 44   TRIG 165*     Most Recent 6 Bacteria Isolates From Any Culture (See EPIC Reports for Culture Details):  Recent Labs   Lab Test 11/15/19  2012 11/15/19  1950   CULT No growth after 8 hours No growth after 8 hours     Most Recent TSH, T4 and A1c Labs:No lab results found.  Results for orders placed or performed during the hospital encounter of 11/15/19   Chest XR,  PA & LAT    Narrative    EXAM: XR CHEST 2 VW  LOCATION: [facility]  DATE/TIME: 11/15/2019 8:28 PM    INDICATION: Altered mental status, febrile  COMPARISON: 07/01/2019      Impression    IMPRESSION: Normal heart size. Prominent opacity in the right paratracheal region similar to prior exam and is seen as vasculature on prior chest CT. No pneumothorax or pleural effusion. Ununited left posterior seventh rib fracture. Lungs are clear.   Head CT w/o contrast    Narrative    EXAM: CT HEAD W/O CONTRAST  LOCATION: [facility]  DATE/TIME: 11/15/2019 8:20 PM    INDICATION: Confusion  COMPARISON: CT head 04/03/2006.  TECHNIQUE: Routine without IV contrast. Multiplanar reformats. Dose reduction techniques were used.    FINDINGS:  INTRACRANIAL CONTENTS: No intracranial hemorrhage, extraaxial collection, or mass effect.  No CT evidence of acute infarct. Mild presumed chronic small vessel ischemic changes. Moderate generalized volume loss. No hydrocephalus.     VISUALIZED ORBITS/SINUSES/MASTOIDS: No intraorbital abnormality. Mild mucosal thickening scattered about the paranasal sinuses. No middle ear or  mastoid effusion. Debris in the external auditory canals.    BONES/SOFT TISSUES: No acute abnormality.      Impression    IMPRESSION:  1.  No acute intracranial pathology.  2.  Increased mild chronic small vessel ischemic disease and moderate generalized parenchymal volume loss since 04/03/2006.   CT Chest w/o Contrast    Narrative    CT CHEST WITHOUT CONTRAST November 16, 2019 9:55 AM    HISTORY: Hypoxia, fever, evaluate for infiltrate.    TECHNIQUE: Scans obtained from the apices through the diaphragm  without IV contrast. Radiation dose for this scan was reduced using  automated exposure control, adjustment of the mA and/or kV according  to patient size, or iterative reconstruction technique.    COMPARISON: CT chest 9/29/2011.    FINDINGS:    Lungs: There are multiple regions of micronodular new opacity seen  within the bilateral lungs. This is dominantly identified at the lower  lobes, but other lobes are also involved. For example, one of the  nodular opacities at the left lower lobe measures 1 cm series 5 image  161. An example focal region within the posterior right lower lobe is  0.9 cm series 5 image 140. There are other examples as well with  adjacent multiple satellite nodules.    Additional findings: No acute mediastinal abnormality within the  limits of unenhanced scanning. Diffuse thoracic aortic and coronary  artery calcifications. Small hiatal hernia again identified. Multiple  granulomatous calcified lymph nodes. Upper abdomen images shows severe  atrophy of the left kidney progressed since 2011.      Impression    IMPRESSION:  1. Multifocal bilateral clustered nodular airspace disease newly  identified. The morphology suggests an infectious or inflammatory  cause. Neoplastic etiology is not entirely excluded. Recommend  follow-up CT chest in three to six months for surveillance.  2. Diffuse thoracic aortic and coronary artery calcifications.  3. Severe left renal atrophy.    TANI SHAY MD

## 2019-11-16 NOTE — PROGRESS NOTES
11/16/19 0834   Quick Adds   Type of Visit Initial Occupational Therapy Evaluation   Living Environment   Lives With alone   Living Arrangements house   Home Accessibility stairs to enter home;stairs within home   Number of Stairs, Main Entrance 7   Number of Stairs, Within Home, Primary 10   Stair Railings, Within Home, Primary railings on both sides of stairs   Transportation Anticipated car, drives self;family or friend will provide   Living Environment Comment lives alone with his dog, enters through tuck under garage and has a flight of stairs up to main level. has tub shower   Home Main Entrance   Surface of Stairs, Main Entrance concrete   Landing, Stairs, Main Entrance adequate turning radius   Self-Care   Usual Activity Tolerance good   Current Activity Tolerance moderate   Regular Exercise No   General Information   Onset of Illness/Injury or Date of Surgery - Date 11/15/19   Referring Physician Devendra Werner   Patient/Family Goals Statement to go home   Additional Occupational Profile Info/Pertinent History of Current Problem pt adm with AMS, fevers and weakness   Precautions/Limitations fall precautions   General Observations pt sleeping, woke and agreeable to OT eval.    General Info Comments daughters present for most of eval.    Cognitive Status Examination   Orientation person;time   Level of Consciousness alert;confused   Follows Commands (Cognition) repetition of directions required   Memory impaired   Cognitive Comment family states he is usually pretty good, but since being sick, more confusion   Visual Perception   Visual Perception Wears glasses   Sensory Examination   Sensory Quick Adds No deficits were identified   Pain Assessment   Patient Currently in Pain No   Integumentary/Edema   Integumentary/Edema no deficits were identifed   Posture   Posture forward head position   Range of Motion (ROM)   ROM Quick Adds No deficits were identified   Strength   Manual Muscle Testing Quick  "Adds No deficits were identified   Strength Comments overall 4+/5   Muscle Tone Assessment   Muscle Tone Quick Adds No deficits were identified   Coordination   Upper Extremity Coordination No deficits were identified   Transfer Skill: Bed to Chair/Chair to Bed   Level of [place]: Bed to Chair contact guard   Physical Assist/Nonphysical Assist: Bed to Chair 1 person assist   Assistive Device - Transfer Skill Bed to Chair Chair to Bed Rehab Eval rolling walker   Transfer Skill: Sit to Stand   Level of [place]: Sit/Stand stand-by assist   Toilet Transfer   Toilet Transfer Comments LOB while trying to manage clothes. needed min A to correct   Transfer Skill: Toilet Transfer   Level of [place]: Toilet stand-by assist   Physical Assist/Nonphysical Assist: Toilet 1 person assist   Upper Body Dressing   Level of [place]: Dress Upper Body stand-by assist   Lower Body Dressing   Level of [place]: Dress Lower Body stand-by assist   Toileting   Level of [place]: Toilet minimum assist (75% patients effort)   Assistive Device rolling walker;grab bars   Grooming   Level of [place]: Grooming stand-by assist   Eating/Self Feeding   Level of [place]: Eating independent   Instrumental Activities of Daily Living (IADL)   Previous Responsibilities meal prep;housekeeping;laundry;shopping;yardwork;medication management;finances;driving   Activities of Daily Living Analysis   Impairments Contributing to Impaired Activities of Daily Living balance impaired;cognition impaired  (activity tolerance)   General Therapy Interventions   Planned Therapy Interventions ADL retraining;balance training;transfer training;progressive activity/exercise   Clinical Impression   Criteria for Skilled Therapeutic Interventions Met yes, treatment indicated   OT Diagnosis decreased I with ADL\"s and functional mobility   Influenced by the following impairments decreased cognition and activity tolerance   Assessment of " "Occupational Performance 1-3 Performance Deficits   Identified Performance Deficits decreased toilet transfer, home mgmt   Clinical Decision Making (Complexity) Low complexity   Therapy Frequency Daily   Predicted Duration of Therapy Intervention (days/wks) 2 days   Anticipated Discharge Disposition Home with Home Therapy   Risks and Benefits of Treatment have been explained. Yes   Patient, Family & other staff in agreement with plan of care Yes   [facility] TM \"6 Clicks\"   2016, Trustees of [facility], under license to [employer].  All rights reserved.   6 Clicks Short Forms Daily Activity Inpatient Short Form   [facility]  \"6 Clicks\" Daily Activity Inpatient Short Form   1. Putting on and taking off regular lower body clothing? 3 - A Little   2. Bathing (including washing, rinsing, drying)? 3 - A Little   3. Toileting, which includes using toilet, bedpan or urinal? 3 - A Little   4. Putting on and taking off regular upper body clothing? 4 - None   5. Taking care of personal grooming such as brushing teeth? 3 - A Little   6. Eating meals? 4 - None   Daily Activity Raw Score (Score out of 24.Lower scores equate to lower levels of function) 20   Total Evaluation Time   Total Evaluation Time (Minutes) 10     "

## 2019-11-16 NOTE — PLAN OF CARE
"PRIMARY DIAGNOSIS: PNEUMONIA  OUTPATIENT/OBSERVATION GOALS TO BE MET BEFORE DISCHARGE:  1. Dyspnea improved and O2 sats >88% on RA or back to baseline O2 levels: Yes   SpO2: 95 %, O2 Device: None (Room air)    2. Tolerating oral abx or appropriate plans made outpatient infusion: IV rocephin and zithromax given.    3. Vitals signs normal or return to baseline: hypertensive, sched lisinopril given    4. Short term supplemental O2 needed with activity at home: No    5. Tolerate oral intake to maintain hydration: Yes    6. Return to near baseline physical activity: x1    Discharge Planner Nurse   Safe discharge environment identified: Yes  Barriers to discharge: No       Entered by: Teresa Marquez 11/16/2019 1:59 PM     Please review provider order for any additional goals.   Nurse to notify provider when observation goals have been met and patient is ready for discharge.    Pt in and out of orientation, per pt's family, pt has days like this, forgetful. Pt hypertensive, low grade temp. Tele shows SR w/BBB, prolonged QT. LS crackles on RA, congested cough, denies SOB. Active BS, denies nausea. Voiding w/o difficulty. On reg diet. UA, CT chest complete this shift. IV rocephin and zithromax complete. Will discharge w/augmentin and zithromax. Daughters will stay w/pt. Denies pain.   BP (!) 165/66 (BP Location: Left arm)   Pulse 52   Temp 99.3  F (37.4  C) (Oral)   Resp 18   Ht 1.651 m (5' 5\")   Wt 65.5 kg (144 lb 8 oz)   SpO2 95%   BMI 24.05 kg/m      "

## 2019-11-16 NOTE — PHARMACY-ADMISSION MEDICATION HISTORY
Admission medication history interview status for this patient is complete. See [facility] admission navigator for allergy information, prior to admission medications and immunization status.     Medication history interview source(s):Patient  Medication history resources (including written lists, pill bottles, clinic record): SureScripts and Care Everywhere  Primary pharmacy: [id number] [place], [place] - [address]     Changes made to PTA medication list:  Added: None  Deleted: Atorvastatin and ranitidine  Changed: Phenytoin 100mg TID --> 100mg every day per patient    Actions taken by pharmacist (provider contacted, etc):None     Additional medication history information: Phenytoin prescription is 100mg TID, patient is taking differently at home.    Medication reconciliation/reorder completed by provider prior to medication history?  No     Do you take OTC medications (eg tylenol, ibuprofen, fish oil, eye/ear drops, etc)? Yes     Prior to Admission medications    Medication Sig Last Dose Taking? Auth Provider   calcium carbonate (TUMS) 500 MG chewable tablet Take 1 chew tab by mouth daily as needed for heartburn Past Week at Unknown time Yes Unknown, Entered By History   lisinopril (PRINIVIL/ZESTRIL) 10 MG tablet Take 10 mg by mouth every morning  11/15/2019 at am Yes Reported, Patient   phenytoin (DILANTIN) 100 MG CR capsule Take 100 mg by mouth daily  11/15/2019 at Unknown time Yes Reported, Patient         11/16 - Changed phenytoin entry to 100mg TID because that is what is prescribed, but patient said he takes 100mg daily during med rec and later told RN/MD that he takes 200mg daily.  MD states that patient is still confused so his medication history may not be reliable.

## 2019-11-16 NOTE — PROGRESS NOTES
SW met with patient and daughter to discuss discharge planning. Explained to Genie the daughter that patient is needing supervision and can't drive at this time. She said her and her sister understood and are planning on staying with him the next few days. SW also provided resources [facility], [facility] and private duty nursing.     Discussed with patient the need for home care services after discharge. The patient was given a list of home care agencies. Patient has chosen [facility]. The criteria for homebound status was explained as applicable. Home care was described to the patient as skilled, intermittent service. Length and frequency of home care visits will be determined by the home care agency.       Home care to call Genie at [phone] to set up home care.       HOLLAND Haley, [facility]  [phone]

## 2019-11-16 NOTE — PLAN OF CARE
"Patient's After Visit Summary was reviewed with patient and/or daughter.   Patient verbalized understanding of After Visit Summary, recommended follow up and was given an opportunity to ask questions.   Discharge medications sent home with patient/family: YES, tylenol, robitussin, augmentin, zithromax  Discharged with daughter.    Discussed AVS with daughterGenie. Reminded to make appt w/PCP w/in 4 days. Reminded Genie that pt should not drive w/this confusion. Belongings taken with. Genie and Cheli (daughters) will stay w/pt until PT/OT says pt is ok. Pt discharged back home with daughter Genie, Genie will be driving pt home.  POLST information given to pt's daughter cheli.     /69 (BP Location: Left arm)   Pulse 52   Temp 97.9  F (36.6  C) (Oral)   Resp 18   Ht 1.651 m (5' 5\")   Wt 65.5 kg (144 lb 8 oz)   SpO2 95%   BMI 24.05 kg/m      OBSERVATION patient END time: 1510    "

## 2019-11-16 NOTE — ED NOTES
[facility]  ED Nurse Handoff Report    Kofi Haddad is a 86 year old male   ED Chief complaint: Altered Mental Status  . ED Diagnosis:   Final diagnoses:   Fever due to infection   Influenza-like illness   Elevated troponin   HANY (acute kidney injury) (H)     Allergies: No Known Allergies    Code Status: Full Code  Activity level - Baseline/Home:  Independent. Activity Level - Current:   Total Care. Lift room needed: Yes. Bariatric: No   Needed: No   Isolation: No. Infection: Not Applicable.     Vital Signs:   Vitals:    11/15/19 2200 11/15/19 2215 11/15/19 2230 11/15/19 2245   BP:  (!) 158/63  (!) 145/65   Pulse:       Resp:       Temp:       TempSrc:       SpO2: 93% 91% (!) 89% 94%       Cardiac Rhythm:  ,      Pain level:    Patient confused: Yes. Patient Falls Risk: Yes.   Elimination Status: Unable to void - Bladder scan completed, 9ml urine present   Patient Report - Initial Complaint: Patient presents to ED via EMS from home. Per EMS report patients neighbors concerned for well being due to not seeing him all day, daughters called and patient found lying in bed for unknown amount of time. Very weak, unable to sit up per EMS.     Family concerned due to possible slurred speech and possible facial droop.     On arrival patient is alert to self, , and place.      . Focused Assessment: Cognitive - Cognitive/Neuro/Behavioral WDL: WDL   Clarkia Coma Scale - Best Eye Response: 4-->(E4) spontaneous  Best Motor Response: 6-->(M6) obeys commands  Best Verbal Response: 4-->(V4) confused  Clarkia Coma Scale Score: 14   Tests Performed: Lab, CT, CXR. Abnormal Results:   Labs Ordered and Resulted from Time of ED Arrival Up to the Time of Departure from the ED   CBC WITH PLATELETS DIFFERENTIAL - Abnormal; Notable for the following components:       Result Value    RBC Count 4.27 (*)     Platelet Count 120 (*)     All other components within normal limits   COMPREHENSIVE METABOLIC PANEL -  Abnormal; Notable for the following components:    Glucose 116 (*)     Urea Nitrogen 38 (*)     Creatinine 1.53 (*)     GFR Estimate 40 (*)     GFR Estimate If Black 47 (*)     All other components within normal limits   TROPONIN I - Abnormal; Notable for the following components:    Troponin I ES 0.100 (*)     All other components within normal limits   CK TOTAL   ROUTINE UA WITH MICROSCOPIC   ISTAT CG4 GASES LACTATE FABRICIO NURSING POCT   ISTAT  GASES LACTATE FABRICIO POCT   BLOOD CULTURE   BLOOD CULTURE   INFLUENZA A/B ANTIGEN     Chest XR,  PA & LAT   Final Result   IMPRESSION: Normal heart size. Prominent opacity in the right paratracheal region similar to prior exam and is seen as vasculature on prior chest CT. No pneumothorax or pleural effusion. Ununited left posterior seventh rib fracture. Lungs are clear.      Head CT w/o contrast   Final Result   IMPRESSION:   1.  No acute intracranial pathology.   2.  Increased mild chronic small vessel ischemic disease and moderate generalized parenchymal volume loss since 04/03/2006.        .   Treatments provided: See MAR  Family Comments: Multiple family present  OBS brochure/video discussed/provided to patient:  N/A  ED Medications:   Medications   0.9% sodium chloride BOLUS (0 mLs Intravenous Stopped 11/15/19 2132)   acetaminophen (TYLENOL) tablet 1,000 mg (1,000 mg Oral Given 11/15/19 2011)   sodium chloride 0.9% infusion ( Intravenous ED Infusing on Admission/transfer 11/15/19 2309)     Drips infusing:  No  For the majority of the shift, the patient's behavior Green. Interventions performed were none.     Severe Sepsis OR Septic Shock Diagnosis Present: No      ED Nurse Name/Phone Number: Amarjit Arreaga RN,   11:17 PM  RECEIVING UNIT ED HANDOFF REVIEW    Above ED Nurse Handoff Report was reviewed: Yes  Reviewed by: Arlette Morgan RN on November 16, 2019 at 12:44 AM

## 2019-11-16 NOTE — ED PROVIDER NOTES
"  History     Chief Complaint:  Altered mental status      HPI   Kofi Haddad is a 86 year old male with a history of stage 3 chronic kidney disease an untreated right bundle branch block who presents with decreased level of activity via EMS. His daughters report that the patient's neighbors noticed that he hadn't walked his dogs today and called his daughters. His daughters report that they checked up on him, found him laying in bed and acting differently, and called 911. They report that he had been in bed for at least 3 hours. They report that he saw a cardiologist one year ago and was told that he has a severe heart block. They report that he was offered surgery, which he refused. They report that he was told that he had a year to live at this visit one year ago. In the emergency department, the patient reports pain with movement in \"whatever part is moving\". He denies fever, chest pain, shortness of breath, cough, cold, or runny nose    Allergies:  No known drug allergies     Medications:    Dilantin  Norvasc  Prinivil    Past Medical History:    Aortic stenosis  HTN  CKD, stage 3  GERD  Seizure  HLD  Basal cell carcinoma  Prostate cancer    Past Surgical History:    Orchiectomy    Family History:    Arthritis    Social History:  Smoking status: Former  Alcohol use: Rarely    Marital Status:       Review of Systems   Constitutional: Positive for activity change. Negative for fever.   HENT: Negative for rhinorrhea.    Respiratory: Negative for cough and shortness of breath.    Cardiovascular: Negative for chest pain.   Musculoskeletal: Positive for myalgias.   All other systems reviewed and are negative.      Physical Exam     Patient Vitals for the past 24 hrs:   BP Temp Temp src Pulse Heart Rate Resp SpO2   11/15/19 2245 (!) 145/65 -- -- -- -- -- 94 %   11/15/19 2230 -- -- -- -- -- -- (!) 89 %   11/15/19 2215 (!) 158/63 -- -- -- -- -- 91 %   11/15/19 2200 -- -- -- -- -- -- 93 %   11/15/19 2145 (!) " 158/78 -- -- -- -- -- 92 %   11/15/19 2131 (!) 149/97 -- -- -- -- -- 93 %   11/15/19 2130 (!) 149/97 -- -- 70 -- -- 94 %   11/15/19 2129 -- -- -- -- -- -- 97 %   11/15/19 1956 -- 102.9  F (39.4  C) Rectal -- -- -- --   11/15/19 1953 -- 99.7  F (37.6  C) Oral -- -- -- --   11/15/19 1948 (!) 140/106 -- -- -- 77 20 94 %       Physical Exam  Nursing note and vitals reviewed.  Constitutional: Cooperative.   HENT:   Mouth/Throat: dry mucous membranes.   Eyes: EOMI, nonicteric sclera  Cardiovascular: Normal rate, regular rhythm, no murmurs, rubs, or gallops  Pulmonary/Chest: Effort normal and breath sounds normal. No respiratory distress. No wheezes. No rales.   Abdominal: Soft. Nontender, nondistended, no guarding or rigidity. BS present.   Musculoskeletal: Normal range of motion.   Neurological: Alert. Oriented x1.  Moves all extremities spontaneously with equal BUE/BLE strength. Face symmetric. Speech fluent without dysarthria.   Skin: Skin is warm and dry. No rash noted.     Emergency Department Course   ECG:  Indication: Altered mental status  Time: 1944  Vent. Rate 76 bpm. GA interval 138. QRS duration 118. QT/QTc 388/436. P-R-T axis 18 -29 -2. Normal sinus rhythm. Right bundle branch block. Abnormal ECG. Read time: 1957    Imaging:  Radiographic findings were communicated with the patient who voiced understanding of the findings.  Imaging independently reviewed and agree with radiologist interpretation.     CT Head without contrast:   1.  No acute intracranial pathology.  2.  Increased mild chronic small vessel ischemic disease and moderate generalized parenchymal volume loss since 04/03/2006.    Read as per radiology.    XR Chest PA and LAT:   Normal heart size. Prominent opacity in the right paratracheal region similar to prior exam and is seen as vasculature on prior chest CT. No pneumothorax or pleural effusion. Ununited left posterior seventh rib fracture. Lungs are clear.    Read as per  radiology.    Laboratory:  Influenza A/B antigen: A:Negative  B:Negative     ISTAT VBG: pH 7.36 / PCO2 41 / PO2 25 / Bicarb 23 / O2 sat 42 / lactic acid 1.2    CBC: WBC: 4.2, HGB: 13.6, PLT: 120 (L)    CMP: Glucose 116 (H), urea nitrogen: 38 (H), GFR estimate: 40 (L), o/w WNL (Creatinine: 1.53 (H))    CK total: 410 (H)    1950 Troponin: 0.100 (H)    UA with micro: pending at time of admission.    Blood culture x2: pending    Interventions:  2001: NS 1L IV Bolus   2011: Tylenol 1,000 mg PO    Emergency Department Course:  Nursing notes and vitals reviewed. (1945) I performed an exam of the patient as documented above.     IV inserted. Medicine administered as documented above. Blood drawn. This was sent to the lab for further testing, results above.    The patient was sent for a head CT while in the emergency department, findings above.     2227 I rechecked the patient and discussed the results of his workup thus far.     Dr. Werner of the hospitalist services evaluated the patient in the ED. They are in agreement to accept the patient for admission.    Findings and plan explained to the Patient who consents to admission. Dr. Werner evaluated the patient in the ED and will admit the patient to an ob bed for further monitoring, evaluation, and treatment.    Impression & Plan    Medical Decision Making:  Patient presents from home with altered mental status.  He is oriented x1 and in fact told me he thought it was Tuesday in April.  This is different than his usual mental status according to his daughters who are at bedside.  He is found to be febrile on initial evaluation which patient had actually denied any fevers.  This is most likely the etiology for his complaints.  Uncertain of the source, though suspect upper respiratory infection given some hypoxia as well.  Chest x-ray is negative for pneumonia.  No leukocytosis noted.  Patient does have a mild HANY.  His troponin is also mildly elevated which is most  likely demand ischemia related to his fever and dehydration.  Rapid flu also negative.  Given patient's altered mental status, dehydration, fever, and troponin elevation, admission recommended which patient and family were in agreement with.  Dr. Werner accepts for admission.    Diagnosis:    ICD-10-CM   1. Fever due to infection R50.81    B99.9   2. Influenza-like illness R69   3. Elevated troponin R79.89   4. HANY (acute kidney injury) (H) N17.9       Disposition:  Admitted to Wanda Ball  11/15/2019   [facility] EMERGENCY DEPARTMENT  Scribe Disclosure:  I, Eliot Ball, am serving as a scribe on 11/15/2019 at 7:45 PM to personally document services performed by Jurgen Torres MD based on my observations and the provider's statements to me.      Jurgen Torres MD  11/16/19 0528

## 2019-11-16 NOTE — PLAN OF CARE
"Discharge Planner PT   Patient plan for discharge: Home with family   Current status: Orders received and chart reviewed. Per chart review, patient admitted on 11/15/19 for fever and alteration in mental status. Per OT note, \"Pt lives alone, has 2 daughters that call to check in on him. Pt drives into garage and has a full flight of stairs up to main level. He was ind with ADL's and IADL's including meds and finances.\" Patient seen this AM by OT who recommended return home with family and HH PT as well as encouraged use of FWW, which patient declined. Attempted to see patient this PM to assess progressed mobility and use of FWW. Patient declining working with PT, states he will be fine at home without FWW and not interested in working with therapy during hospital stay. Educated on risk of falls and patient continues to decline. Will complete IP PT orders at this time. RN aware.    Barriers to return to prior living situation: Per OT evaluation- decreased balance, cognition and weakness  Recommendations for discharge: Per OT, recommended TCU however patient declining, agree with rec for 24 hour supervision due to decreased balance and some decreased cognition and weakness.   Rationale for recommendations: Patient declining working with PT during IP stay, agree with OT recommendations for TCU however patient declining; therefore agree with 24 hour supervision at home due to decreased balance and some decreased cognition and weakness. Encourage use of FWW for all mobility/ambulation as patient at increased risk of falls. Patient would benefit from HH PT to further address functional limitations and decreased overall risk of falls.        Entered by: Miranda Ferguson 11/16/2019 12:08 PM      "

## 2019-11-16 NOTE — PROGRESS NOTES
ROOM # 215    Living Situation: Independent at home  : Genie (daughter) [phone]    Activity level at baseline: independent, lives alone  Activity level on admit: assist x1      Patient registered to observation; given Patient Bill of Rights; given the opportunity to ask questions about observation status and their plan of care.  Patient has been oriented to the observation room, bathroom and call light is in place.    Discussed discharge goals and expectations with patient/family.

## 2019-11-16 NOTE — ED NOTES
"Hospitalist at bedside, SBAR report given. MD advised that patient has not voided yet, bladder scan done and 9mL measured. MD advised that pt is \"very dry\". No interventions prior to admit.   "

## 2019-11-16 NOTE — DISCHARGE INSTRUCTIONS
The  has arranged home care,  for you after you return home from the hospital.   The Home Care Agency Arranged is [facility]  The telephone number is [phone]  You will be contacted by phone within a few days after your hospital stay by the Home Care Agency to set up your first visit.     If you have not heard from the Home Care Agency within 1-2  Days after discharge from the hospital, please contact the number provided above.

## 2019-11-17 LAB
FLUAV H1 2009 PAND RNA SPEC QL NAA+PROBE: NEGATIVE
FLUAV H1 RNA SPEC QL NAA+PROBE: NEGATIVE
FLUAV H3 RNA SPEC QL NAA+PROBE: NEGATIVE
FLUAV RNA SPEC QL NAA+PROBE: NEGATIVE
FLUBV RNA SPEC QL NAA+PROBE: NEGATIVE
HADV DNA SPEC QL NAA+PROBE: NEGATIVE
HADV DNA SPEC QL NAA+PROBE: NEGATIVE
HMPV RNA SPEC QL NAA+PROBE: POSITIVE
HPIV1 RNA SPEC QL NAA+PROBE: NEGATIVE
HPIV2 RNA SPEC QL NAA+PROBE: NEGATIVE
HPIV3 RNA SPEC QL NAA+PROBE: NEGATIVE
MICROBIOLOGIST REVIEW: ABNORMAL
RHINOVIRUS RNA SPEC QL NAA+PROBE: NEGATIVE
RSV RNA SPEC QL NAA+PROBE: NEGATIVE
RSV RNA SPEC QL NAA+PROBE: NEGATIVE
SPECIMEN SOURCE: ABNORMAL

## 2019-11-17 NOTE — PLAN OF CARE
Occupational Therapy Discharge Summary    Reason for therapy discharge:    Discharged to home.    Progress towards therapy goal(s). See goals on Care Plan in [facility] electronic health record for goal details.  Goals not met.  Barriers to achieving goals:   discharge from facility.    Therapy recommendation(s):    Continued therapy is recommended.  Rationale/Recommendations:  Pt not seen by this therapist, recommendations obtained from chart: pt refuses TCU, rec 24 hour supervision due to decreased balance and some decreased cognition and weakness.

## 2019-11-18 LAB — INTERPRETATION ECG - MUSE: NORMAL

## 2019-11-21 LAB
BACTERIA SPEC CULT: NO GROWTH
BACTERIA SPEC CULT: NO GROWTH
Lab: NORMAL
Lab: NORMAL
SPECIMEN SOURCE: NORMAL
SPECIMEN SOURCE: NORMAL

## 2019-11-22 NOTE — UTILIZATION REVIEW
Admission Status; Secondary Review Determination       As part of the [facility] Utilization review plan, a self-audit is done on Medicare inpatient admission with less than 2 midnights stay. The 2014 IPPS Final Rule allows outpatient billing in the event that a hospital determines that an inpatient admission was not medically necessary under utilization review process.          (x) Outpatient status would be Appropriate- Short Stay- Post discharge review.     RATIONALE FOR DETERMINATION   86 yr old presented to ED on 11/15 with fever of 102.9.  Room air sat was 83%. He was started on IV abx, oxygen and registered to Observation.  On 11/16 Chest CT showed fernando nodular airspace disease possible infectious etiology and he was changed to Inpatient for ongoing care.Per discharge surmmary:  Would actually keep patient inpatient given clinical status, however, after discussion with patient's daughters, seems that patient's goals are more in line with no longer being hospitalized and they are in agreement with this.  Patient was then discharged. He was an inpatient for only for 3.5 hours.  No documentation of leaving AMA.  Obs order Devendra Werner MD 11/15/19 2322 Fever.  IP order Connie Beltran MD 11/16/19 1042 > or = 2 midnights Community acquired pneumonia, bilateral    This account and medical record number for a Medicare beneficiary was an inpatient short stay (<2 midnights) and didn't meet medical necessity for inpatient admission. We recommend billing outpatient services based on the severity of illness, intensity of service provided and the inpatient length of stay.  Please contact me within one week of receiving this letter only if you disagree with this determination. If you concur, no further action is needed.          The information on this document is developed by the utilization review team in order for the business office to ensure compliance.  This only denotes the appropriateness of  proper admission status and does not reflect the quality of care rendered.         The definitions of Inpatient Status and Observation Status used in making the determination above are those provided in the CMS Coverage Manual, Chapter 1 and Chapter 6, section 70.4.      Sincerely,       RODO CONNER MD    System Medical Director  Utilization  [facility].

## 2021-01-01 ENCOUNTER — HOSPITAL ENCOUNTER (OUTPATIENT)
Facility: CLINIC | Age: 86
Setting detail: OBSERVATION
Discharge: HOME | End: 2021-06-24
Attending: EMERGENCY MEDICINE | Admitting: HOSPITALIST
Payer: MEDICARE

## 2021-01-01 ENCOUNTER — DOCUMENTATION ONLY (OUTPATIENT)
Dept: OTHER | Facility: CLINIC | Age: 86
End: 2021-01-01

## 2021-01-01 ENCOUNTER — ASSISTED LIVING VISIT (OUTPATIENT)
Dept: GERIATRICS | Facility: CLINIC | Age: 86
End: 2021-01-01
Payer: MEDICARE

## 2021-01-01 ENCOUNTER — MEDICAL CORRESPONDENCE (OUTPATIENT)
Dept: HEALTH INFORMATION MANAGEMENT | Facility: CLINIC | Age: 86
End: 2021-01-01

## 2021-01-01 ENCOUNTER — HOSPITAL ENCOUNTER (INPATIENT)
Facility: CLINIC | Age: 86
LOS: 3 days | Discharge: NURSING, REHAB OR LONG-TERM CARE | DRG: 884 | End: 2021-07-12
Attending: EMERGENCY MEDICINE | Admitting: HOSPITALIST
Payer: MEDICARE

## 2021-01-01 ENCOUNTER — APPOINTMENT (OUTPATIENT)
Dept: CT IMAGING | Facility: CLINIC | Age: 86
DRG: 884 | End: 2021-01-01
Attending: EMERGENCY MEDICINE
Payer: MEDICARE

## 2021-01-01 VITALS
OXYGEN SATURATION: 95 % | DIASTOLIC BLOOD PRESSURE: 72 MMHG | SYSTOLIC BLOOD PRESSURE: 131 MMHG | RESPIRATION RATE: 16 BRPM | HEART RATE: 81 BPM

## 2021-01-01 VITALS
SYSTOLIC BLOOD PRESSURE: 144 MMHG | RESPIRATION RATE: 18 BRPM | TEMPERATURE: 97.9 F | HEART RATE: 77 BPM | DIASTOLIC BLOOD PRESSURE: 75 MMHG | WEIGHT: 148.8 LBS | OXYGEN SATURATION: 98 % | BODY MASS INDEX: 23.91 KG/M2 | HEIGHT: 66 IN

## 2021-01-01 VITALS
SYSTOLIC BLOOD PRESSURE: 130 MMHG | BODY MASS INDEX: 23.08 KG/M2 | WEIGHT: 143 LBS | OXYGEN SATURATION: 94 % | DIASTOLIC BLOOD PRESSURE: 69 MMHG | RESPIRATION RATE: 18 BRPM | HEART RATE: 80 BPM

## 2021-01-01 VITALS
SYSTOLIC BLOOD PRESSURE: 139 MMHG | HEART RATE: 50 BPM | OXYGEN SATURATION: 97 % | WEIGHT: 143.2 LBS | BODY MASS INDEX: 23.11 KG/M2 | RESPIRATION RATE: 16 BRPM | TEMPERATURE: 97.6 F | DIASTOLIC BLOOD PRESSURE: 64 MMHG

## 2021-01-01 VITALS
TEMPERATURE: 97.7 F | RESPIRATION RATE: 18 BRPM | HEART RATE: 78 BPM | DIASTOLIC BLOOD PRESSURE: 67 MMHG | SYSTOLIC BLOOD PRESSURE: 127 MMHG | OXYGEN SATURATION: 93 %

## 2021-01-01 VITALS
TEMPERATURE: 98 F | DIASTOLIC BLOOD PRESSURE: 95 MMHG | WEIGHT: 142 LBS | SYSTOLIC BLOOD PRESSURE: 145 MMHG | HEART RATE: 78 BPM | RESPIRATION RATE: 17 BRPM | BODY MASS INDEX: 22.92 KG/M2

## 2021-01-01 DIAGNOSIS — I35.0 SEVERE AORTIC STENOSIS: ICD-10-CM

## 2021-01-01 DIAGNOSIS — R53.1 GENERALIZED WEAKNESS: ICD-10-CM

## 2021-01-01 DIAGNOSIS — I10 UNCONTROLLED HYPERTENSION: ICD-10-CM

## 2021-01-01 DIAGNOSIS — G40.909 SEIZURE DISORDER (H): ICD-10-CM

## 2021-01-01 DIAGNOSIS — Z71.89 ACP (ADVANCE CARE PLANNING): ICD-10-CM

## 2021-01-01 DIAGNOSIS — R29.6 FALLS FREQUENTLY: ICD-10-CM

## 2021-01-01 DIAGNOSIS — Z51.5 HOSPICE CARE PATIENT: ICD-10-CM

## 2021-01-01 DIAGNOSIS — K59.00 CONSTIPATION, UNSPECIFIED CONSTIPATION TYPE: ICD-10-CM

## 2021-01-01 DIAGNOSIS — F03.91 DEMENTIA WITH BEHAVIORAL DISTURBANCE, UNSPECIFIED DEMENTIA TYPE: ICD-10-CM

## 2021-01-01 DIAGNOSIS — G40.909 SEIZURE DISORDER (H): Primary | ICD-10-CM

## 2021-01-01 DIAGNOSIS — R53.83 FATIGUE, UNSPECIFIED TYPE: ICD-10-CM

## 2021-01-01 DIAGNOSIS — K59.00 CONSTIPATION, UNSPECIFIED CONSTIPATION TYPE: Primary | ICD-10-CM

## 2021-01-01 DIAGNOSIS — I10 HYPERTENSION, UNSPECIFIED TYPE: Primary | ICD-10-CM

## 2021-01-01 DIAGNOSIS — R29.6 RECURRENT FALLS: ICD-10-CM

## 2021-01-01 DIAGNOSIS — I10 ESSENTIAL HYPERTENSION: Primary | ICD-10-CM

## 2021-01-01 DIAGNOSIS — N18.32 STAGE 3B CHRONIC KIDNEY DISEASE (H): ICD-10-CM

## 2021-01-01 DIAGNOSIS — R00.1 BRADYCARDIA: ICD-10-CM

## 2021-01-01 DIAGNOSIS — R45.1 AGITATION: Primary | ICD-10-CM

## 2021-01-01 DIAGNOSIS — I35.0 SEVERE AORTIC STENOSIS: Primary | ICD-10-CM

## 2021-01-01 DIAGNOSIS — R55 SYNCOPE AND COLLAPSE: ICD-10-CM

## 2021-01-01 DIAGNOSIS — Z91.148 NONCOMPLIANCE WITH MEDICATION REGIMEN: ICD-10-CM

## 2021-01-01 DIAGNOSIS — I10 HYPERTENSION, UNSPECIFIED TYPE: ICD-10-CM

## 2021-01-01 DIAGNOSIS — K21.9 GASTROESOPHAGEAL REFLUX DISEASE WITHOUT ESOPHAGITIS: ICD-10-CM

## 2021-01-01 DIAGNOSIS — Z78.9 UNABLE TO CARE FOR SELF: ICD-10-CM

## 2021-01-01 LAB
ALBUMIN SERPL-MCNC: 3.4 G/DL (ref 3.4–5)
ALBUMIN SERPL-MCNC: 3.6 G/DL (ref 3.4–5)
ALBUMIN UR-MCNC: 10 MG/DL
ALBUMIN UR-MCNC: 20 MG/DL
ALP SERPL-CCNC: 56 U/L (ref 40–150)
ALP SERPL-CCNC: 71 U/L (ref 40–150)
ALT SERPL W P-5'-P-CCNC: 16 U/L (ref 0–70)
ALT SERPL W P-5'-P-CCNC: 22 U/L (ref 0–70)
ANION GAP SERPL CALCULATED.3IONS-SCNC: 5 MMOL/L (ref 3–14)
ANION GAP SERPL CALCULATED.3IONS-SCNC: 5 MMOL/L (ref 3–14)
ANION GAP SERPL CALCULATED.3IONS-SCNC: 6 MMOL/L (ref 3–14)
APPEARANCE UR: ABNORMAL
APPEARANCE UR: CLEAR
AST SERPL W P-5'-P-CCNC: 16 U/L (ref 0–45)
AST SERPL W P-5'-P-CCNC: 20 U/L (ref 0–45)
BACTERIA #/AREA URNS HPF: ABNORMAL /HPF
BACTERIA #/AREA URNS HPF: ABNORMAL /HPF
BASOPHILS # BLD AUTO: 0 10E9/L (ref 0–0.2)
BASOPHILS # BLD AUTO: 0 10E9/L (ref 0–0.2)
BASOPHILS NFR BLD AUTO: 0.4 %
BASOPHILS NFR BLD AUTO: 0.8 %
BILIRUB SERPL-MCNC: 0.6 MG/DL (ref 0.2–1.3)
BILIRUB SERPL-MCNC: 0.8 MG/DL (ref 0.2–1.3)
BILIRUB UR QL STRIP: NEGATIVE
BILIRUB UR QL STRIP: NEGATIVE
BUN SERPL-MCNC: 27 MG/DL (ref 7–30)
BUN SERPL-MCNC: 28 MG/DL (ref 7–30)
BUN SERPL-MCNC: 34 MG/DL (ref 7–30)
CALCIUM SERPL-MCNC: 7.5 MG/DL (ref 8.5–10.1)
CALCIUM SERPL-MCNC: 9 MG/DL (ref 8.5–10.1)
CALCIUM SERPL-MCNC: 9.1 MG/DL (ref 8.5–10.1)
CHLORIDE SERPL-SCNC: 108 MMOL/L (ref 94–109)
CHLORIDE SERPL-SCNC: 111 MMOL/L (ref 94–109)
CHLORIDE SERPL-SCNC: 115 MMOL/L (ref 94–109)
CO2 BLDCOV-SCNC: 26 MMOL/L (ref 21–28)
CO2 SERPL-SCNC: 21 MMOL/L (ref 20–32)
CO2 SERPL-SCNC: 23 MMOL/L (ref 20–32)
CO2 SERPL-SCNC: 25 MMOL/L (ref 20–32)
COLOR UR AUTO: ABNORMAL
COLOR UR AUTO: YELLOW
CREAT SERPL-MCNC: 1.46 MG/DL (ref 0.66–1.25)
CREAT SERPL-MCNC: 1.56 MG/DL (ref 0.66–1.25)
CREAT SERPL-MCNC: 1.96 MG/DL (ref 0.66–1.25)
DIFFERENTIAL METHOD BLD: ABNORMAL
DIFFERENTIAL METHOD BLD: ABNORMAL
EOSINOPHIL # BLD AUTO: 0.2 10E9/L (ref 0–0.7)
EOSINOPHIL # BLD AUTO: 0.3 10E9/L (ref 0–0.7)
EOSINOPHIL NFR BLD AUTO: 5.8 %
EOSINOPHIL NFR BLD AUTO: 6.3 %
ERYTHROCYTE [DISTWIDTH] IN BLOOD BY AUTOMATED COUNT: 14.6 % (ref 10–15)
ERYTHROCYTE [DISTWIDTH] IN BLOOD BY AUTOMATED COUNT: 14.6 % (ref 10–15)
ERYTHROCYTE [DISTWIDTH] IN BLOOD BY AUTOMATED COUNT: 14.9 % (ref 10–15)
GFR SERPL CREATININE-BSD FRML MDRD: 30 ML/MIN/{1.73_M2}
GFR SERPL CREATININE-BSD FRML MDRD: 39 ML/MIN/{1.73_M2}
GFR SERPL CREATININE-BSD FRML MDRD: 42 ML/MIN/{1.73_M2}
GLUCOSE SERPL-MCNC: 126 MG/DL (ref 70–99)
GLUCOSE SERPL-MCNC: 84 MG/DL (ref 70–99)
GLUCOSE SERPL-MCNC: 88 MG/DL (ref 70–99)
GLUCOSE UR STRIP-MCNC: NEGATIVE MG/DL
GLUCOSE UR STRIP-MCNC: NEGATIVE MG/DL
HCT VFR BLD AUTO: 39.1 % (ref 40–53)
HCT VFR BLD AUTO: 39.7 % (ref 40–53)
HCT VFR BLD AUTO: 41.5 % (ref 40–53)
HGB BLD-MCNC: 12.9 G/DL (ref 13.3–17.7)
HGB BLD-MCNC: 13.1 G/DL (ref 13.3–17.7)
HGB BLD-MCNC: 13.4 G/DL (ref 13.3–17.7)
HGB UR QL STRIP: ABNORMAL
HGB UR QL STRIP: NEGATIVE
HYALINE CASTS #/AREA URNS LPF: 21 /LPF (ref 0–2)
HYALINE CASTS #/AREA URNS LPF: 32 /LPF (ref 0–2)
IMM GRANULOCYTES # BLD: 0 10E9/L (ref 0–0.4)
IMM GRANULOCYTES # BLD: 0 10E9/L (ref 0–0.4)
IMM GRANULOCYTES NFR BLD: 0.2 %
IMM GRANULOCYTES NFR BLD: 0.3 %
INR PPP: 1.05 (ref 0.86–1.14)
INTERPRETATION ECG - MUSE: NORMAL
INTERPRETATION ECG - MUSE: NORMAL
KETONES UR STRIP-MCNC: NEGATIVE MG/DL
KETONES UR STRIP-MCNC: NEGATIVE MG/DL
LABORATORY COMMENT REPORT: NORMAL
LABORATORY COMMENT REPORT: NORMAL
LACTATE BLD-SCNC: 0.4 MMOL/L (ref 0.7–2.1)
LACTATE BLD-SCNC: 1.7 MMOL/L (ref 0.7–2)
LACTATE BLD-SCNC: NORMAL MMOL/L (ref 0.7–2)
LEUKOCYTE ESTERASE UR QL STRIP: NEGATIVE
LEUKOCYTE ESTERASE UR QL STRIP: NEGATIVE
LYMPHOCYTES # BLD AUTO: 1.3 10E9/L (ref 0.8–5.3)
LYMPHOCYTES # BLD AUTO: 2 10E9/L (ref 0.8–5.3)
LYMPHOCYTES NFR BLD AUTO: 33.9 %
LYMPHOCYTES NFR BLD AUTO: 40.7 %
MAGNESIUM SERPL-MCNC: 2.4 MG/DL (ref 1.6–2.3)
MCH RBC QN AUTO: 30.6 PG (ref 26.5–33)
MCH RBC QN AUTO: 31.2 PG (ref 26.5–33)
MCH RBC QN AUTO: 32.1 PG (ref 26.5–33)
MCHC RBC AUTO-ENTMCNC: 32.3 G/DL (ref 31.5–36.5)
MCHC RBC AUTO-ENTMCNC: 33 G/DL (ref 31.5–36.5)
MCHC RBC AUTO-ENTMCNC: 33 G/DL (ref 31.5–36.5)
MCV RBC AUTO: 95 FL (ref 78–100)
MCV RBC AUTO: 95 FL (ref 78–100)
MCV RBC AUTO: 97 FL (ref 78–100)
MONOCYTES # BLD AUTO: 0.4 10E9/L (ref 0–1.3)
MONOCYTES # BLD AUTO: 0.4 10E9/L (ref 0–1.3)
MONOCYTES NFR BLD AUTO: 8.5 %
MONOCYTES NFR BLD AUTO: 9.7 %
MUCOUS THREADS #/AREA URNS LPF: PRESENT /LPF
MUCOUS THREADS #/AREA URNS LPF: PRESENT /LPF
NEUTROPHILS # BLD AUTO: 1.9 10E9/L (ref 1.6–8.3)
NEUTROPHILS # BLD AUTO: 2.2 10E9/L (ref 1.6–8.3)
NEUTROPHILS NFR BLD AUTO: 43.9 %
NEUTROPHILS NFR BLD AUTO: 49.5 %
NITRATE UR QL: NEGATIVE
NITRATE UR QL: NEGATIVE
NRBC # BLD AUTO: 0 10*3/UL
NRBC # BLD AUTO: 0 10*3/UL
NRBC BLD AUTO-RTO: 0 /100
NRBC BLD AUTO-RTO: 0 /100
PCO2 BLDV: 50 MM HG (ref 40–50)
PH BLDV: 7.33 PH (ref 7.32–7.43)
PH UR STRIP: 5 PH (ref 5–7)
PH UR STRIP: 5 PH (ref 5–7)
PHENYTOIN SERPL-MCNC: 0.4 MG/L (ref 10–20)
PLATELET # BLD AUTO: 143 10E9/L (ref 150–450)
PLATELET # BLD AUTO: 150 10E9/L (ref 150–450)
PLATELET # BLD AUTO: 161 10E9/L (ref 150–450)
PO2 BLDV: 18 MM HG (ref 25–47)
POTASSIUM SERPL-SCNC: 3.6 MMOL/L (ref 3.4–5.3)
POTASSIUM SERPL-SCNC: 4.2 MMOL/L (ref 3.4–5.3)
POTASSIUM SERPL-SCNC: 4.3 MMOL/L (ref 3.4–5.3)
PROT SERPL-MCNC: 7 G/DL (ref 6.8–8.8)
PROT SERPL-MCNC: 7.9 G/DL (ref 6.8–8.8)
RBC # BLD AUTO: 4.02 10E12/L (ref 4.4–5.9)
RBC # BLD AUTO: 4.2 10E12/L (ref 4.4–5.9)
RBC # BLD AUTO: 4.38 10E12/L (ref 4.4–5.9)
RBC #/AREA URNS AUTO: 2 /HPF (ref 0–2)
RBC #/AREA URNS AUTO: 3 /HPF (ref 0–2)
SAO2 % BLDV FROM PO2: 23 %
SARS-COV-2 RNA RESP QL NAA+PROBE: NEGATIVE
SARS-COV-2 RNA RESP QL NAA+PROBE: NEGATIVE
SODIUM SERPL-SCNC: 136 MMOL/L (ref 133–144)
SODIUM SERPL-SCNC: 141 MMOL/L (ref 133–144)
SODIUM SERPL-SCNC: 142 MMOL/L (ref 133–144)
SOURCE: ABNORMAL
SOURCE: ABNORMAL
SP GR UR STRIP: 1.02 (ref 1–1.03)
SP GR UR STRIP: 1.02 (ref 1–1.03)
SPECIMEN SOURCE: NORMAL
SPECIMEN SOURCE: NORMAL
SQUAMOUS #/AREA URNS AUTO: <1 /HPF (ref 0–1)
SQUAMOUS #/AREA URNS AUTO: <1 /HPF (ref 0–1)
TRANS CELLS #/AREA URNS HPF: 1 /HPF (ref 0–1)
TROPONIN I SERPL-MCNC: 0.03 UG/L (ref 0–0.04)
TROPONIN I SERPL-MCNC: 0.03 UG/L (ref 0–0.04)
UROBILINOGEN UR STRIP-MCNC: NORMAL MG/DL (ref 0–2)
UROBILINOGEN UR STRIP-MCNC: NORMAL MG/DL (ref 0–2)
WBC # BLD AUTO: 3.8 10E9/L (ref 4–11)
WBC # BLD AUTO: 4.9 10E9/L (ref 4–11)
WBC # BLD AUTO: 6.4 10E9/L (ref 4–11)
WBC #/AREA URNS AUTO: 1 /HPF (ref 0–5)
WBC #/AREA URNS AUTO: 1 /HPF (ref 0–5)

## 2021-01-01 PROCEDURE — G0378 HOSPITAL OBSERVATION PER HR: HCPCS

## 2021-01-01 PROCEDURE — 93005 ELECTROCARDIOGRAM TRACING: CPT | Mod: 76

## 2021-01-01 PROCEDURE — 99225 PR SUBSEQUENT OBSERVATION CARE,LEVEL II: CPT | Performed by: PHYSICIAN ASSISTANT

## 2021-01-01 PROCEDURE — 87635 SARS-COV-2 COVID-19 AMP PRB: CPT | Performed by: EMERGENCY MEDICINE

## 2021-01-01 PROCEDURE — 99232 SBSQ HOSP IP/OBS MODERATE 35: CPT | Performed by: INTERNAL MEDICINE

## 2021-01-01 PROCEDURE — 258N000003 HC RX IP 258 OP 636: Performed by: EMERGENCY MEDICINE

## 2021-01-01 PROCEDURE — 85025 COMPLETE CBC W/AUTO DIFF WBC: CPT | Performed by: EMERGENCY MEDICINE

## 2021-01-01 PROCEDURE — 250N000013 HC RX MED GY IP 250 OP 250 PS 637: Performed by: PHYSICIAN ASSISTANT

## 2021-01-01 PROCEDURE — 99205 OFFICE O/P NEW HI 60 MIN: CPT | Performed by: NURSE PRACTITIONER

## 2021-01-01 PROCEDURE — 99220 PR INITIAL OBSERVATION CARE,LEVEL III: CPT | Performed by: PHYSICIAN ASSISTANT

## 2021-01-01 PROCEDURE — 99238 HOSP IP/OBS DSCHRG MGMT 30/<: CPT | Performed by: INTERNAL MEDICINE

## 2021-01-01 PROCEDURE — 96361 HYDRATE IV INFUSION ADD-ON: CPT

## 2021-01-01 PROCEDURE — 250N000011 HC RX IP 250 OP 636: Performed by: PHYSICIAN ASSISTANT

## 2021-01-01 PROCEDURE — 83605 ASSAY OF LACTIC ACID: CPT | Performed by: EMERGENCY MEDICINE

## 2021-01-01 PROCEDURE — 120N000004 HC R&B MS OVERFLOW

## 2021-01-01 PROCEDURE — 83735 ASSAY OF MAGNESIUM: CPT | Performed by: EMERGENCY MEDICINE

## 2021-01-01 PROCEDURE — 81001 URINALYSIS AUTO W/SCOPE: CPT | Performed by: EMERGENCY MEDICINE

## 2021-01-01 PROCEDURE — 84484 ASSAY OF TROPONIN QUANT: CPT | Performed by: EMERGENCY MEDICINE

## 2021-01-01 PROCEDURE — 99207 PR CDG-CHARGE REQUIRED MANUAL ENTRY: CPT | Performed by: INTERNAL MEDICINE

## 2021-01-01 PROCEDURE — C9803 HOPD COVID-19 SPEC COLLECT: HCPCS

## 2021-01-01 PROCEDURE — 99203 OFFICE O/P NEW LOW 30 MIN: CPT | Mod: 95 | Performed by: NURSE PRACTITIONER

## 2021-01-01 PROCEDURE — 250N000011 HC RX IP 250 OP 636: Performed by: EMERGENCY MEDICINE

## 2021-01-01 PROCEDURE — 99231 SBSQ HOSP IP/OBS SF/LOW 25: CPT | Performed by: INTERNAL MEDICINE

## 2021-01-01 PROCEDURE — 99207 PR CDG-CODE CATEGORY CHANGED: CPT | Performed by: INTERNAL MEDICINE

## 2021-01-01 PROCEDURE — 80053 COMPREHEN METABOLIC PANEL: CPT | Performed by: HOSPITALIST

## 2021-01-01 PROCEDURE — 99219 PR INITIAL OBSERVATION CARE,LEVEL II: CPT | Performed by: HOSPITALIST

## 2021-01-01 PROCEDURE — 93005 ELECTROCARDIOGRAM TRACING: CPT

## 2021-01-01 PROCEDURE — 70450 CT HEAD/BRAIN W/O DYE: CPT | Mod: ME

## 2021-01-01 PROCEDURE — 85610 PROTHROMBIN TIME: CPT | Performed by: EMERGENCY MEDICINE

## 2021-01-01 PROCEDURE — 99207 PR APP CREDIT; MD BILLING SHARED VISIT: CPT | Performed by: PHYSICIAN ASSISTANT

## 2021-01-01 PROCEDURE — 36415 COLL VENOUS BLD VENIPUNCTURE: CPT | Performed by: HOSPITALIST

## 2021-01-01 PROCEDURE — 250N000011 HC RX IP 250 OP 636: Performed by: INTERNAL MEDICINE

## 2021-01-01 PROCEDURE — 83605 ASSAY OF LACTIC ACID: CPT | Mod: 91

## 2021-01-01 PROCEDURE — 96374 THER/PROPH/DIAG INJ IV PUSH: CPT

## 2021-01-01 PROCEDURE — 85027 COMPLETE CBC AUTOMATED: CPT | Performed by: HOSPITALIST

## 2021-01-01 PROCEDURE — 82803 BLOOD GASES ANY COMBINATION: CPT

## 2021-01-01 PROCEDURE — 99207 PR CONSULT E&M CHANGED TO INITIAL LEVEL: CPT | Performed by: NURSE PRACTITIONER

## 2021-01-01 PROCEDURE — 99233 SBSQ HOSP IP/OBS HIGH 50: CPT | Performed by: PHYSICIAN ASSISTANT

## 2021-01-01 PROCEDURE — 96372 THER/PROPH/DIAG INJ SC/IM: CPT | Performed by: HOSPITALIST

## 2021-01-01 PROCEDURE — 99285 EMERGENCY DEPT VISIT HI MDM: CPT | Mod: 25

## 2021-01-01 PROCEDURE — 96372 THER/PROPH/DIAG INJ SC/IM: CPT | Performed by: PHYSICIAN ASSISTANT

## 2021-01-01 PROCEDURE — 250N000011 HC RX IP 250 OP 636: Performed by: HOSPITALIST

## 2021-01-01 PROCEDURE — 99217 PR OBSERVATION CARE DISCHARGE: CPT | Performed by: PHYSICIAN ASSISTANT

## 2021-01-01 PROCEDURE — 36415 COLL VENOUS BLD VENIPUNCTURE: CPT | Performed by: EMERGENCY MEDICINE

## 2021-01-01 PROCEDURE — 80053 COMPREHEN METABOLIC PANEL: CPT | Performed by: EMERGENCY MEDICINE

## 2021-01-01 PROCEDURE — 80048 BASIC METABOLIC PNL TOTAL CA: CPT | Performed by: EMERGENCY MEDICINE

## 2021-01-01 PROCEDURE — 80185 ASSAY OF PHENYTOIN TOTAL: CPT | Performed by: EMERGENCY MEDICINE

## 2021-01-01 PROCEDURE — 96372 THER/PROPH/DIAG INJ SC/IM: CPT | Performed by: EMERGENCY MEDICINE

## 2021-01-01 RX ORDER — OLANZAPINE 5 MG/1
5 TABLET, ORALLY DISINTEGRATING ORAL DAILY
Status: DISCONTINUED | OUTPATIENT
Start: 2021-01-01 | End: 2021-01-01 | Stop reason: HOSPADM

## 2021-01-01 RX ORDER — LISINOPRIL 10 MG/1
10 TABLET ORAL EVERY MORNING
Status: DISCONTINUED | OUTPATIENT
Start: 2021-01-01 | End: 2021-01-01

## 2021-01-01 RX ORDER — OLANZAPINE 5 MG/1
5 TABLET, ORALLY DISINTEGRATING ORAL DAILY
Qty: 30 TABLET | Refills: 0 | Status: SHIPPED | OUTPATIENT
Start: 2021-01-01

## 2021-01-01 RX ORDER — HALOPERIDOL 5 MG/ML
2 INJECTION INTRAMUSCULAR EVERY 6 HOURS PRN
Status: DISCONTINUED | OUTPATIENT
Start: 2021-01-01 | End: 2021-01-01 | Stop reason: HOSPADM

## 2021-01-01 RX ORDER — OLANZAPINE 10 MG/2ML
5 INJECTION, POWDER, FOR SOLUTION INTRAMUSCULAR DAILY PRN
Status: DISCONTINUED | OUTPATIENT
Start: 2021-01-01 | End: 2021-01-01 | Stop reason: HOSPADM

## 2021-01-01 RX ORDER — POLYETHYLENE GLYCOL 3350 17 G/17G
17 POWDER, FOR SOLUTION ORAL DAILY
Qty: 510 G | Refills: 0 | Status: SHIPPED | OUTPATIENT
Start: 2021-01-01

## 2021-01-01 RX ORDER — CLONIDINE HYDROCHLORIDE 0.1 MG/1
0.1 TABLET ORAL EVERY 6 HOURS PRN
Status: DISCONTINUED | OUTPATIENT
Start: 2021-01-01 | End: 2021-01-01

## 2021-01-01 RX ORDER — AMOXICILLIN 250 MG
1 CAPSULE ORAL 2 TIMES DAILY PRN
Status: DISCONTINUED | OUTPATIENT
Start: 2021-01-01 | End: 2021-01-01 | Stop reason: HOSPADM

## 2021-01-01 RX ORDER — ACETAMINOPHEN 325 MG/1
650 TABLET ORAL EVERY 4 HOURS PRN
Qty: 60 TABLET | Refills: 0 | Status: SHIPPED | OUTPATIENT
Start: 2021-01-01

## 2021-01-01 RX ORDER — QUETIAPINE FUMARATE 25 MG/1
25 TABLET, FILM COATED ORAL
Qty: 30 TABLET | Refills: 0 | Status: SHIPPED | OUTPATIENT
Start: 2021-01-01

## 2021-01-01 RX ORDER — QUETIAPINE FUMARATE 25 MG/1
25 TABLET, FILM COATED ORAL
Qty: 30 TABLET | Refills: 0
Start: 2021-01-01 | End: 2021-01-01

## 2021-01-01 RX ORDER — ACETAMINOPHEN 325 MG/1
650 TABLET ORAL EVERY 4 HOURS PRN
Status: DISCONTINUED | OUTPATIENT
Start: 2021-01-01 | End: 2021-01-01 | Stop reason: HOSPADM

## 2021-01-01 RX ORDER — OLANZAPINE 5 MG/1
5 TABLET, ORALLY DISINTEGRATING ORAL 2 TIMES DAILY PRN
Status: DISCONTINUED | OUTPATIENT
Start: 2021-01-01 | End: 2021-01-01 | Stop reason: HOSPADM

## 2021-01-01 RX ORDER — HALOPERIDOL 5 MG/ML
1-2 INJECTION INTRAMUSCULAR EVERY 6 HOURS PRN
Status: DISCONTINUED | OUTPATIENT
Start: 2021-01-01 | End: 2021-01-01

## 2021-01-01 RX ORDER — OLANZAPINE 5 MG/1
5 TABLET, ORALLY DISINTEGRATING ORAL DAILY
Qty: 30 TABLET | Refills: 0
Start: 2021-01-01 | End: 2021-01-01

## 2021-01-01 RX ORDER — QUETIAPINE FUMARATE 50 MG/1
50 TABLET, FILM COATED ORAL EVERY EVENING
Qty: 30 TABLET | Refills: 0 | Status: SHIPPED | OUTPATIENT
Start: 2021-01-01

## 2021-01-01 RX ORDER — POLYETHYLENE GLYCOL 3350 17 G/17G
17 POWDER, FOR SOLUTION ORAL DAILY PRN
Status: DISCONTINUED | OUTPATIENT
Start: 2021-01-01 | End: 2021-01-01 | Stop reason: HOSPADM

## 2021-01-01 RX ORDER — CALCIUM CARBONATE 500 MG/1
1 TABLET, CHEWABLE ORAL 3 TIMES DAILY PRN
Qty: 60 TABLET | Refills: 0
Start: 2021-01-01 | End: 2021-01-01

## 2021-01-01 RX ORDER — ALBUTEROL SULFATE 5 MG/ML
5 SOLUTION RESPIRATORY (INHALATION) EVERY 4 HOURS PRN
COMMUNITY

## 2021-01-01 RX ORDER — HEPARIN SODIUM 5000 [USP'U]/.5ML
5000 INJECTION, SOLUTION INTRAVENOUS; SUBCUTANEOUS EVERY 12 HOURS
Status: DISCONTINUED | OUTPATIENT
Start: 2021-01-01 | End: 2021-01-01 | Stop reason: HOSPADM

## 2021-01-01 RX ORDER — PHENYTOIN SODIUM 100 MG/1
100 CAPSULE, EXTENDED RELEASE ORAL DAILY
Status: DISCONTINUED | OUTPATIENT
Start: 2021-01-01 | End: 2021-01-01

## 2021-01-01 RX ORDER — ACETAMINOPHEN 325 MG/1
650 TABLET ORAL EVERY 4 HOURS PRN
Qty: 60 TABLET | Refills: 0
Start: 2021-01-01 | End: 2021-01-01

## 2021-01-01 RX ORDER — ONDANSETRON 4 MG/1
4 TABLET, ORALLY DISINTEGRATING ORAL EVERY 6 HOURS PRN
Status: DISCONTINUED | OUTPATIENT
Start: 2021-01-01 | End: 2021-01-01 | Stop reason: HOSPADM

## 2021-01-01 RX ORDER — IPRATROPIUM BROMIDE AND ALBUTEROL SULFATE 2.5; .5 MG/3ML; MG/3ML
1 SOLUTION RESPIRATORY (INHALATION)
COMMUNITY

## 2021-01-01 RX ORDER — ONDANSETRON 2 MG/ML
4 INJECTION INTRAMUSCULAR; INTRAVENOUS EVERY 6 HOURS PRN
Status: DISCONTINUED | OUTPATIENT
Start: 2021-01-01 | End: 2021-01-01 | Stop reason: HOSPADM

## 2021-01-01 RX ORDER — MORPHINE SULFATE 30 MG/1
2.5 TABLET ORAL
COMMUNITY

## 2021-01-01 RX ORDER — ACETAMINOPHEN 650 MG/1
650 SUPPOSITORY RECTAL EVERY 4 HOURS PRN
Status: DISCONTINUED | OUTPATIENT
Start: 2021-01-01 | End: 2021-01-01 | Stop reason: HOSPADM

## 2021-01-01 RX ORDER — QUETIAPINE FUMARATE 25 MG/1
25 TABLET, FILM COATED ORAL
Status: DISCONTINUED | OUTPATIENT
Start: 2021-01-01 | End: 2021-01-01 | Stop reason: HOSPADM

## 2021-01-01 RX ORDER — CALCIUM CARBONATE 500 MG/1
500 TABLET, CHEWABLE ORAL 3 TIMES DAILY PRN
Status: DISCONTINUED | OUTPATIENT
Start: 2021-01-01 | End: 2021-01-01 | Stop reason: HOSPADM

## 2021-01-01 RX ORDER — POLYETHYLENE GLYCOL 3350 17 G/17G
17 POWDER, FOR SOLUTION ORAL DAILY
Qty: 510 G | Refills: 0
Start: 2021-01-01 | End: 2021-01-01

## 2021-01-01 RX ORDER — QUETIAPINE FUMARATE 50 MG/1
50 TABLET, FILM COATED ORAL EVERY EVENING
Status: DISCONTINUED | OUTPATIENT
Start: 2021-01-01 | End: 2021-01-01 | Stop reason: HOSPADM

## 2021-01-01 RX ORDER — LORAZEPAM 0.5 MG/1
0.5 TABLET ORAL EVERY 4 HOURS PRN
COMMUNITY
End: 2021-01-01

## 2021-01-01 RX ORDER — QUETIAPINE FUMARATE 25 MG/1
25 TABLET, FILM COATED ORAL DAILY PRN
COMMUNITY
End: 2021-01-01

## 2021-01-01 RX ORDER — HALOPERIDOL 5 MG/ML
5 INJECTION INTRAMUSCULAR ONCE
Status: COMPLETED | OUTPATIENT
Start: 2021-01-01 | End: 2021-01-01

## 2021-01-01 RX ORDER — LORAZEPAM 0.5 MG/1
0.5 TABLET ORAL EVERY 4 HOURS PRN
COMMUNITY

## 2021-01-01 RX ORDER — QUETIAPINE FUMARATE 25 MG/1
25 TABLET, FILM COATED ORAL 2 TIMES DAILY
Status: DISCONTINUED | OUTPATIENT
Start: 2021-01-01 | End: 2021-01-01

## 2021-01-01 RX ORDER — QUETIAPINE FUMARATE 50 MG/1
50 TABLET, FILM COATED ORAL EVERY EVENING
Qty: 30 TABLET | Refills: 0
Start: 2021-01-01 | End: 2021-01-01

## 2021-01-01 RX ORDER — CALCIUM CARBONATE 500 MG/1
1 TABLET, CHEWABLE ORAL 3 TIMES DAILY PRN
Qty: 60 TABLET | Refills: 0 | Status: SHIPPED | OUTPATIENT
Start: 2021-01-01

## 2021-01-01 RX ORDER — HALOPERIDOL 5 MG/ML
2 INJECTION INTRAMUSCULAR EVERY 6 HOURS PRN
Status: DISCONTINUED | OUTPATIENT
Start: 2021-01-01 | End: 2021-01-01

## 2021-01-01 RX ORDER — HYDRALAZINE HYDROCHLORIDE 20 MG/ML
10 INJECTION INTRAMUSCULAR; INTRAVENOUS ONCE
Status: COMPLETED | OUTPATIENT
Start: 2021-01-01 | End: 2021-01-01

## 2021-01-01 RX ORDER — AMOXICILLIN 250 MG
2 CAPSULE ORAL 2 TIMES DAILY PRN
Status: DISCONTINUED | OUTPATIENT
Start: 2021-01-01 | End: 2021-01-01 | Stop reason: HOSPADM

## 2021-01-01 RX ORDER — LABETALOL HYDROCHLORIDE 5 MG/ML
10 INJECTION, SOLUTION INTRAVENOUS EVERY 4 HOURS PRN
Status: DISCONTINUED | OUTPATIENT
Start: 2021-01-01 | End: 2021-01-01 | Stop reason: HOSPADM

## 2021-01-01 RX ORDER — LIDOCAINE 40 MG/G
CREAM TOPICAL
Status: DISCONTINUED | OUTPATIENT
Start: 2021-01-01 | End: 2021-01-01

## 2021-01-01 RX ADMIN — HEPARIN SODIUM 5000 UNITS: 5000 INJECTION INTRAVENOUS; SUBCUTANEOUS at 00:29

## 2021-01-01 RX ADMIN — OLANZAPINE 5 MG: 10 INJECTION, POWDER, LYOPHILIZED, FOR SOLUTION INTRAMUSCULAR at 16:34

## 2021-01-01 RX ADMIN — QUETIAPINE FUMARATE 25 MG: 25 TABLET ORAL at 20:40

## 2021-01-01 RX ADMIN — ACETAMINOPHEN 650 MG: 325 TABLET, FILM COATED ORAL at 21:03

## 2021-01-01 RX ADMIN — HALOPERIDOL LACTATE 2 MG: 5 INJECTION, SOLUTION INTRAMUSCULAR at 15:35

## 2021-01-01 RX ADMIN — OLANZAPINE 5 MG: 5 TABLET, ORALLY DISINTEGRATING ORAL at 20:46

## 2021-01-01 RX ADMIN — OLANZAPINE 5 MG: 5 TABLET, ORALLY DISINTEGRATING ORAL at 21:03

## 2021-01-01 RX ADMIN — HALOPERIDOL LACTATE 2 MG: 5 INJECTION, SOLUTION INTRAMUSCULAR at 13:59

## 2021-01-01 RX ADMIN — HYDRALAZINE HYDROCHLORIDE 10 MG: 20 INJECTION INTRAMUSCULAR; INTRAVENOUS at 20:41

## 2021-01-01 RX ADMIN — ONDANSETRON 4 MG: 4 TABLET, ORALLY DISINTEGRATING ORAL at 16:15

## 2021-01-01 RX ADMIN — HALOPERIDOL LACTATE 5 MG: 5 INJECTION, SOLUTION INTRAMUSCULAR at 16:14

## 2021-01-01 RX ADMIN — CALCIUM CARBONATE (ANTACID) CHEW TAB 500 MG 500 MG: 500 CHEW TAB at 18:47

## 2021-01-01 RX ADMIN — QUETIAPINE FUMARATE 25 MG: 25 TABLET ORAL at 10:48

## 2021-01-01 RX ADMIN — QUETIAPINE FUMARATE 25 MG: 25 TABLET ORAL at 07:56

## 2021-01-01 RX ADMIN — OLANZAPINE 5 MG: 5 TABLET, ORALLY DISINTEGRATING ORAL at 09:14

## 2021-01-01 RX ADMIN — OLANZAPINE 5 MG: 5 TABLET, ORALLY DISINTEGRATING ORAL at 12:03

## 2021-01-01 RX ADMIN — QUETIAPINE FUMARATE 25 MG: 25 TABLET ORAL at 20:46

## 2021-01-01 RX ADMIN — CALCIUM CARBONATE (ANTACID) CHEW TAB 500 MG 500 MG: 500 CHEW TAB at 14:13

## 2021-01-01 RX ADMIN — SODIUM CHLORIDE 500 ML: 9 INJECTION, SOLUTION INTRAVENOUS at 18:09

## 2021-01-01 RX ADMIN — QUETIAPINE FUMARATE 25 MG: 25 TABLET ORAL at 08:06

## 2021-01-01 RX ADMIN — HALOPERIDOL LACTATE 2 MG: 5 INJECTION, SOLUTION INTRAMUSCULAR at 18:47

## 2021-01-01 RX ADMIN — QUETIAPINE 50 MG: 50 TABLET ORAL at 19:51

## 2021-01-01 RX ADMIN — QUETIAPINE 50 MG: 50 TABLET ORAL at 18:13

## 2021-01-01 RX ADMIN — QUETIAPINE 50 MG: 50 TABLET ORAL at 17:48

## 2021-01-01 RX ADMIN — OLANZAPINE 5 MG: 5 TABLET, ORALLY DISINTEGRATING ORAL at 14:04

## 2021-01-01 RX ADMIN — QUETIAPINE FUMARATE 25 MG: 25 TABLET ORAL at 09:15

## 2021-01-01 RX ADMIN — OLANZAPINE 5 MG: 10 INJECTION, POWDER, LYOPHILIZED, FOR SOLUTION INTRAMUSCULAR at 16:02

## 2021-01-01 RX ADMIN — Medication 1 MG: at 21:03

## 2021-01-01 ASSESSMENT — ENCOUNTER SYMPTOMS
ABDOMINAL PAIN: 0
DYSURIA: 0
FEVER: 0
WEAKNESS: 1
FEVER: 0
VOMITING: 0
BACK PAIN: 0
DIARRHEA: 0
NAUSEA: 0
HEADACHES: 0
CONFUSION: 1
SHORTNESS OF BREATH: 0
CHILLS: 0
COUGH: 0

## 2021-01-01 ASSESSMENT — MIFFLIN-ST. JEOR: SCORE: 1287.7

## 2021-06-23 PROBLEM — R53.1 GENERALIZED WEAKNESS: Status: ACTIVE | Noted: 2021-01-01

## 2021-06-23 PROBLEM — I16.0 HYPERTENSIVE URGENCY: Status: ACTIVE | Noted: 2021-01-01

## 2021-06-23 NOTE — ED TRIAGE NOTES
Patient presents via ambulance with generalized weakness. Patient was walking from his residence to [facility] about 7-8 blocks away. On his way back from [facility] he stop under a tree to get some rest. A bystander called 911 as patient was sitting outside for about an 1.5 hours. Upon EMS arrival patient reported his legs were too weak to get up. EMS was able to stand and assist him to the stretcher, he was a bit shaky in his feet. A&Ox4.

## 2021-06-23 NOTE — ED PROVIDER NOTES
History   Chief Complaint:  Generalized Weakness     The history is provided by the patient and the EMS personnel.      Kofi Haddad is an 88 year old male with history of seizures, hypertension, and aortic stenosis who presents via EMS with generalized weakness. Kofi was walking home from [employer] after getting dog food, when he stopped and sat down under a tree to get some rest. He was then unable to get up due to weakness so a bystander called EMS after noticing he had been sitting outside for greater than 1 hour. On EMS arrival, he was able to stand and walk to the stretcher with assistance, but was shaky.     Kofi denies fall/trauma or loss of consciousness. He denies any chest pain or shortness of breath. He has had no recent fever, cough, nausea, vomiting, diarrhea, or dysuria. He thinks he may have gotten overheated and maybe hasn't been drinking enough water. He is currently feeling well.    Review of Systems   Constitutional: Negative for fever.   Respiratory: Negative for cough and shortness of breath.    Cardiovascular: Negative for chest pain.   Gastrointestinal: Negative for diarrhea, nausea and vomiting.   Genitourinary: Negative for dysuria.   Neurological: Positive for weakness. Negative for syncope.   All other systems reviewed and are negative.    Allergies:  The patient has no known allergies.     Medications:  Dilantin  Lisinopril    Past Medical History:    High cholesterol  Hypertension  Seizures    Severe aortic stenosis  CKD stage 3  BCC  Prostate cancer  GERD    Past Surgical History:     surgery    Orchiectomy     Social History:  Patient presents with EMS.  Lives alone with his dog.     Physical Exam     Patient Vitals for the past 24 hrs:   BP Temp Temp src Pulse Resp SpO2   06/23/21 2342 (!) 173/76 -- -- 70 24 97 %   06/23/21 2245 -- -- -- 66 10 97 %   06/23/21 2230 (!) 144/73 -- -- -- -- 97 %   06/23/21 2226 -- -- -- 77 -- 98 %   06/23/21 2130 (!) 182/84 -- -- 87 13 --    06/23/21 2115 -- -- -- 50 -- 96 %   06/23/21 2045 (!) 204/70 -- -- 75 -- 96 %   06/23/21 2041 (!) 210/109 -- -- -- -- --   06/23/21 2000 (!) 228/105 -- -- 79 11 95 %   06/23/21 1945 (!) 225/116 -- -- 75 15 99 %   06/23/21 1930 -- -- -- 70 22 95 %   06/23/21 1915 -- -- -- 75 19 94 %   06/23/21 1900 -- -- -- 55 16 96 %   06/23/21 1830 (!) 178/81 -- -- (!) 37 20 95 %   06/23/21 1815 (!) 164/66 -- -- 60 11 94 %   06/23/21 1800 (!) 155/73 -- -- 77 29 94 %   06/23/21 1755 -- -- -- 77 10 96 %   06/23/21 1754 -- -- -- (!) 42 11 --   06/23/21 1750 (!) 178/88 97.6  F (36.4  C) Oral 79 21 95 %       Physical Exam  General: Well-developed and well-nourished. Well appearing elderly  man. Cooperative.  Head:  Atraumatic.  Eyes:  Conjunctivae, lids, and sclerae are normal.  ENT:    Normal nose. Moist mucous membranes.  Neck:  Supple. Normal range of motion.  CV:  Intermittently bradycardic rate and regular rhythm. Systolic murmur.  Resp:  No respiratory distress. Clear to auscultation bilaterally without decreased breath sounds, wheezing, rales, or rhonchi.  GI:  Soft. Non-distended. Non-tender.    MS:  Normal ROM. No bilateral lower extremity edema.  Skin:  Warm. Non-diaphoretic. No pallor.  Neuro:  Awake. A&Ox3. Normal strength.  Psych: Normal mood and affect. Normal speech.  Vitals reviewed.    Emergency Department Course     EKG #1  Indication: Weakness  Time: 1755  Rate 47 bpm. OH interval 144. QRS duration 132. QT/QTc 450/398.   Sinus bradycardia with PSCs   Left axis deviation, Right BBB  No acute ST changes.  Rate decreased from 76 BPM as compared to prior, dated 11/15/19.    EKG #2  Indication: Weakness  Time: 2255  Rate 67 bpm. OH interval 176. QRS duration 130. QT/QTc 436/460.   Sinus rhythm with marked sinus arhythmia, Right BBB  Left anterior fascicular block  No acute ST changes.  Rate increased from 47 BPM, LAFB new as compared to prior today.    Laboratory:    CBC: WBC: 4.9, HGB: 13.1 (L), PLT: 150  BMP:  Chloride: 115 (H), GFR: 42 (L), Calcium: 7.5 (L), o/w WNL (Creatinine: 1.46 (H))    Troponin (Collected 1758): 0.031  Repeat Troponin (Collected 2002): 0.031  Phenytoin Level: 0.4 (L)  ISTAT gases lactate coleman POCT: pH 7.33 / PCO2 50 / PO2 18 (L) / Bicarb 26 / Lactic 0.4 (L)    Asymptomatic COVID PCR: Pending     Emergency Department Course:    Reviewed:  I reviewed the patient's nursing notes, vitals, past medical records, and Care Everywhere.     Assessments:  1755    I evaluated the patient and performed an exam as above.    1929    I updated the patient on results and discussed plan of care. We discussed admission, but he declines because he needs to go home and feed his dog.     2100 Patient's daughters have talked with him by phone and convinced him to stay in the hospital.    2221 I updated the patient on plan of care.     Consults:  2216 I spoke with Dr. De Paz of the hospitalist service regarding admission.     Interventions:  1809 NS, 500 mL, IV  2041 Apresoline, 10 mg, IV    Disposition:  The patient was admitted to the hospital under the care of Dr. De Paz.     Impression & Plan     Medical Decision Making:  Kofi is an 88-year-old man who was out walking to buy dog food and on his way home felt very weak.  He thought he may be overheated so he rested in the shade under a tree.  However, he was reportedly there for some time and unable to get up due to his weakness which prompted call to paramedics.  He denies any chest pain, shortness of breath, or recent illness.  He states he feels little fatigued but otherwise well.  He denies syncope.  His exam is remarkable for intermittent bradycardia down to the 30s although typically he is in the 70s.  We did get an EKG when he was in the 40s which appears to be sinus rhythm.  He also has a systolic murmur and known aortic stenosis.  2 troponins were obtained 2 hours apart and are negative but detectable at 0.031.  The remainder of his laboratory studies are  overall reassuring without leukocytosis, acidosis, electrolyte derangements, or kidney injury (baseline creatinine of 1.46).  His phenytoin level is very low although review of records indicates he is supposed to be on this.  His family later indicated to nursing staff that he does not take his medications which may explain why he has significant uncontrolled hypertension to greater than 220 systolic.  He was given hydralazine with appropriate improvement.  The etiology for his weakness is unclear.  It could be as simple as exertion in the heat although his underlying aortic stenosis with this fatigue is quite concerning.  I am also concerned by his severely uncontrolled hypertension and medication noncompliance in addition to intermittent bradycardia down to the 30s.  All of these could be contributing to his weakness.  Particularly because he lives independently and with his advanced age, he is unsafe for discharge and warrants observation status.  Kofi was very reluctant to do this but ultimately his family was able to talk him into it and care for his dog such that he is agreeable to an overnight stay.  He had no further concerns and all of his questions were answered.  I discussed his case with Dr. De Paz, hospitalist, who accepts admission and has no further orders.    Covid-19  Kofi Haddad was evaluated during a global COVID-19 pandemic, which necessitated consideration that the patient might be at risk for infection with the SARS-CoV-2 virus that causes COVID-19.   Applicable protocols for evaluation were followed during the patient's care.   COVID-19 was considered as part of the patient's evaluation. The plan for testing is:  a test was obtained during this visit.    Diagnosis:    ICD-10-CM    1. Generalized weakness  R53.1    2. Uncontrolled hypertension  I10    3. Bradycardia  R00.1    4. Noncompliance with medication regimen  Z91.14        Scribe Disclosure:  Pallavi RADFORD, am serving  as a scribe at 5:48 PM on 6/23/2021 to document services personally performed by Kayla Fulton MD based on my observations and the provider's statements to me.      Kayla Fulton MD  06/24/21 0204

## 2021-06-23 NOTE — ED NOTES
Bed: ED33  Expected date: 6/23/21  Expected time: 5:39 PM  Means of arrival: Ambulance  Comments:  BV 1

## 2021-06-24 NOTE — CONSULTS
[facility]  Palliative Care Consultation   Text Page    Assessment & Plan   Kofi Haddad is a 88 year old male who was admitted on 6/23/2021.   Consulted by Julián De Paz MD to assist with symptom management and goals of care.    Recommendations:  1. Goals of Care- No CPR- Do NOT Intubate  Hospitalization goals discussed  The patient in the past, when he had better mental clarity stated to his daughters he did not want aggressive measures, refused TAVR for severe AORTIC STENOSIS. They feel he would not have a pace maker placed.   They are interested in hospice care and would like to try to honor his wishes to die at home.      Decisional Capacity- Unreliable. Patient does not have an advance directive. Per  informed consent policy next of kin should be involved in all consent and decision making. Daughters Genie Crain and Meche Black are decision makers by proxy  POLST  Completed indicating comfort focused care, avoid rehospitalization.     2. Pain denies pain   Patient's opioid use in past 24 hours: 0 = 0mg Daily Morphine Equivalent  [place] Board of Pharmacy Data Base Reviewed:    YES; As expected, no concern for misuse/abuse of controlled medications based on this report.      3. Dyspnea   continue to monitor in the setting of bradycardia ,HTN urgency and severe AORTIC STENOSIS    4. Spiritual Care  Oriented to Spiritual Health as part of Palliative Care team.  Spiritual Background: Nondenominational     5. Care Planning  Appreciate Care of HOLLAND Hernadez . Hospice meeting with [facility] scheduled for 2 pm today.  Family declined hospice at this time           Medical Decision Making and Goals of Care:  Discussed on June 24, 2021 with Ethel Matthew RN, PGMT-BC, APRN, CNP,ACHPN: I talked with daughters separately by phone. I introduced myself and the reason for the consult.  I explained that I was here to assist in decisional support, help ascertain  goals of care and address symptoms that may be troublesome that arise from the medical illness. They both expressed to me their concerns for his safety. They both would like to honor his wishes of no medical intervention., hospitalization .  They want per his wishes for him to die at home. They were open to a transition to hospice at home and a comfort plan.  Genie told me that there was no one available to care for him at home 24/7 . She also felt it would be too hard on him if he enrolled on hospice at [facility] or hospice [facility], he also would be without his dog.  I asked her to keep and open mind and explore the options.   Meche and Genie arrived in the patient room for the scheduled hospice visit. I received a call from the  Irene Magallanes that they not longer want hospice and would like to take him home.  I arrived on the unit and met with Meche and Genie. We completed the POLST with original with one copy given to the family. Plans are to discharge later today in the care of his daughters.     Thank you for involving us in the patient's care.     Ethel Matthew RN, PGMT-BC,APRN, CNP, ACHPN  Pain Management and Palliative Care  [facility]  Pgr: [phone]    Time Spent on this Encounter   Total unit/floor time 120  minutes, time consisted of the following, examination of the patient, reviewing the record and completing documentation. >50% of time spent in counseling and coordination of care, Bedside Nurse Neelam Martines RN, Hospitalist Jozef Simon PA-C and  Fatou Magallanes .  Time spend counseling with patient and family consisted of the following topics, goals of care, care planning for discharge and symptom management.    Understanding of disease process:   This has been discussed with family. They have good understanding, however they are requesting discharge with out adequate disposition planning.    History of Present Illness   History is obtained  from the electronic health record and patient's daughters    Kofi Haddad is a 88 year old male with a past medical history of severe AORTIC STENOSIS , seizure disorder, intermittent bradycardia, dementia and cancer who presents with generalized weakness, bradycardia and hypertensive urgency. He was brought into the ED via EMS walking after he bought groceries and stopped at a tree to rest for about 1.5hrs.   He did not appear to have fallen, but was shakey, and had nor eaten or drank much.   As noted he has a seizure d/o with chronic subtherapeutic levels. The phenytoin level today was 0.4 mg. Renal function support stage 3 with CrCl at 31.  He is confused and restless, but easliy redirectable. He appears with clothing that is worn, gurinder and dirty.  He denies appetite changes, urinary urgency or pain constipation, SOB or chest pain. He is afebrile with no recent illness and no longer in hypertensive urgency.   His daughter are interested in hospice care at home, in the setting of severe AORTIC STENOSIS and his refusal to have surgery. They do not feel he would consent to a pacemaker as well.     has had no recent hospitalizations, weight loss or change in function. Hid daughter Meche is over at his home at least 3 days per week to assist him.  Palliative Performance Scale (PPS): 60%  Significant disease.  Normal or reduced intake. Normal LOC or confusion. Reduced ambulation, some assist w/self-care, unable to work/do housework.  Estimated Median Survival in Days: 29 to 108 days.      Past Medical History   I have reviewed this patient's medical history and updated it with pertinent information if needed.   Past Medical History:   Diagnosis Date     Cancer (H)      High cholesterol      Hypertension      Seizures (H)        Past Surgical History   I have reviewed this patient's surgical history and updated it with pertinent information if needed.  Past Surgical History:   Procedure Laterality Date      GENITOURINARY SURGERY         Prior to Admission Medications   None     Allergies   No Known Allergies    Social History   I have updated and reviewed the following Social History Narrative:   Social History     Social History Narrative     Not on file            Living situation:  Alone  for 12 years       Support system:  daughters       Actual/Potential Caregiver:  Daughter, but neither can move in and care for him 24 /7       Functional status:  Declined but unchanged        Financial concerns:  Potentially        Substance use disorder (past / present):  None        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   History reviewed. No pertinent family history.    Review of Systems   The 10 point Review of Systems is negative other than noted in the HPI or here.     Palliative Symptom Review (0=no symptom/no concern, 1=mild, 2=moderate, 3=severe):      Pain: 0-none      Fatigue: 2-moderate      Nausea: 0-none      Constipation: 0-none      Diarrhea: 0-none      Depressive Symptoms: 0-none      Anxiety: 0-none      Drowsiness: 0-none      Poor Appetite: 0-none      Shortness of Breath: 0-none      Insomnia: 0-none      Other: confusion/ restlessness 3-severe      Overall (0 good/no concerns, 3 very poor):  2+    Physical Exam   Temp:  [95.6  F (35.3  C)-97.9  F (36.6  C)] 97.9  F (36.6  C)  Pulse:  [37-87] 77  Resp:  [10-29] 18  BP: (144-228)/() 144/75  SpO2:  [94 %-99 %] 98 %  148 lbs 12.8 oz  Exam:  GEN:  Alert, oriented x 3, appears comfortable, NAD.  HEENT:  Normocephalic/atraumatic, no scleral icterus, no nasal discharge, mouth moist.  CV:  RRR, S1, S2; + aortic murmur or no other irregularities noted.  +3 DP/PT pulses bilatererally; no edema BLE.   RESP:  Clear to auscultation bilaterally without rales/rhonchi/wheezing/retractions.  Symmetric chest  rise on inhalation noted.  Normal respiratory effort.  ABD:  Rounded, soft, non-tender/non-distended.  +BS  EXT:  Edema & pulses as noted above.  CMS intact x 4.     M/S:   Nontender to palpation  throughout .    SKIN:  Dry to touch, no exanthems noted in the visualized areas.    NEURO: Symmetric movement +5/5.  Sensation to touch intact all extremities.   There is no area of allodynia or hyperesthesia.  PAIN BEHAVIOR: Cooperative  Psych:  Normal affect.  Calm, cooperative, conversant appropriately.    Delirium Screen/CAM:  Delirium = (#1 and #2 = YES) + (#3 and/or #4)   1) Acute onset and fluctuating course:   No   (acute change in mental status from baseline over last 24 hours)  2) Inattention:   YES   (difficulty focusing, distractible, can't follow conversation)  3) Disorganized thinking:   YES   (score only if #1 and #2 are YES)  (rambling/irrelevant conversation, unclear/illogical thoughts, inconsistency)  4) Altered level of consciousness:   No   (score only if #1 and #2 are YES)  (other than alert, calm, cooperative)    Delirium/CAM score: 0/4  Interpretation:  1)  Delirium:  Present  2)  Type:  hyperactive  3)  Severity:  severe    Data   Results for orders placed or performed during the hospital encounter of 06/23/21 (from the past 24 hour(s))   EKG 12 lead   Result Value Ref Range    Interpretation ECG Click View Image link to view waveform and result    Phenytoin level   Result Value Ref Range    Phenytoin Level 0.4 (L) 10 - 20 mg/L   CBC with platelets differential   Result Value Ref Range    WBC 4.9 4.0 - 11.0 10e9/L    RBC Count 4.20 (L) 4.4 - 5.9 10e12/L    Hemoglobin 13.1 (L) 13.3 - 17.7 g/dL    Hematocrit 39.7 (L) 40.0 - 53.0 %    MCV 95 78 - 100 fl    MCH 31.2 26.5 - 33.0 pg    MCHC 33.0 31.5 - 36.5 g/dL    RDW 14.6 10.0 - 15.0 %    Platelet Count 150 150 - 450 10e9/L    Diff Method Automated Method     % Neutrophils 43.9 %    % Lymphocytes 40.7 %    % Monocytes 8.5 %    % Eosinophils 6.3 %    %  Basophils 0.4 %    % Immature Granulocytes 0.2 %    Nucleated RBCs 0 0 /100    Absolute Neutrophil 2.2 1.6 - 8.3 10e9/L    Absolute Lymphocytes 2.0 0.8 - 5.3 10e9/L    Absolute Monocytes 0.4 0.0 - 1.3 10e9/L    Absolute Eosinophils 0.3 0.0 - 0.7 10e9/L    Absolute Basophils 0.0 0.0 - 0.2 10e9/L    Abs Immature Granulocytes 0.0 0 - 0.4 10e9/L    Absolute Nucleated RBC 0.0    Basic metabolic panel   Result Value Ref Range    Sodium 142 133 - 144 mmol/L    Potassium 3.6 3.4 - 5.3 mmol/L    Chloride 115 (H) 94 - 109 mmol/L    Carbon Dioxide 21 20 - 32 mmol/L    Anion Gap 6 3 - 14 mmol/L    Glucose 88 70 - 99 mg/dL    Urea Nitrogen 27 7 - 30 mg/dL    Creatinine 1.46 (H) 0.66 - 1.25 mg/dL    GFR Estimate 42 (L) >60 mL/min/[1.73_m2]    GFR Estimate If Black 49 (L) >60 mL/min/[1.73_m2]    Calcium 7.5 (L) 8.5 - 10.1 mg/dL   Lactic acid whole blood   Result Value Ref Range    Lactic Acid  0.7 - 2.0 mmol/L     Specimen integrity questioned, unable to obtain results   Troponin I   Result Value Ref Range    Troponin I ES 0.031 0.000 - 0.045 ug/L   ISTAT gases lactate coleman POCT   Result Value Ref Range    Ph Venous 7.33 7.32 - 7.43 pH    PCO2 Venous 50 40 - 50 mm Hg    PO2 Venous 18 (L) 25 - 47 mm Hg    Bicarbonate Venous 26 21 - 28 mmol/L    O2 Sat Venous 23 %    Lactic Acid 0.4 (L) 0.7 - 2.1 mmol/L   Troponin I (now)   Result Value Ref Range    Troponin I ES 0.031 0.000 - 0.045 ug/L   Asymptomatic SARS-CoV-2 COVID-19 Virus (Coronavirus) by PCR    Specimen: Nasopharyngeal   Result Value Ref Range    SARS-CoV-2 Virus Specimen Source Nasopharyngeal     SARS-CoV-2 PCR Result NEGATIVE     SARS-CoV-2 PCR Comment (Note)    UA with Microscopic   Result Value Ref Range    Color Urine Light Yellow     Appearance Urine Clear     Glucose Urine Negative NEG^Negative mg/dL    Bilirubin Urine Negative NEG^Negative    Ketones Urine Negative NEG^Negative mg/dL    Specific Gravity Urine 1.016 1.003 - 1.035    Blood Urine Negative NEG^Negative     pH Urine 5.0 5.0 - 7.0 pH    Protein Albumin Urine 10 (A) NEG^Negative mg/dL    Urobilinogen mg/dL Normal 0.0 - 2.0 mg/dL    Nitrite Urine Negative NEG^Negative    Leukocyte Esterase Urine Negative NEG^Negative    Source Unspecified Urine     WBC Urine 1 0 - 5 /HPF    RBC Urine 2 0 - 2 /HPF    Bacteria Urine Few (A) NEG^Negative /HPF    Squamous Epithelial /HPF Urine <1 0 - 1 /HPF    Mucous Urine Present (A) NEG^Negative /LPF    Hyaline Casts 21 (H) 0 - 2 /LPF   Comprehensive metabolic panel   Result Value Ref Range    Sodium 136 133 - 144 mmol/L    Potassium 4.3 3.4 - 5.3 mmol/L    Chloride 108 94 - 109 mmol/L    Carbon Dioxide 23 20 - 32 mmol/L    Anion Gap 5 3 - 14 mmol/L    Glucose 84 70 - 99 mg/dL    Urea Nitrogen 28 7 - 30 mg/dL    Creatinine 1.56 (H) 0.66 - 1.25 mg/dL    GFR Estimate 39 (L) >60 mL/min/[1.73_m2]    GFR Estimate If Black 45 (L) >60 mL/min/[1.73_m2]    Calcium 9.0 8.5 - 10.1 mg/dL    Bilirubin Total 0.8 0.2 - 1.3 mg/dL    Albumin 3.6 3.4 - 5.0 g/dL    Protein Total 7.9 6.8 - 8.8 g/dL    Alkaline Phosphatase 71 40 - 150 U/L    ALT 22 0 - 70 U/L    AST 20 0 - 45 U/L   CBC with platelets   Result Value Ref Range    WBC 6.4 4.0 - 11.0 10e9/L    RBC Count 4.38 (L) 4.4 - 5.9 10e12/L    Hemoglobin 13.4 13.3 - 17.7 g/dL    Hematocrit 41.5 40.0 - 53.0 %    MCV 95 78 - 100 fl    MCH 30.6 26.5 - 33.0 pg    MCHC 32.3 31.5 - 36.5 g/dL    RDW 14.9 10.0 - 15.0 %    Platelet Count 161 150 - 450 10e9/L

## 2021-06-24 NOTE — PROGRESS NOTES
Pt and family met with SW regarding signing up to  Hospice.   Pt and family refused. Pt wants to leave.     MD notified. AMA form signed by patient and daughter. Pt discharged home with family.

## 2021-06-24 NOTE — ED NOTES
Found patient sitting on edge of bed. Patient stated he walked to the bathroom to urinate and have a BM. Patient took gown and monitors off. Difficulty keep things on patient, very restless. Bed alarm placed.

## 2021-06-24 NOTE — PROGRESS NOTES
Care Management Discharge Note    Discharge Date: 06/24/21     Discharge Disposition:  Home     Discharge Services:  None, patients and daughter refused hospice support.     Discharge Transportation:  Family    Private pay costs discussed: Not applicable    PAS Confirmation Code:  N/A  Patient/family educated on Medicare website which has current facility and service quality ratings:  N/A    Education Provided on the Discharge Plan:  Yes  Persons Notified of Discharge Plans: Patient, daughters Meche and Genie  Patient/Family in Agreement with the Plan:  Yes    Handoff Referral Completed: No    Additional Information:  SW met with patient and daughters Meche and Genie to further discuss discharge planning/hospice. After discussion, pt and daughters ultimately refused hospice support and 24/7 supervision. Updated [facility] liaison. Pt and daughter Meche signed AMA paperwork.     Online [facility] Report filed. Report #: [id number].     BELINDA Hernadez

## 2021-06-24 NOTE — PROGRESS NOTES
XC    Called for increasing confusion/agitation.  Picking at IV, refusing tele, unsettled and keeps getting out of bed    QTc 400    Prn haloperidol ordered

## 2021-06-24 NOTE — CONSULTS
Care Management Initial Consult    General Information  Assessment completed with: Patient, Children, Daughter Meche  Type of CM/SW Visit: Offer D/C Planning    Primary Care Provider verified and updated as needed: Yes   Readmission within the last 30 days:     Reason for Consult: discharge planning  Advance Care Planning:          Communication Assessment  Patient's communication style: spoken language (English or Bilingual)    Hearing Difficulty or Deaf: yes   Wear Glasses or Blind: yes    Cognitive  Cognitive/Neuro/Behavioral: .WDL except  Level of Consciousness: confused  Arousal Level: opens eyes spontaneously  Orientation: disoriented to, place, time, situation  Mood/Behavior: restless  Best Language: 0 - No aphasia  Speech: spontaneous, clear    Living Environment:   People in home: alone     Current living Arrangements: house      Able to return to prior arrangements: yes     Family/Social Support:  Care provided by: self  Provides care for: no one, unable/limited ability to care for self  Marital Status:   Children          Description of Support System: Supportive, Involved    Support Assessment: Adequate family and caregiver support, Adequate social supports    Current Resources:   Patient receiving home care services: No  Community Resources: None  Equipment currently used at home: none    Employment/Financial:  Employment Status: retired        Financial Concerns: insurance, none   Referral to Financial Counselor: No     Lifestyle & Psychosocial Needs:     Socioeconomic History     Marital status:      Spouse name: Not on file     Number of children: Not on file     Years of education: Not on file     Highest education level: Not on file     Tobacco Use     Smoking status: Former Smoker     Smokeless tobacco: Never Used     Functional Status:  Prior to admission patient needed assistance: Patient admitted under observation status for generalized weakness, hypertensive urgency. Pt has a Hx  of severe aortic stenosis. Patient lives alone in [place] with his dog.     SW spoke with pt's daughter Meche. She reports that pt is independent at baseline and ambulates without an assistive device. Daughter checks in on him 2-3x/week to assist with grocery shopping, paying bills, etc. She reports that he is able to eat, toilet, dress and bathe independently.     Dtr Meche interested in hospice at home for patient at discharge. Pt would not have 24/7 family support if he were to discharge home. Daughter feels it would be safe for patient to discharge home with family checking in on him regularly but not having 24/7 support.     Daughters Genie and Meche to meet at hospital at 2pm today.     Dtr did not have a preference of hospice agency. Agreeable to referral being sent to [facility]. CANDIDO emailed initial referral to [facility] to follow.     Mental Health Status: Disoriented        Chemical Dependency Status: N/A        Values/Beliefs:  Spiritual, Cultural Beliefs, Christianity Practices, Values that affect care: yes             Additional Information:  Plan: Hospice at discharge. Family to be here at 2pm today for meeting with CANDIDO. Will update Palliative. Initial referral made to [facility]. Family hoping for discharge home. Will further discuss this afternoon.     BELINDA Hernadez

## 2021-06-24 NOTE — PROVIDER NOTIFICATION
Paged X cover: pt is confused and impulsive. Pt restless and constantly trying to get out of bed. Picking at IV, coban wrapped around IV and No-No in place. Pt took off tele and unwilling to put back on. Redirection unsuccessful.

## 2021-06-24 NOTE — PROGRESS NOTES
ROOM # 223    Living Situation (if not independent, order SW consult): Lives at home alone with dog  Facility name: N/A  : Cheli or Genie (daughters)    Activity level at baseline: independent  Activity level on admit: SBA / Ax1      Patient registered to observation; given Patient Bill of Rights; given the opportunity to ask questions about observation status and their plan of care.  Patient has been oriented to the observation room, bathroom and call light is in place.    Discussed discharge goals and expectations with patient/family.

## 2021-06-24 NOTE — PROGRESS NOTES
ROOM # 223    Living Situation (if not independent, order SW consult): Home alone with dog  Facility name:  : [facility]ie, [facility]    Activity level at baseline: Ind  Activity level on admit: Assist of 1      Patient registered to observation; given Patient Bill of Rights; given the opportunity to ask questions about observation status and their plan of care.  Patient has been oriented to the observation room, bathroom and call light is in place.    Discussed discharge goals and expectations with patient/family.

## 2021-06-24 NOTE — PROGRESS NOTES
Pt impulsive and very fast with tranfers/moving. Pt pulled out IV and crossed over bed rails at foot of bed. Unable to place IV x3 and give prn IV haldol. Verbal redirection minimally helpful. Lavender patch and warm blankets given. On Ipad for frequent monitoring for safety. Paged flyer to place IV.

## 2021-06-24 NOTE — PLAN OF CARE
"PRIMARY DIAGNOSIS: GENERALIZED WEAKNESS    OUTPATIENT/OBSERVATION GOALS TO BE MET BEFORE DISCHARGE  1. Orthostatic performed: No    2. Tolerating PO medications:  N/A    3. Return to near baseline physical activity: No    4. Cleared for discharge by consultants (if involved): No    Discharge Planner Nurse   Safe discharge environment identified: No  Barriers to discharge: Yes       Entered by: Afshan Arias 06/24/2021       .BP (!) 147/52   Pulse 62   Temp 95.6  F (35.3  C) (Axillary)   Resp 20   Ht 1.676 m (5' 6\")   Wt 67.5 kg (148 lb 12.8 oz)   SpO2 98%   BMI 24.02 kg/m     Pt has been restless and confused. Pt is redirectable. VSS. Pt took out his PIV that was wrapped in coban. Writer and preceptor attempted new PIV access with no success. PT and OT to consult.    Please review provider order for any additional goals.   Nurse to notify provider when observation goals have been met and patient is ready for discharge.  "

## 2021-06-24 NOTE — PLAN OF CARE
PRIMARY DIAGNOSIS: GENERALIZED WEAKNESS    OUTPATIENT/OBSERVATION GOALS TO BE MET BEFORE DISCHARGE  1. Orthostatic performed: No    2. Tolerating PO medications: Yes    3. Return to near baseline physical activity:  No. Sitter at bedside.     4. Cleared for discharge by consultants (if involved): No    Discharge Planner Nurse   Safe discharge environment identified:  Unknown at this time.   Barriers to discharge: Yes       Entered by: Berenice Hill 06/24/2021 1:37 PM    Pt alert but disoriented x3. Napping in bed today. Sitter at  bedside. Tolerating PO meds and diet. Up Assist of 1. Daughter spoke with MD today. Plan to meet with palliative regarding goals of care. No IV or tele. Will readdress this afternoon. Plan: Consults. Possible discharge this afternoon.     Please review provider order for any additional goals.   Nurse to notify provider when observation goals have been met and patient is ready for discharge.

## 2021-06-24 NOTE — H&P
[facility]    History and Physical - Hospitalist Service       Date of Admission:  6/23/2021    Assessment & Plan      Kofi Haddad is a 88 year old male admitted on 6/23/2021. He has a PMH most remarkable for severe aortic stenosis, seizure disorder, essential hypertension who presented via EMS with generalized weakness. He was admitted to observation for evaluation of weakness and treatment for hypertensive urgency.    1. Hypertensive Urgency with Intermittent Bradycardia. Presenting with blood pressures up to 230 mmHg systolic, with intermittent slowing of the heart rate to the 40 range. Have not caught a slow heart rate on telemetry. Does not appear patient is compliant with outpatient medication regimen--tells me he hasn't taken medications in over a year.  - Telemetry  - Status post hydralazine in the ER; will shoot for a BP of < 180 mmHg systolic tonight; PRN labetalol for SBP > 180 mmHg.    2. History of Severe Aortic Stenosis; appears this was diagnosed back in 2019 and he declined further pursuit of valve replacement. Patient did not have syncope, but the aortic stenosis is probably playing a role in his weakness.    3. Generalized Weakness. Likely a combination of deconditioning, the weather, and underlying pathologies (aortic stenosis).  - PT/OT    4. Medication Compliance? Concern for patient and medication compliance. Dilantin level was quite low, BP was extremely high, patient tells me he hasn't had medications in a year, but does have a detectable (although low) dilantin level.    Chronic Issues  1. Essential Hypertension - home lisinopril  2. History of seizure disorder - home dilantin     Diet:  General Diet  DVT Prophylaxis: Heparin SQ  Cortes Catheter: Not present  Central Lines: None  Code Status:   DNR/DNI discussed with patient    Risk Factors Present on Admission                   Disposition Plan   Expected discharge: 2 - 3 days, recommended to probably TCU;  pending eval once safe disposition plan/ TCU bed available.     The patient's care was discussed with the Bedside Nurse and Patient.    Julián De Paz MD  [facility]  Securely message with the Vocera Web Console (learn more here)  Text page via [employer] Paging/Directory      ______________________________________________________________________    Chief Complaint   Weakness, Elevated Blood Pressure    History is obtained from the patient    History of Present Illness   Kofi Haddad is a 88 year old male with a PMH most remarkable for severe aortic stenosis, seizure disorder, essential hypertension who presented via EMS with generalized weakness. He was admitted to observation for evaluation of weakness and treatment for hypertensive urgency.    Patient states he was walking home form the gas station where he bought grocies this afternoon. He reports getting tired and stopped under a tree to get some rest. He notes he was unable to get up and walk home and someone noticed he had been sitting there for 1.5 hours. EMS was called who noticed he was quite weak and brought him into the ER.    Per EMS he was shaky, but did not fall, hit head, or lose consciousness. Does note he hasn't been eating/drinking much. No other symptoms.    Review of Systems    The 10 point Review of Systems is negative other than noted in the HPI or here.    Past Medical History    I have reviewed this patient's medical history and updated it with pertinent information if needed.   Past Medical History:   Diagnosis Date     Cancer (H)      High cholesterol      Hypertension      Seizures (H)        Past Surgical History   I have reviewed this patient's surgical history and updated it with pertinent information if needed.  Past Surgical History:   Procedure Laterality Date     GENITOURINARY SURGERY         Social History   I have reviewed this patient's social history and updated it with pertinent information if  needed.  Social History     Tobacco Use     Smoking status: Former Smoker     Smokeless tobacco: Never Used   Substance Use Topics     Alcohol use: Not on file     Drug use: Not on file       Family History         Prior to Admission Medications   Prior to Admission Medications   Prescriptions Last Dose Informant Patient Reported? Taking?   acetaminophen (TYLENOL) 325 MG tablet   No No   Sig: Take 1-2 tablets (325-650 mg) by mouth every 4 hours as needed for mild pain   amoxicillin-clavulanate (AUGMENTIN) 875-125 MG tablet   No No   Sig: Take 1 tablet by mouth 2 times daily with first dose Sunday, 11/17 morning.   calcium carbonate (TUMS) 500 MG chewable tablet   Yes No   Sig: Take 1 chew tab by mouth daily as needed for heartburn   guaiFENesin (ROBITUSSIN) 20 mg/mL SOLN solution   No No   Sig: Take 10 mLs by mouth every 4 hours as needed for cough   lisinopril (PRINIVIL/ZESTRIL) 10 MG tablet   Yes No   Sig: Take 10 mg by mouth every morning    phenytoin (DILANTIN) 100 MG CR capsule   Yes No   Sig: Take 100 mg by mouth 3 times daily       Facility-Administered Medications: None     Allergies   No Known Allergies    Physical Exam   Vital Signs: Temp: 97.6  F (36.4  C) Temp src: Oral BP: (!) 182/84 Pulse: 87   Resp: 13 SpO2: 96 % O2 Device: None (Room air)    Weight: 0 lbs 0 oz    General Appearance: Well appearing, no distress  Eyes: NICOLLE, EOMI  HEENT: NC, AT. MMM.   Respiratory: CTAB, normal work of breathing  Cardiovascular: Regular Rate and Rhythm, Loud systolic murmur. No rubs, gallops  GI: Soft, non-tender, non-distended  Lymph/Hematologic: No asymetric swelling, edema. No bruising.  Genitourinary: Deferred  Skin: No rashes, lesions, wounds.  Musculoskeletal: Warm, well perfused  Neurologic: AOx4, CNII-XII intact.  Psychiatric: Euthymic. Mood appropriate.     Data   Data reviewed today: I reviewed all medications, new labs and imaging results over the last 24 hours. I personally reviewed the EKG tracing showing  sinus with a rate of 47.    Recent Labs   Lab 06/23/21 2002 06/23/21  1758   WBC  --  4.9   HGB  --  13.1*   MCV  --  95   PLT  --  150   NA  --  142   POTASSIUM  --  3.6   CHLORIDE  --  115*   CO2  --  21   BUN  --  27   CR  --  1.46*   ANIONGAP  --  6   BRUCE  --  7.5*   GLC  --  88   TROPI 0.031 0.031

## 2021-06-24 NOTE — PROVIDER NOTIFICATION
11:29 PM  Provider notified: X cover  Reason: do you want tele ordered for pt? Pt admitted for hypertension urgency with intermittent bradycardia and weakness.  Order: Dr. Almodovar ordered tele.

## 2021-06-24 NOTE — PLAN OF CARE
PRIMARY DIAGNOSIS: GENERALIZED WEAKNESS    OUTPATIENT/OBSERVATION GOALS TO BE MET BEFORE DISCHARGE  1. Orthostatic performed: No    2. Tolerating PO medications: Yes    3. Return to near baseline physical activity:  No. Sitter at bedside.     4. Cleared for discharge by consultants (if involved): No    Discharge Planner Nurse   Safe discharge environment identified:  Unknown at this time.   Barriers to discharge: Yes       Entered by: Berenice Hill 06/24/2021 9:14 AM    Pt alert but disoriented x3. Sitting in the chair. Sitter at  bedside. Tolerating PO meds and diet. Up Assist of 1. Daughter spoke with MD today. Plan to meet with palliative regarding goals of care. No IV or tele. Will readdress this afternoon. Plan: Consults. Possible discharge this afternoon.     Please review provider order for any additional goals.   Nurse to notify provider when observation goals have been met and patient is ready for discharge.

## 2021-06-24 NOTE — PHARMACY-ADMISSION MEDICATION HISTORY
Admission medication history interview status for this patient is complete. See [facility] admission navigator for allergy information, prior to admission medications and immunization status.     Medication history interview source(s):Patient  Medication history resources (including written lists, pill bottles, clinic record):Shreyas Castillo  Primary pharmacy:----    Changes made to PTA medication list:  Added: -----  Deleted: prn tylenol from 2019, augmentin from 2019, tums prn from 2018, robitussin prn from 2019, lisinopril 10mg daily from 2017, phenytoin 100mg tid from 2017  Changed: -----    Actions taken by pharmacist (provider contacted, etc):spoke to pt, he states he does NOT take any prescription medications or over the counter supplements.  Asked pt if he takes a medication for seizures-pt states he doesn't and hasn't had seizures     Additional medication history information:PA updated that all meds on PTA med list will be deleted per pt report    Medication reconciliation/reorder completed by provider prior to medication history? No      Prior to Admission medications    Not on File

## 2021-06-24 NOTE — PLAN OF CARE
PRIMARY DIAGNOSIS: GENERALIZED WEAKNESS    OUTPATIENT/OBSERVATION GOALS TO BE MET BEFORE DISCHARGE  1. Orthostatic performed: No    2. Tolerating PO medications: N/A    3. Return to near baseline physical activity: No    4. Cleared for discharge by consultants (if involved): No    Discharge Planner Nurse   Safe discharge environment identified: No  Barriers to discharge: Yes       Entered by: Afshan Arias 06/24/2021     Pt alert to self. Pt is [place], declines hear amplifying aids. SL in R arm. Pt was able to ambulate from transport cart to bed. Pt does follow instructions and is cooperative as of right now.   .Vital signs:  Temp: 95.6  F (35.3  C) Temp src: Axillary BP: (!) 147/52 Pulse: 62   Resp: 20 SpO2: 98 % O2 Device: None (Room air)         Please review provider order for any additional goals.   Nurse to notify provider when observation goals have been met and patient is ready for discharge.

## 2021-06-24 NOTE — ED NOTES
[facility]  ED Nurse Handoff Report    Kofi Haddad is a 88 year old male   ED Chief complaint: Generalized Weakness  . ED Diagnosis:   Final diagnoses:   None     Allergies: No Known Allergies    Code Status: DNR / DNI  Activity level - Baseline/Home:  Independent. Activity Level - Current:   Stand by Assist. Lift room needed: No. Bariatric: No   Needed: No   Isolation: No. Infection: Not Applicable.     Vital Signs:   Vitals:    06/23/21 2041 06/23/21 2045 06/23/21 2115 06/23/21 2130   BP: (!) 210/109 (!) 204/70  (!) 182/84   Pulse:  75 50 87   Resp:    13   Temp:       TempSrc:       SpO2:  96% 96%        Cardiac Rhythm:  ,      Pain level:    Patient confused: Yes. Patient Falls Risk: Yes.   Elimination Status: Has voided   Patient Report - Initial Complaint: Patient presents via ambulance with generalized weakness. Patient was walking from his residence to [facility] about 7-8 blocks away. On his way back from [facility] he stop under a tree to get some rest. A bystander called 911 as patient was sitting outside for about an 1.5 hours. Upon EMS arrival patient reported his legs were too weak to get up. EMS was able to stand and assist him to the stretcher, he was a bit shaky in his feet. A&Ox4.   Focused Assessment: Musculoskeletal - Musculoskeletal WDL: .WDL except  General Mobility: generalized weakness   Cardiac - Cardiac WDL: .WDL except; rhythm  Cardiac Rhythm:  (fluctuates from SB in the 40's to NSR in the 60's)  Cardiac Monitoring - EKG Monitoring: Yes  Cardiac Regularity: Regular   Cognitive - Cognitive/Neuro/Behavioral WDL: .WDL except  Level of Consciousness: confused (hx dementia) Follows Commands: inconsistent  Mood/Behavior: restless   Tests Performed: 12 lead EKG, Labs, COVID  Abnormal Results:   Abnormal Labs Reviewed   PHENYTOIN LEVEL - Abnormal; Notable for the following components:       Result Value    Phenytoin Level 0.4 (*)     All other components within normal  limits   CBC WITH PLATELETS DIFFERENTIAL - Abnormal; Notable for the following components:    RBC Count 4.20 (*)     Hemoglobin 13.1 (*)     Hematocrit 39.7 (*)     All other components within normal limits   BASIC METABOLIC PANEL - Abnormal; Notable for the following components:    Chloride 115 (*)     Creatinine 1.46 (*)     GFR Estimate 42 (*)     GFR Estimate If Black 49 (*)     Calcium 7.5 (*)     All other components within normal limits   ISTAT  GASES LACTATE FABRICIO POCT - Abnormal; Notable for the following components:    PO2 Venous 18 (*)     Lactic Acid 0.4 (*)     All other components within normal limits     Treatments provided: 500cc NS bolus, hydralazine  Family Comments: Daughters updated via phone.  OBS brochure/video discussed/provided to patient:  No -  Patient has dementia   ED Medications:   Medications   0.9% sodium chloride BOLUS (0 mLs Intravenous Stopped 6/1935)   hydrALAZINE (APRESOLINE) injection 10 mg (10 mg Intravenous Given 6/23/21 2041)     Drips infusing:  No  For the majority of the shift, the patient's behavior Yellow. Interventions performed were N/A.    Sepsis treatment initiated: No     Patient tested for COVID 19 prior to admission: YES    ED Nurse Name/Phone Number: Arlette Alvarez RN,   10:18 PM      .RECEIVING UNIT ED HANDOFF REVIEW    Above ED Nurse Handoff Report was reviewed: Yes  Reviewed by: Afshan Arias RN on June 23, 2021 at 10:59 PM

## 2021-06-28 NOTE — DISCHARGE SUMMARY
[facility]    Discharge Summary  Hospitalist    Date of Admission:  6/23/2021  Date of Discharge:  6/24/2021  2:40 PM AGAINST MEDICAL ADVICE  Discharging Provider: Gail Simon PA-C  Date of Service (when I saw the patient): 6/24/2021    Discharge Diagnoses   AMA discharge  Medical Noncomplaince  Hypertensive Crisis  Bradycardia  Generalized Weakness  Severe Aortic Stenosis  Cognitive Impairment, Suspected Mild-Moderate Dementia      History of Present Illness   Kofi Haddad is a 88 year old male with a PMH most remarkable for severe aortic stenosis, seizure disorder, essential hypertension who presented via EMS with generalized weakness. He was admitted to observation for evaluation of weakness and treatment for hypertensive urgency.     Patient states he was walking home form the gas station where he bought grocies this afternoon. He reports getting tired and stopped under a tree to get some rest. He notes he was unable to get up and walk home and someone noticed he had been sitting there for 1.5 hours. EMS was called who noticed he was quite weak and brought him into the ER.     Per EMS he was shaky, but did not fall, hit head, or lose consciousness. Does note he hasn't been eating/drinking much. No other symptoms.     Please see admitting H & P  by Julián Franklin MD,  on 6/23/2021 for full details of the encounter.     Hospital Course   Kofi Haddad was admitted on 6/23/2021.  The following problems were addressed during his hospitalization:    1. Hypertensive Crisis   Bradycardia. Presenting with blood pressures up to 230 mmHg systolic, with intermittent slowing of the heart rate to the 40 range in the ED.  HTN Crisis secondary to untreated HTN.   -Treated with hydralazine in the emergency department with improvement in blood pressures.  -Discharged AMA in the care of daughters, Meche and Genie.  Refused hospice support and 24/7 supervision [facility] report filed       2. History  "of Severe Aortic Stenosis; appears this was diagnosed back in 2019 and he declined further pursuit of valve replacement.  Hospice initially discussed with daughters during admission, later in the day discharged AMA in the care of Daniela guillen.  Refused hospice support and 24/7 supervision [facility] report filed.      3. Generalized Weakness. Likely a combination of deconditioning, the weather, and underlying pathologies (aortic stenosis).       4. Medication Compliance? Patient does not want to take medications to manage chronic illness.  He would not like further hospitalizations or medical care.  He reportedly does not take home medications.  Daughters are understanding of the risks of these wishes.      Chronic Issues  1. Essential Hypertension   2. History of seizure disorder   3. Cognitive Impairment, Dementia: Mild - moderate    Pending Results   None.    Code Status   DNR / DNI       Primary Care Physician   [facility]        BP (!) 144/75 (BP Location: Right arm)   Pulse 77   Temp 97.9  F (36.6  C) (Oral)   Resp 18   Ht 1.676 m (5' 6\")   Wt 67.5 kg (148 lb 12.8 oz)   SpO2 98%   BMI 24.02 kg/m      Constitutional: Awake, alert, no apparent distress  Respiratory:  Normal work of breathing. Lungs clear to auscultation bilaterally, no crackles or wheezing.  Cardiovascular: Regular rate and rhythm, normal S1 and S2, systolic murmur  GI: Bowel sounds present. soft, non-distended, non-tender.   Skin/Integument: Warm, dry. no peripheral edema.  Neuro: Oriented to self only. No focal deficits. Moving all extremities with normal strength. Coordination and sensation grossly intact. Speech clear.   Psych: Appropriate affect.        Discharge Disposition   Discharged to home in the care of CORRY guillen  Condition at discharge: Guarded    Consultations This Hospital Stay   PHYSICAL THERAPY ADULT IP CONSULT  OCCUPATIONAL THERAPY ADULT IP CONSULT  SOCIAL WORK IP CONSULT  PALLIATIVE CARE ADULT IP " CONSULT  CARE MANAGEMENT / SOCIAL WORK IP CONSULT    Time Spent on this Encounter   IGail PA-C, personally saw the patient today and spent greater than 30 minutes discharging this patient.    Discharge Orders     Discharge Medications   There are no discharge medications for this patient.    Allergies   No Known Allergies  Data   Most Recent 3 CBC's:  Recent Labs   Lab Test 06/24/21  0800 06/23/21  1758 11/16/19  0606   WBC 6.4 4.9 4.1   HGB 13.4 13.1* 11.8*   MCV 95 95 98    150 88*      Most Recent 3 BMP's:  Recent Labs   Lab Test 06/24/21  0800 06/23/21  1758 11/16/19  0606    142 139   POTASSIUM 4.3 3.6 4.7   CHLORIDE 108 115* 110*   CO2 23 21 24   BUN 28 27 39*   CR 1.56* 1.46* 1.49*   ANIONGAP 5 6 5   BRUCE 9.0 7.5* 8.0*   GLC 84 88 88     Most Recent 2 LFT's:  Recent Labs   Lab Test 06/24/21  0800 11/15/19  1950   AST 20 42   ALT 22 19   ALKPHOS 71 108   BILITOTAL 0.8 0.4     Most Recent INR's and Anticoagulation Dosing History:  Anticoagulation Dose History     Recent Dosing and Labs Latest Ref Rng & Units 4/3/2006    INR 0.86 - 1.14 1.13        Most Recent 3 Troponin's:  Recent Labs   Lab Test 06/23/21 2002 06/23/21  1758 11/16/19  0606   TROPI 0.031 0.031 0.161*     Most Recent Cholesterol Panel:  Recent Labs   Lab Test 10/01/18  0614   CHOL 149   LDL 72   HDL 44   TRIG 165*     Most Recent 6 Bacteria Isolates From Any Culture (See EPIC Reports for Culture Details):  Recent Labs   Lab Test 11/15/19  2012 11/15/19  1950   CULT No growth No growth     Most Recent TSH, T4 and A1c Labs:No lab results found.  Results for orders placed or performed during the hospital encounter of 11/15/19   Chest XR,  PA & LAT    Narrative    EXAM: XR CHEST 2 VW  LOCATION: [facility]  DATE/TIME: 11/15/2019 8:28 PM    INDICATION: Altered mental status, febrile  COMPARISON: 07/01/2019      Impression    IMPRESSION: Normal heart size. Prominent opacity in the right paratracheal region  similar to prior exam and is seen as vasculature on prior chest CT. No pneumothorax or pleural effusion. Ununited left posterior seventh rib fracture. Lungs are clear.   Head CT w/o contrast    Narrative    EXAM: CT HEAD W/O CONTRAST  LOCATION: [facility]  DATE/TIME: 11/15/2019 8:20 PM    INDICATION: Confusion  COMPARISON: CT head 04/03/2006.  TECHNIQUE: Routine without IV contrast. Multiplanar reformats. Dose reduction techniques were used.    FINDINGS:  INTRACRANIAL CONTENTS: No intracranial hemorrhage, extraaxial collection, or mass effect.  No CT evidence of acute infarct. Mild presumed chronic small vessel ischemic changes. Moderate generalized volume loss. No hydrocephalus.     VISUALIZED ORBITS/SINUSES/MASTOIDS: No intraorbital abnormality. Mild mucosal thickening scattered about the paranasal sinuses. No middle ear or mastoid effusion. Debris in the external auditory canals.    BONES/SOFT TISSUES: No acute abnormality.      Impression    IMPRESSION:  1.  No acute intracranial pathology.  2.  Increased mild chronic small vessel ischemic disease and moderate generalized parenchymal volume loss since 04/03/2006.   CT Chest w/o Contrast    Narrative    CT CHEST WITHOUT CONTRAST November 16, 2019 9:55 AM    HISTORY: Hypoxia, fever, evaluate for infiltrate.    TECHNIQUE: Scans obtained from the apices through the diaphragm  without IV contrast. Radiation dose for this scan was reduced using  automated exposure control, adjustment of the mA and/or kV according  to patient size, or iterative reconstruction technique.    COMPARISON: CT chest 9/29/2011.    FINDINGS:    Lungs: There are multiple regions of micronodular new opacity seen  within the bilateral lungs. This is dominantly identified at the lower  lobes, but other lobes are also involved. For example, one of the  nodular opacities at the left lower lobe measures 1 cm series 5 image  161. An example focal region within the posterior right lower  lobe is  0.9 cm series 5 image 140. There are other examples as well with  adjacent multiple satellite nodules.    Additional findings: No acute mediastinal abnormality within the  limits of unenhanced scanning. Diffuse thoracic aortic and coronary  artery calcifications. Small hiatal hernia again identified. Multiple  granulomatous calcified lymph nodes. Upper abdomen images shows severe  atrophy of the left kidney progressed since 2011.      Impression    IMPRESSION:  1. Multifocal bilateral clustered nodular airspace disease newly  identified. The morphology suggests an infectious or inflammatory  cause. Neoplastic etiology is not entirely excluded. Recommend  follow-up CT chest in three to six months for surveillance.  2. Diffuse thoracic aortic and coronary artery calcifications.  3. Severe left renal atrophy.    MD Gail SIMONS PA-C  [facility]

## 2021-07-07 PROBLEM — Z78.9 UNABLE TO CARE FOR SELF: Status: ACTIVE | Noted: 2021-01-01

## 2021-07-07 PROBLEM — R29.6 FALLS FREQUENTLY: Status: ACTIVE | Noted: 2021-01-01

## 2021-07-07 NOTE — ED NOTES
[facility]  ED Nurse Handoff Report    Kofi Haddad is a 88 year old male   ED Chief complaint: Fall  . ED Diagnosis:   Final diagnoses:   Falls frequently   Unable to care for self     Allergies: No Known Allergies    Code Status: DNR / DNI  Activity level - Baseline/Home:  Assist X 1. Activity Level - Current:   Assist X 1. Lift room needed: No. Bariatric: No   Needed: No   Isolation: No. Infection: Not Applicable.     Vital Signs:   Vitals:    07/07/21 1331 07/07/21 1400 07/07/21 1515 07/07/21 1600   BP: (!) 131/96 (!) 141/59 (!) 180/69    Pulse: 76 60     Resp: 18      Temp: 97.9  F (36.6  C)      TempSrc: Oral      SpO2: 97% 98% 99% 96%   Weight: 65 kg (143 lb 3.2 oz)          Cardiac Rhythm:  ,      Pain level:    Patient confused: Yes. Patient Falls Risk: Yes.   Elimination Status: Has voided   Patient Report - Initial Complaint: multiple falls . Focused Assessment: Pt arrives via EMS after multiple falls today. EMS reports pt lives alone. EMS reports that neighbors saw pt outside, lying in the grass this morning. Neighbors helped pt up, pt stated he felt okay. Neighbors set pt in chair and let. Minutes later, neighbors then find pt outside again, lying on the ground. Neighbors helped pt up and noticed pt was not steady on his feet, neighbors called EMS. Pt has h/o dementia, pt is still makes own medical decisions. EMS reports daughters are trying to become POA. Pt reports back pain, no other symptoms. EMS reports pt did want to come to hospital, so EMS got a hold on pt via police. Pt is alert, oriented to name and place. VSS. ABC's intact.    Tests Performed: labs, imaging. Abnormal Results:   Labs Ordered and Resulted from Time of ED Arrival Up to the Time of Departure from the ED   CBC WITH PLATELETS DIFFERENTIAL - Abnormal; Notable for the following components:       Result Value    WBC 3.8 (*)     RBC Count 4.02 (*)     Hemoglobin 12.9 (*)     Hematocrit 39.1 (*)      Platelet Count 143 (*)     All other components within normal limits   COMPREHENSIVE METABOLIC PANEL - Abnormal; Notable for the following components:    Chloride 111 (*)     Glucose 126 (*)     Urea Nitrogen 34 (*)     Creatinine 1.96 (*)     GFR Estimate 30 (*)     GFR Estimate If Black 34 (*)     All other components within normal limits   MAGNESIUM - Abnormal; Notable for the following components:    Magnesium 2.4 (*)     All other components within normal limits   INR   LACTIC ACID WHOLE BLOOD   SARS-COV-2 (COVID-19) VIRUS RT-PCR   ROUTINE UA WITH MICROSCOPIC REFLEX TO CULTURE   PULSE OXIMETRY NURSING   CARDIAC CONTINUOUS MONITORING   PERIPHERAL IV CATHETER   DOCUMENT     CT Head w/o Contrast   Final Result   IMPRESSION: No acute intracranial abnormality.      NESS HERNANDEZ MD            SYSTEM ID:  [id number]        .   Treatments provided: N/A  Family Comments: Genie [phone], Meche [phone]. Daughters have been asked to come in and sit with pt, hoping for more information on pt, but daughter, Genie, stated she does not have a car.   OBS brochure/video discussed/provided to patient:  Yes  ED Medications:   Medications   lidocaine 1 % 0.1-1 mL (has no administration in time range)   lidocaine (LMX4) cream (has no administration in time range)   sodium chloride (PF) 0.9% PF flush 3 mL (has no administration in time range)   sodium chloride (PF) 0.9% PF flush 3 mL (has no administration in time range)   haloperidol lactate (HALDOL) injection 5 mg (5 mg Intramuscular Given 7/7/21 1614)     Drips infusing:  No  For the majority of the shift, the patient's behavior Green. Interventions performed were N/A.    Sepsis treatment initiated: No     Patient tested for COVID 19 prior to admission: YES    ED Nurse Name/Phone Number: Daniella Holman RN,   3:01 PM  RECEIVING UNIT ED HANDOFF REVIEW    Above ED Nurse Handoff Report was reviewed: Yes  Reviewed by: Karen M. Lesch, RN on July 7, 2021 at 4:05 PM

## 2021-07-07 NOTE — PLAN OF CARE
ROOM # 221    Living Situation  Lives independently in the community, however is at great risk due to dementia  Facility name:  :  [facility]   [phone]    Activity level at baseline: indep  Activity level on admit: assist of one, sitter      Patient registered to observation; given Patient Bill of Rights; given the opportunity to ask questions about observation status and their plan of care.  Patient has been oriented to the observation room, bathroom and call light is in place.    Discussed discharge goals and expectations with patient/family.

## 2021-07-07 NOTE — ED TRIAGE NOTES
Pt arrives via EMS after multiple falls today. EMS reports pt lives alone. EMS reports that neighbors saw pt outside, lying in the grass this morning. Neighbors helped pt up, pt stated he felt okay. Neighbors set pt in chair and let. Minutes later, neighbors then find pt outside again, lying on the ground. Neighbors helped pt up and noticed pt was not steady on his feet, neighbors called EMS. Pt has h/o dementia, pt is still makes own medical decisions. EMS reports daughters are trying to become POA. Pt reports back pain, no other symptoms. EMS reports pt did want to come to hospital, so EMS got a hold on pt via police. Pt is alert, oriented to name and place. VSS. ABC's intact.

## 2021-07-07 NOTE — ED PROVIDER NOTES
History   Chief Complaint:  Fall     HPI   Kofi Haddad is a 88 year old male with a history of dementia, hypertension, hyperlipidemia, severe aortic stenosis and generalized weakness who presents via EMS for evaluation after unwitnessed falls. Per EMS, the patient was found laying on the grass outside this morning by neighbors. Neighbors helped him up and the patient stated that he was okay. However, minutes later, they found him on the ground again and noticed that he was unsteady so they called EMS. Per EMS, the patient did not want to come to the hospital and they had to place the patient on a hold via police. Per EMS, the patient reported back pain but he denies any complaints on arrival including back pain, chest pain, abdominal pain, headache, fever, or chills. He endorses that he does not want to be here and he was simply picking up garbage in his lawn. The patient lives alone in a private home with his dog. He has dementia but is his own medical decision maker. Reportedly, his daughters are trying to become his POA. The patient was admitted 06/23 for similar story of generalized weakness.  His daughters have previously expressed that they are concerned for his safety but are unable to provide 24/7 care and are hesitant to move him into LTC or hospice facility. There were conversations surrounding hospice/goals of care (see palliative care note from 06/23/21). He ultimately left AMA into the care of one of his daughters without much of a safety plan in place.     Review of Systems   Constitutional: Negative for chills and fever.   Cardiovascular: Negative for chest pain.   Gastrointestinal: Negative for abdominal pain.   Musculoskeletal: Negative for back pain.   Neurological: Negative for headaches.   Psychiatric/Behavioral: Positive for confusion.   All other systems reviewed and are negative.        Allergies:  The patient has no known allergies.     Medications:  The patient is not currently  taking any prescribed medications.    Past Medical History:    Hyperlipidemia   Hypertension   Seizures   Generalized weakness  Hypertensive urgency   Community acquire pneumonia   Syncope and collapse  Severe aortic stenosis   Prostate cancer  CKD stage 3   Bradycardia   Basal cell cancer  GERD     Past Surgical History:    Orchiectomy      Family History:    Arthritis - daughter    Social History:  Presents to the ED: via EMS  Patient lives alone.   PCP: [facility]  Former Smoker    Physical Exam     Patient Vitals for the past 24 hrs:   BP Temp Temp src Pulse Resp SpO2 Weight   07/07/21 1600 -- -- -- -- -- 96 % --   07/07/21 1515 (!) 180/69 -- -- -- -- 99 % --   07/07/21 1400 (!) 141/59 -- -- 60 -- 98 % --   07/07/21 1331 (!) 131/96 97.9  F (36.6  C) Oral 76 18 97 % 65 kg (143 lb 3.2 oz)         GEN- alert, cooperative  HEENT- atraumatic, PERRL, EOMI, MMM, oral pharynx without abnormalities, no dental injuries, midface stable, TM's clear bilaterally  NECK- ROM, soft, supple, no midline C spine tenderness to palpation, no abrasions  RESP- CTAB, no w/r/r, chest wall nontender, no crepitus, symmetrical chest wall movement  CV- RRR, no m/r/g  ABD- soft, NT/ND, +BS  MSK- normal ROM in all extremities, pelvis stable to AP and lateral compression, no T and L spinal tenderness in the midline, 5/5 strength in all extremities  NEURO- GCS 15, speech normal, alert, 5/5 strength x 4, sensation to light touch intact in all extremities,  strong bilaterally  SKIN- no rash, no bruising  PSYCH- normal mood, oriented to self and place    Emergency Department Course     ECG:  ECG taken at 1346  Normal sinus rhythm  RBBB  Abnormal ECG  Rate 69 bpm. KY interval 182 ms. QRS duration 134 ms. QT/QTc 462/495 ms. P-R-T axes 20 -29 19.    Imaging:  CT Head without Contrast  No acute intracranial abnormality.     Reading per radiology.    Laboratory:  CBC: WBC: 3.8 (L), HGB: 12.9 (L), PLT: 143 (L)    CMP: Glucose 126 (H),  Chloride: 111 (H), Urea Nitrogen: 34 (H), Creatinine: 1.96 (H), GFR: 30 (L), o/w WNL    Lactic acid (Resulted 1440): 1.7    INR: 1.05    Magnesium: 2.4 (H)    UA:  Negative    Asymptomatic COVID-19 PCR: Pending     Emergency Department Course:    Reviewed:  I reviewed the patient's nursing notes, vitals and past medical history.     Assessments:  1330 I performed an exam of the patient in room ED22 as documented above.  1600 I updated the patient on results and discussed plan of care.    Consults:    1530 I spoke with Neha Aggarwal PA-C of the hospitalist service regarding patient's presentation, findings, and plan of care.    Disposition:  The patient was admitted to the hospital under the care of Neha Aggarwal PA-C.     Impression & Plan     CMS Diagnoses: None    Medical Decision Making:  Kofi Haddad is a 88 year old male who presents with a fall after being found outside his house by a neighbor outside his house. The patient was previously admitted 2 weeks ago for the same thing. It is unclear who the decision maker is at this point because the patient obviously has dementia, has trouble making decisions, and does not seem to understand the situation. We discussed with the daughter who stated that they are not able to care for him and do not feel he is safe for discharge. I also agree with that. The patient does need to be admitted for placement. The patient at times is confused and unable to give consistent history. He does not want to be admitted. However, given his dementia and concern for safety, he is placed on a 72 hour hold. He will need a psych consult as well. The patient is admitted to observation for further placement evaluation.     Covid-19  Kofi Haddad was evaluated during a global COVID-19 pandemic, which necessitated consideration that the patient might be at risk for infection with the SARS-CoV-2 virus that causes COVID-19.   Applicable protocols for evaluation were followed  during the patient's care.   COVID-19 was considered as part of the patient's evaluation. The plan for testing is:  a test was obtained during this visit.    Diagnosis:    ICD-10-CM    1. Falls frequently  R29.6 Asymptomatic SARS-CoV-2 COVID-19 Virus (Coronavirus) by PCR   2. Unable to care for self  Z78.9      Scribe Disclosure:  I, Viktoria Wyattking, am serving as a scribe at 1:33 PM on 7/7/2021 to document services personally performed by Ron Tanner MD based on my observations and the provider's statements to me.    This note was completed in part using Dragon voice recognition software. Although reviewed after completion, some word and grammatical errors may occur.      Ron Tanner MD  07/07/21 9997

## 2021-07-07 NOTE — ED NOTES
Daughter, Genie called. Genie reports that Elisa Mena, the assisted living, will be coming to the hospital tomorrow (07/08) to do a cognitive assessment for the pt. Then, Elisa Mena, will be admitting the pt on (07/12). MD notified.

## 2021-07-07 NOTE — LETTER
Lois Villalta, [facility] CANDIDO   Inpatient Care Coordination   Supervisor  [facility]  [phone]

## 2021-07-07 NOTE — ED NOTES
Bed: ED22  Expected date:   Expected time:   Means of arrival:   Comments:  Bv1 89yo M falls/dizzy

## 2021-07-07 NOTE — H&P
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  Police were involved and he was refusing to come to the hospital but police placed hold on the patient and he was brought in forcibly.  He is now demanding to go home but family states he is unable to take care of himself based on previous admission I believe that is the case.  We will place a 72-hour hold and have psychiatry evaluate him.    Patient was discussed with Dr. Tanner, who was provider in ED. Chart review of ED work up was reviewed as well as chart review of Care Everywhere, previous visits and admissions.     #Cognitive decline, suspect dementia with agitation and aggression  #Inability to care for himself safely at home  #Medication noncompliance  EMS was called to the patient's home due to being found on the ground 2 times today.  Police had to be involved in order to forcibly bring the patient to the hospital.  He has been demanding to go home since but per family and our assessment he is unsafe to care for himself at home.  He was recently admitted 6/23 through 6/24 for weakness.  During that admission multiple different healthcare providers thought he was unable to make his own medical decisions however his daughters then signed him out AMA refusing support and vulnerable adult case was filed.  Now the daughters are refusing to take him home and care for him stating that he has placement in assisted living next week.  Patient is  clearly not able to go home safely at this time.  Vital signs are unremarkable.  Lab work shows HANY with creatinine 1.96, pancytopenia with WBC 3.8, hemoglobin 12.9 and platelet count 143.  CT head negative.  We have yet to get a urine from him.  -72-hour hold placed in emergency room as patient is not safe to return home yet he is demanding to go home  -Psychiatry consult ordered  -Social work consult  -Obtain UA to ensure he does not have a UTI worsening his dementia  -We will have scheduled Seroquel and as needed Haldol and olanzapine  -Recommend patient has a sitter  -Limit overnight nursing cares and recommend patient sleep as much as possible    #History of severe aortic stenosis  Appears this was diagnosed back in 2019 and he declined further pursuit of valve replacement. Patient did not have syncope, but the aortic stenosis is probably playing a role in his weakness.    Patient has refused all other medications for chronic illness    Social: As independently and definitely should not  Code: Based on prior pain and palliative note will place DNR/DNI  VTE prophylaxis: No anticoagulation ordered  Disposition: Observation                    Chief Complaint:   Inability to take care of himself         History of Present Illness:   Kofi Haddad is a 88 year old male who presents after neighbors called EMS twice for the patient lying in the yard.  Per the emergency room note EMS tried to get him to come to the hospital but he was refusing so police were called who placed a hold on the patient and he was forcibly brought in.  Here he does not recall any of these events and does not know why he is here.  He is demanding to go home.  He has no complaints.  ER has talked to his family a couple of times and they state he is unable to take care of himself and they are unable to take care of him.  They report that he has an assisted living facility to go to next week but is unable to care for himself at home.  He is  very agitated and angry in the emergency room.             Past Medical History:     Past Medical History:   Diagnosis Date     Cancer (H)      High cholesterol      Hypertension      Seizures (H)                Past Surgical History:     Past Surgical History:   Procedure Laterality Date     GENITOURINARY SURGERY                 Social History:     Social History     Socioeconomic History     Marital status:      Spouse name: Not on file     Number of children: Not on file     Years of education: Not on file     Highest education level: Not on file   Occupational History     Not on file   Social Needs     Financial resource strain: Not on file     Food insecurity     Worry: Not on file     Inability: Not on file     Transportation needs     Medical: Not on file     Non-medical: Not on file   Tobacco Use     Smoking status: Former Smoker     Smokeless tobacco: Never Used   Substance and Sexual Activity     Alcohol use: Not on file     Drug use: Not on file     Sexual activity: Not on file   Lifestyle     Physical activity     Days per week: Not on file     Minutes per session: Not on file     Stress: Not on file   Relationships     Social connections     Talks on phone: Not on file     Gets together: Not on file     Attends Confucianism service: Not on file     Active member of club or organization: Not on file     Attends meetings of clubs or organizations: Not on file     Relationship status: Not on file     Intimate partner violence     Fear of current or ex partner: Not on file     Emotionally abused: Not on file     Physically abused: Not on file     Forced sexual activity: Not on file   Other Topics Concern     Not on file   Social History Narrative     Not on file               Family History:   Family history reviewed and is non contributory         Allergies:    No Known Allergies            Medications:     Prior to Admission medications    Not on File              Review of Systems:   A Comprehensive  greater than 10 system review of systems was carried out.  Pertinent positives and negatives are noted above.  Otherwise negative for contributory information.            Physical Exam:   Blood pressure (!) 180/69, pulse 60, temperature 97.9  F (36.6  C), temperature source Oral, resp. rate 18, weight 65 kg (143 lb 3.2 oz), SpO2 96 %.      Physical exam was mostly deferred due to the patient's agitation and grabbing at me.  He was alert not oriented and was unable to answer questions about his history appropriately.  He adamantly denied that he was recently admitted to the hospital for similar reasons.            Data:     Recent Labs   Lab 07/07/21  1404   WBC 3.8*   HGB 12.9*   HCT 39.1*   MCV 97   *     Recent Labs   Lab 07/07/21  1404      POTASSIUM 4.2   CHLORIDE 111*   CO2 25   ANIONGAP 5   *   BUN 34*   CR 1.96*   GFRESTIMATED 30*   GFRESTBLACK 34*   BRUCE 9.1   MAG 2.4*   PROTTOTAL 7.0   ALBUMIN 3.4   BILITOTAL 0.6   ALKPHOS 56   AST 16   ALT 16     No results for input(s): COLOR, APPEARANCE, URINEGLC, URINEBILI, URINEKETONE, SG, UBLD, URINEPH, PROTEIN, UROBILINOGEN, NITRITE, LEUKEST, RBCU, WBCU in the last 168 hours.      Recent Results (from the past 24 hour(s))   CT Head w/o Contrast    Narrative    CT SCAN OF THE HEAD WITHOUT CONTRAST July 7, 2021 2:42 PM     HISTORY: Head trauma, minor (Age >= 65y).    TECHNIQUE: Axial images of the head and coronal reformations without  IV contrast material. Radiation dose for this scan was reduced using  automated exposure control, adjustment of the mA and/or kV according  to patient size, or iterative reconstruction technique.    COMPARISON: Head CT 11/15/2019.    FINDINGS: Moderate volume loss is present with bilateral mesial  temporal lobe predilection. White matter hypoattenuation likely  represents mild to moderate chronic small vessel ischemic change.  Scattered vascular calcifications. Parenchyma is otherwise  unremarkable. No evidence of  acute ischemia, hemorrhage, mass, mass  effect or hydrocephalus. The visualized calvarium, tympanic cavities,  mastoid cavities, and paranasal sinuses are unremarkable. Presumed  cerumen within the bilateral external auditory canals, right worse  than left.      Impression    IMPRESSION: No acute intracranial abnormality.    NESS HERNANDEZ MD         SYSTEM ID:  [id number]         Chela Aggarwal PA-C

## 2021-07-07 NOTE — ED NOTES
"Pt's daughter, Genie, called. Genie requests pt get moved from his home to an assisted living home. Pt's daughter is requested to come be with the pt. Genie reports she does not have a car, but that she will try to find a ride. Genie reports \"I will not take my father home.\" MD is notified.   "

## 2021-07-07 NOTE — LETTER
Lois Villalta, [facility] CANDIDO   Inpatient Care Coordination   Supervisor  [facility]  [phone]

## 2021-07-08 NOTE — CONSULTS
[facility]   Initial Psychiatric Consult   Consult date: July 8, 2021         Reason for Consult, requesting source:    72 hour hold, dementia, aggression, lives independently  Requesting source: Glenn Shepard    This visit was conducted via televideo conference using the [employer] system. Patient consented to video visit at the beginning of interview. Patient was located in his hospital room. Provider located at [facility].     Start time: 1319  End time: 1336        HPI:   Mr. Kofi Haddad is an 88 year old male who was admitted to [facility] on 7/7/21 after presenting to the emergency department via EMS after being found down in his lawn by neighbors. PMH significant for suspected dementia, severe aortic stenosis, seizure disorder, essential hypertension. Patient was placed on a 72 hour hold after admission due to agitation and demanding to leave. Psychiatry is consulted for evaluation.     Per chart review, patient was recently admitted to the hospital from 6/23 to 6/24 for weakness. There was concern from multiple healthcare providers that he was unable to care for himself or make his own medical decisions, however his daughters signed him out AMA, refusing support. A vulnerable adult report was filed. On the day of current admission, EMS was called to patient's home twice after he was found down in his lawn by neighbors. The chart suggests that there has been concern for cognitive decline since at least 2019. Most recent head CT demonstrates moderate cerebral volume loss with bilateral mesial temporal lobe predilection, as well as mild to moderate chronic small vessel ischemic change.     On interview today, patient is seen seated in a chair in his hospital room. He had a very difficult time hearing me on the iPad, despite speaking quite loudly. It seems he did not understand many of my questions. He is unaware of where he is, was able  "to tell me that he is in \"some type of cement building.\" He was reoriented to the hospital. Later in the interview when asked again, he tells me that he [place] on [place] in [place]. He tells me he grew up in [facility] [place] [facility] [place] and then has lived in [place] for the last \"15 years.\" He was able to tell me his correct age \"I'm only 88.\" He tells me his wife passed away 10 years ago, he is not sure what she  from. When asked what year that would have been, he stated \"about '89.\" He tells me that the current year is \"22,\" and then becomes somewhat disorganized and tangential, thoughts incoherent and not. He tells me that he used to enjoy fishing and hunting. Tried to ask him when he last engaged in these activities, but he was not comprehending the question. 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 It is unclear how he is getting food, although did agree when asked if one of his daughters buys his groceries. He seemed unsure about this though. He says he bathes himself without help. At the end of the interview patient states \"I was going to say, your brother Damian passed out and passed away. Maybe you knew that already.\"        Past Psychiatric History:   No known formal psychiatric history. No documented history of psychiatric hospitalizations or suicide attempts. No documented history of psychotropic medication trials, ECT, or MH commitment.         Substance Use and History:   No known history of alcohol or illicit substance abuse or dependence. Is a former smoker.         Past Medical History:   PAST MEDICAL HISTORY:   Past Medical History:   Diagnosis Date     Cancer (H)      High cholesterol      Hypertension      Seizures (H)        PAST SURGICAL HISTORY:   Past Surgical History:   Procedure Laterality Date     GENITOURINARY SURGERY             " Family History:   Per chart, daughter with hx of arthritis  Pt unable to provide additional hx        Social History:   Pt reports that he grew up in [place], [place] and currently lives in [place], [place]. Has 3 children, 2 daughters and 1 son. Wife is . 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 Enjoyed hunting and fishing.           Physical ROS:   The 10-point review of systems was negative except as noted in HPI.         PTA Medications:     No medications prior to admission.          Allergies:   No Known Allergies       Labs:     Recent Results (from the past 48 hour(s))   UA with Microscopic reflex to Culture    Collection Time: 21 11:25 AM    Specimen: Midstream Urine   Result Value Ref Range    Color Urine Yellow     Appearance Urine Slightly Cloudy     Glucose Urine Negative NEG^Negative mg/dL    Bilirubin Urine Negative NEG^Negative    Ketones Urine Negative NEG^Negative mg/dL    Specific Gravity Urine 1.024 1.003 - 1.035    Blood Urine Trace (A) NEG^Negative    pH Urine 5.0 5.0 - 7.0 pH    Protein Albumin Urine 20 (A) NEG^Negative mg/dL    Urobilinogen mg/dL Normal 0.0 - 2.0 mg/dL    Nitrite Urine Negative NEG^Negative    Leukocyte Esterase Urine Negative NEG^Negative    Source Midstream Urine     WBC Urine 1 0 - 5 /HPF    RBC Urine 3 (H) 0 - 2 /HPF    Bacteria Urine Few (A) NEG^Negative /HPF    Squamous Epithelial /HPF Urine <1 0 - 1 /HPF    Transitional Epi 1 0 - 1 /HPF    Mucous Urine Present (A) NEG^Negative /LPF    Hyaline Casts 32 (H) 0 - 2 /LPF   EKG 12-lead, tracing only    Collection Time: 21  1:46 PM   Result Value Ref Range    Interpretation ECG Click View Image link to view waveform and result    CBC with platelets differential    Collection Time: 21  2:04 PM   Result Value Ref Range    WBC 3.8 (L) 4.0 - 11.0 10e9/L    RBC Count 4.02 (L) 4.4 - 5.9 10e12/L    Hemoglobin 12.9 (L) 13.3 - 17.7 g/dL    Hematocrit 39.1 (L) 40.0 -  53.0 %    MCV 97 78 - 100 fl    MCH 32.1 26.5 - 33.0 pg    MCHC 33.0 31.5 - 36.5 g/dL    RDW 14.6 10.0 - 15.0 %    Platelet Count 143 (L) 150 - 450 10e9/L    Diff Method Automated Method     % Neutrophils 49.5 %    % Lymphocytes 33.9 %    % Monocytes 9.7 %    % Eosinophils 5.8 %    % Basophils 0.8 %    % Immature Granulocytes 0.3 %    Nucleated RBCs 0 0 /100    Absolute Neutrophil 1.9 1.6 - 8.3 10e9/L    Absolute Lymphocytes 1.3 0.8 - 5.3 10e9/L    Absolute Monocytes 0.4 0.0 - 1.3 10e9/L    Absolute Eosinophils 0.2 0.0 - 0.7 10e9/L    Absolute Basophils 0.0 0.0 - 0.2 10e9/L    Abs Immature Granulocytes 0.0 0 - 0.4 10e9/L    Absolute Nucleated RBC 0.0    INR    Collection Time: 07/07/21  2:04 PM   Result Value Ref Range    INR 1.05 0.86 - 1.14   Comprehensive metabolic panel    Collection Time: 07/07/21  2:04 PM   Result Value Ref Range    Sodium 141 133 - 144 mmol/L    Potassium 4.2 3.4 - 5.3 mmol/L    Chloride 111 (H) 94 - 109 mmol/L    Carbon Dioxide 25 20 - 32 mmol/L    Anion Gap 5 3 - 14 mmol/L    Glucose 126 (H) 70 - 99 mg/dL    Urea Nitrogen 34 (H) 7 - 30 mg/dL    Creatinine 1.96 (H) 0.66 - 1.25 mg/dL    GFR Estimate 30 (L) >60 mL/min/[1.73_m2]    GFR Estimate If Black 34 (L) >60 mL/min/[1.73_m2]    Calcium 9.1 8.5 - 10.1 mg/dL    Bilirubin Total 0.6 0.2 - 1.3 mg/dL    Albumin 3.4 3.4 - 5.0 g/dL    Protein Total 7.0 6.8 - 8.8 g/dL    Alkaline Phosphatase 56 40 - 150 U/L    ALT 16 0 - 70 U/L    AST 16 0 - 45 U/L   Magnesium    Collection Time: 07/07/21  2:04 PM   Result Value Ref Range    Magnesium 2.4 (H) 1.6 - 2.3 mg/dL   Lactic acid whole blood    Collection Time: 07/07/21  2:04 PM   Result Value Ref Range    Lactic Acid 1.7 0.7 - 2.0 mmol/L   Asymptomatic SARS-CoV-2 COVID-19 Virus (Coronavirus) by PCR    Collection Time: 07/07/21  3:31 PM    Specimen: Nasopharyngeal   Result Value Ref Range    SARS-CoV-2 Virus Specimen Source Nasopharyngeal     SARS-CoV-2 PCR Result NEGATIVE     SARS-CoV-2 PCR Comment  "(Note)           Physical and Psychiatric Examination:     BP (!) 128/97 (BP Location: Right arm)   Pulse (!) 49   Temp 97.8  F (36.6  C) (Oral)   Resp 20   Wt 65 kg (143 lb 3.2 oz)   SpO2 96%   BMI 23.11 kg/m    Weight is 143 lbs 3.2 oz  Body mass index is 23.11 kg/m .    Physical Exam:  I have reviewed the physical exam as documented by by the medical team and agree with findings and assessment and have no additional findings to add at this time.    Mental Status Exam:    Appearance: age-appearing, appropriate hygiene and grooming, dressed in button up shirt, in no acute distress  Behavior: cooperative and pleasant  Orientation: alert and oriented to self. Not oriented to place, date, or situation  Movements: no abnormal or involuntary movements observed  Mood: \"bored\"  Affect: stable, mildly restricted in range, calm  Speech: Normal rate, rhythm, tone  Memory: recent and remote memory appear impaired based on poor recall of recent and past events  Fund of knowledge: below average  Concentration: attentive to interview  Thought process and content: at times, thoughts appear illogical, incoherent, difficult to follow. Other times, responses are appropriate to the question. Does not appear to respond to internal stimuli. No paranoia elicited. Appears to have some delusional thinking. Associations are a bit loose.   Insight: impaired  Judgement: impaired  Safety: does not voice thoughts to harm himself or others, but has been combative when agitated.          DSM-5 Diagnosis:   #1 Unspecified neurocognitive disorder with agitation  #2 r/o delirium          Assessment:   Mr. Kofi Haddad is an 88 year old male who was admitted to [facility] on 7/7/21 after presenting to the emergency department via EMS after being found down in his lawn by neighbors. PMH significant for suspected dementia, severe aortic stenosis, seizure disorder, essential hypertension.     Mr. Haddad was seen for an " evaluation today. He is quite calm appearing on my interview today. Had some difficulty comprehending some of my questions, seems to be quite hard of hearing. He does not know where he is or why he is here. He does not know the year. He becomes mildly tangential at times and some of his thoughts do not appear based in reality. He does not appear to have the ability to retain information. He does not appear to have the ability to understand or verbalize the risks and benefits of his decisions or rational behind decisions. Does not appear to have the ability to understand the consequences of his decisions. He is unable to clearly tell me how he gets food, how he showers, or manages his daily life at home. Given this data, patient would not have the capacity to make medical decisions, such as choosing to leave AMA. He is unable to demonstrate the capacity to safely care for himself at home. Will need to defer to next of kin for medical decision making.    In regard to the 72 hour hold, patients who lack decision-making capacity do not necessarily require a 72 hour hold to keep them in the hospital. Decisions, such as deciding to discharge, should be made by next of kin. We are able to hold patients who do not have capacity without needing a 72 hour hold. Unfortunately, if security needs to be involved to keep this patient or others safe, they will require that a 72 hour hold be active or placed before they will go hands on. In that respect, the 72 hour hold can be continued if it is felt that security presence may be necessary in the near future.     For now, would continue quetiapine 25 mg bid as it appears effective. Agree with PRN options for agitation. It sounds as though patient could discharge to a memory care facility as soon as tomorrow, which would be an excellent plan.           Summary of Recommendations:   1) Patient does not have capacity to make medical decisions. He is not able to demonstrate capacity to  safely care for himself at home.    2) In the context of lack of capacity, decisions should be made by next of kin. We are able to hold a patient who is demanding to leave when it is deemed that they lack capacity. A 72 hour hold would only be necessary in this case if security were needed to be involved.     3) Patient clearly has signs of dementia. CT demonstrates moderate cerebral volume loss with bilateral mesial temporal lobe predilection, as well as mild to moderate chronic small vessel ischemic change. Recommend patient f/u with neurology as an outpatient for any further recommendations in regard to his dementia.     4) Continue quetiapine 25 mg bid with olanzapine PRN PO or IM backup option for severe agitation    5) Delirium precautions (up during the day with lights on; lights off at night, avoid interruptions during the night as much as possible; family visits; encourage wearing glasses and/or hearing aids as applicable; frequent reorientation)    6) Page me with additional questions or concerns, thank you.      HENOK Santiago, CNP  [facility]   Contact information available via [facility] Paging/Directory

## 2021-07-08 NOTE — PROGRESS NOTES
Care Management Discharge Note    Discharge Date: 07/12/21     Discharge Disposition:  [facility] Monday    Discharge Transportation:  Family    Private pay costs discussed: Not applicable    PAS Confirmation Code:  N/A  Patient/family educated on Medicare website which has current facility and service quality ratings:  Yes    Education Provided on the Discharge Plan:  Yes  Persons Notified of Discharge Plans: Patient's daughters, [facility]  Patient/Family in Agreement with the Plan:  Yes    Handoff Referral Completed: No    Additional Information:   received call from Yasmin, placement supervisor with [facility]. They have clinically accepted patient and could do an admission on Monday, 7/12. Facility to complete admission paperwork with family tomorrow, 7/9.     SW to coordinate admission with Yasmin at [facility], [phone]. Discharge orders to be faxed to (f) [phone]. Medications to [facility] to be filled at discharge.     Facility is okay with patient having a 1:1 in the hospital for safety/wander risk reasons.  can administer PRN medications but not IM meds--updated charge RN.     CANDIDO will continue to follow for anticipated discharge Monday to .     BELINDA Hernadez

## 2021-07-08 NOTE — PROGRESS NOTES
1204 Paged provider:    Haldol is ordered as IV. Pt does not have IV access as pt refuses to keep IV in. Please order as IM.      0012    Provider ordered Haldol IM/IV

## 2021-07-08 NOTE — PLAN OF CARE
PRIMARY DIAGNOSIS: AGGRESSION, FREQUENT FALLS, PLACEMENT   OUTPATIENT/OBSERVATION GOALS TO BE MET BEFORE DISCHARGE:  1. ADLs back to baseline: No    2. Activity and level of assistance: Up with standby assistance.    3. Pain status: Pain free.    4. Return to near baseline physical activity: No     Blood pressure (!) 177/61, pulse 50, temperature 97.7  F (36.5  C), temperature source Oral, resp. rate 16, weight 65 kg (143 lb 3.2 oz), SpO2 97 %.    VSS  Patient has a history of HTN, with probable non compliance.  Patient denies pain.   Patient is intermittently agitated, aggressive, however is easily redirected.  Sitter at bedside due to falls and  Hold status.  Patient has no IV access; fluids encouraged.  Patient tolerating evening meal tray, however eating only 25 percent of served foods.   Patient is an assist of one, ambulating to the bathroom prn.    Plan:  Continue to provide supportive cares.  Psychiatry Consult and SW consult planned for placement.  It is reported that [facility] living will be visiting tomorrow to perform a cognitive assessment.     Discharge Planner Nurse   Safe discharge environment identified: No, placement pending  Barriers to discharge: Yes       Entered by: Karen M. Lesch 07/07/2021 9:36 PM     Please review provider order for any additional goals.   Nurse to notify provider when observation goals have been met and patient is ready for discharge.

## 2021-07-08 NOTE — CONSULTS
Care Management Initial Consult    General Information  Assessment completed with: Patient, Care Team Member, Lainey at [facility] [phone].  Type of CM/SW Visit: Offer D/C Planning    Primary Care Provider verified and updated as needed: Yes   Readmission within the last 30 days:     Reason for Consult: discharge planning  Advance Care Planning: Advance Care Planning Reviewed: no concerns identified        Communication Assessment  Patient's communication style: spoken language (English or Bilingual)    Hearing Difficulty or Deaf: no   Wear Glasses or Blind: no    Cognitive  Cognitive/Neuro/Behavioral: .WDL except  Level of Consciousness: confused  Arousal Level: opens eyes spontaneously  Orientation: disoriented to, place, time, situation  Mood/Behavior: agitated, anxious(labile)  Best Language: 0 - No aphasia  Speech: clear    Living Environment:   People in home: alone     Current living Arrangements: house      Able to return to prior arrangements: yes     Family/Social Support:  Care provided by: self  Provides care for: no one, unable/limited ability to care for self  Marital Status:   Children          Description of Support System: Supportive, Involved    Support Assessment: Lacks necessary supervision and assistance    Current Resources:   Patient receiving home care services: No     Community Resources: None  Equipment currently used at home: none  Supplies currently used at home:      Employment/Financial:  Employment Status: retired        Financial Concerns: insurance, none   Referral to Financial Counselor: No     Lifestyle & Psychosocial Needs:     Socioeconomic History     Marital status:      Spouse name: Not on file     Number of children: Not on file     Years of education: Not on file     Highest education level: Not on file     Tobacco Use     Smoking status: Former Smoker     Smokeless tobacco: Never Used     Functional Status:  Prior to admission patient needed  assistance: Patient admitted under observation status for frequent falls and inability to care for self. Patient lives alone in [facility] of dementia. Pt is known to this SW from previous admission 6/24 when family signed pt out AMA, [facility] report was filed at that time. Pt was brought in after frequent falls. Dtr's report that pt was managing fine at home. Daughters would check in on patient 2-3x/week.     Daughters are now trying to obtain POA paperwork and transition patient into a  facility as patient is unsafe to live at home alone.     2 staff from [facility] facility came in to hospital today to assess pt for potential move in to memory care. Daughters are touring facility today.     Mental Health Status: Disoriented to time, place, situation. Alert to self.         Chemical Dependency Status: N/A        Values/Beliefs:  Spiritual, Cultural Beliefs, Zoroastrian Practices, Values that affect care: Yes             Additional Information:  Lainey from [facility] to call SW this afternoon after family tours facility and they have completed their assessment paperwork with final determination ([phone]). If family/facility agree to admission, they could admit patient as soon as tomorrow. SW will continue to follow.      1415: ZULEYKA from Yasmin at [facility], [phone] requesting a return call to coordinate patients admissions. SW returned call, left VM. Awaiting a call back.     BELINDA Hernadez

## 2021-07-08 NOTE — PLAN OF CARE
PRIMARY DIAGNOSIS:  AGGRESSION, FREQUENT FALLS, PLACEMENT   OUTPATIENT/OBSERVATION GOALS TO BE MET BEFORE DISCHARGE:  ADLs back to baseline: No    Activity and level of assistance: Up with standby assistance.    Pain status: Pain free.    Return to near baseline physical activity: No     Discharge Planner Nurse   Safe discharge environment identified: No  Barriers to discharge: Yes       Entered by: Afshan Arias 07/08/2021      Pt has been sleeping soundly. Sitter at bedside. Pt was up and aggressive, however, redirectable. Per provider's note, nursing care has been minimal to allow pt to sleep.       Please review provider order for any additional goals.   Nurse to notify provider when observation goals have been met and patient is ready for discharge.

## 2021-07-08 NOTE — PROGRESS NOTES
PA- Amber aware of hypertension and bradycardia. Patient's HR was 35 when sleeping. On reassessment, HR improved to 40s and 50s when awake.  No change in plan of care.

## 2021-07-08 NOTE — PROGRESS NOTES
"Pt appears angry and agitated. Pt yelling out, \"get out of my way\" as he is attempting to walk out of his room. Pt states he needed to go out and get food. Writer states we can bring food in for him and directed pt back to room. Pt turned back into room and briefly had his fists out in front of him and stated, \"If you weren't a girl, I would beat the crap out of you.\" Sitter and writer redirected pt back to bed. Pt took a sip of water and went back to bed. Lights adjusted, stimuli minimized, and sleep promoted. Sitter at bedside for safety. Will continue to monitor.   "

## 2021-07-08 NOTE — PROGRESS NOTES
PRIMARY DIAGNOSIS:  AGGRESSION, FREQUENT FALLS, PLACEMENT   OUTPATIENT/OBSERVATION GOALS TO BE MET BEFORE DISCHARGE:  1. ADLs back to baseline: No     2. Activity and level of assistance: Up with standby assistance.     3. Pain status: Pain free.     4. Return to near baseline physical activity: No          Discharge Planner Nurse   Safe discharge environment identified: No  Barriers to discharge: Yes       Entered by: Belem Jacobs 0800      Patient is alert to self. Patient calm this morning. No prn meds needed. On scheduled seroquel. No IV access. Regular diet. Sitter bedside. Up SBA. No family present. SW and psych to see.      Please review provider order for any additional goals.   Nurse to notify provider when observation goals have been met and patient is ready for discharge.

## 2021-07-08 NOTE — PHARMACY-ADMISSION MEDICATION HISTORY
Admission medication history interview status for this patient is complete. See [facility] admission navigator for allergy information, prior to admission medications and immunization status.     Prior to Admission medications    None

## 2021-07-08 NOTE — PROGRESS NOTES
[facility]  Hospitalist Progress Note  Chela Aggarwal PA-C 07/08/2021    Reason for Stay (Diagnosis): Dementia with agitation and aggression, need for safe placement         Assessment and Plan:      Summary of Stay: Kofi Haddad is a 88 year old male with history of undiagnosed dementia with aggressive/agitation, severe aortic stenosis, seizure disorder and HTN admitted on 7/7/2021 after being forcibly brought by EMS for being found outside on the ground twice in one day. In the ED he had no infectious complaints. CMP and CBC remarkable for HANY and pancytopenia. CT head negative. Not agreeable to UA and not agreeable to having IV placed. Demanding to go home but family not able to help him so 72 hour hold placed and haldol given to calm him down. He is unsafe to stay at home by himself and needs a memory care.    #Cognitive decline, suspect dementia with agitation and aggression  #Inability to care for himself safely at home  #Medication noncompliance  EMS was called to the patient's home due to being found on the ground 2 times on 7/7.  Police had to be involved in order to forcibly bring the patient to the hospital.  He has been demanding to go home since but per family and our assessment he is unsafe to care for himself at home.  He was recently admitted 6/23 through 6/24 for weakness.  During that admission multiple different healthcare providers thought he was unable to make his own medical decisions however his daughters then signed him out AMA refusing support and vulnerable adult case was filed.  Now the daughters are refusing to take him home stating that he has placement in assisted living next week.  Patient is clearly not able to go home safely at this time.  Vital signs are unremarkable.  Lab work shows HANY with creatinine 1.96, pancytopenia with WBC 3.8, hemoglobin 12.9 and platelet count 143.  CT head negative.  We have yet to get a urine from him.  -72-hour hold placed  in emergency room as patient is not safe to return home yet he is demanding to go home  -Psychiatry consult ordered  -Social work consult  -Obtain UA to ensure he does not have a UTI worsening his dementia  -We will have scheduled Seroquel and as needed Haldol and olanzapine  -Recommend patient has a sitter  -Limit overnight nursing cares and recommend patient sleep as much as possible     #History of severe aortic stenosis  Appears this was diagnosed back in 2019 and he declined further pursuit of valve replacement. Patient did not have syncope, but the aortic stenosis is probably playing a role in his weakness.     Patient has refused all other medications for chronic illness      DVT Prophylaxis: Ambulate every shift  Code Status: DNR / DNI  Discharge Dispo: Needs higher level of care          Interval History (Subjective):      Agitated overnight requiring frequent redirection and medicine to calm him down. Refuses IV access. No other complaints                  Physical Exam:      Last Vital Signs:  BP (!) 128/97 (BP Location: Right arm)   Pulse (!) 49   Temp 97.8  F (36.6  C) (Oral)   Resp 20   Wt 65 kg (143 lb 3.2 oz)   SpO2 96%   BMI 23.11 kg/m        Intake/Output Summary (Last 24 hours) at 7/8/2021 1312  Last data filed at 7/8/2021 1247  Gross per 24 hour   Intake 780 ml   Output --   Net 780 ml     Limited exam done due to agitation and here only for social placement.  Patient sleeping in chair and looks comfortable.             Medications:      All current medications were reviewed with changes reflected in problem list.         Data:      All new lab and imaging data was reviewed.   Labs:  Recent Labs   Lab 07/07/21  1404      POTASSIUM 4.2   CHLORIDE 111*   CO2 25   ANIONGAP 5   *   BUN 34*   CR 1.96*   GFRESTIMATED 30*   GFRESTBLACK 34*   BRUCE 9.1     Recent Labs   Lab 07/07/21  1404   WBC 3.8*   HGB 12.9*   HCT 39.1*   MCV 97   *      Imaging:   Recent Results (from the past  24 hour(s))   CT Head w/o Contrast    Narrative    CT SCAN OF THE HEAD WITHOUT CONTRAST July 7, 2021 2:42 PM     HISTORY: Head trauma, minor (Age >= 65y).    TECHNIQUE: Axial images of the head and coronal reformations without  IV contrast material. Radiation dose for this scan was reduced using  automated exposure control, adjustment of the mA and/or kV according  to patient size, or iterative reconstruction technique.    COMPARISON: Head CT 11/15/2019.    FINDINGS: Moderate volume loss is present with bilateral mesial  temporal lobe predilection. White matter hypoattenuation likely  represents mild to moderate chronic small vessel ischemic change.  Scattered vascular calcifications. Parenchyma is otherwise  unremarkable. No evidence of acute ischemia, hemorrhage, mass, mass  effect or hydrocephalus. The visualized calvarium, tympanic cavities,  mastoid cavities, and paranasal sinuses are unremarkable. Presumed  cerumen within the bilateral external auditory canals, right worse  than left.      Impression    IMPRESSION: No acute intracranial abnormality.    NESS HERNANDEZ MD         SYSTEM ID:  [id number]

## 2021-07-08 NOTE — UTILIZATION REVIEW
Admission Status; Secondary Review Determination    Under the authority of the Utilization Management Committee, the utilization review process indicated a secondary review on the above patient. The review outcome is based on review of the medical records, discussions with staff, and applying clinical experience noted on the date of the review.    (x) Inpatient Status Appropriate - This patient's medical care is consistent with medical management for inpatient care and reasonable inpatient medical practice.    RATIONALE FOR DETERMINATION: 88-year-old male with history of dementia with episodes of aggression, severe aortic stenosis, seizure disorder, hypertension who recently was hospitalized for hypertensive urgency and was discharged AGAINST MEDICAL ADVICE by family.  Patient now presents to hospital with repeated episodes of being found on the ground and brought in by paramedics.  Patient has profound weakness.  He has significant dementia with recurrent agitation requiring IM antipsychotics both on the day of admission as well as in the hospital day 2.  This is in addition to an oral regimen prescribed.  The severity of patient's dementia and agitation with significant weakness requiring close monitoring and dosing of breakthrough antipsychotics for adequate patient and staff safety is appropriate for inpatient management.    At the time of admission with the information available to the attending physician more than 2 nights Hospital complex care was anticipated, based on patient risk of adverse outcome if treated as outpatient and complex care required. Inpatient admission is appropriate based on the Medicare guidelines.    This document was produced using voice recognition software    The information on this document is developed by the utilization review team in order for the business office to ensure compliance. This only denotes the appropriateness of proper admission status and does not reflect the  quality of care rendered.    The definitions of Inpatient Status and Observation Status used in making the determination above are those provided in the CMS Coverage Manual, Chapter 1 and Chapter 6, section 70.4.    Sincerely,    Alonso Deng MD  Utilization Review  Physician Advisor  [facility].

## 2021-07-08 NOTE — PROGRESS NOTES
PRIMARY DIAGNOSIS:  AGGRESSION, FREQUENT FALLS, PLACEMENT   OUTPATIENT/OBSERVATION GOALS TO BE MET BEFORE DISCHARGE:  1. ADLs back to baseline: No     2. Activity and level of assistance: Up with standby assistance.     3. Pain status: Pain free.     4. Return to near baseline physical activity: No          Discharge Planner Nurse   Safe discharge environment identified: No  Barriers to discharge: Yes       Entered by: Belem Jacobs 0800      Patient is alert to self. Patient calm this morning. No aggression noted. No prn meds needed. On scheduled seroquel. No IV access. Regular diet. Sitter bedside. Up SBA. No family present. SW following.  Psych to see.      Please review provider order for any additional goals.   Nurse to notify provider when observation goals have been met and patient is ready for discharge.

## 2021-07-08 NOTE — PLAN OF CARE
PRIMARY DIAGNOSIS:  AGGRESSION, FREQUENT FALLS, PLACEMENT   OUTPATIENT/OBSERVATION GOALS TO BE MET BEFORE DISCHARGE:  1. ADLs back to baseline: No    2. Activity and level of assistance: Up with standby assistance.    3. Pain status: Pain free.    4. Return to near baseline physical activity: No     Discharge Planner Nurse   Safe discharge environment identified: No  Barriers to discharge: Yes       Entered by: Afshan Arias 07/08/2021      Sitter at beside; pt sleeping. Per provider orders, nursing care kept to minimum to allow pt to sleep    Please review provider order for any additional goals.   Nurse to notify provider when observation goals have been met and patient is ready for discharge.

## 2021-07-08 NOTE — PLAN OF CARE
PRIMARY DIAGNOSIS: AGGRESSION, FREQUENT FALLS, PLACEMENT  OUTPATIENT/OBSERVATION GOALS TO BE MET BEFORE DISCHARGE:  1. ADLs back to baseline: No    2. Activity and level of assistance: Up with standby assistance.    3. Pain status: Pain free.    4. Return to near baseline physical activity: No     Blood pressure (!) 186/68, pulse 53, temperature 97.8  F (36.6  C), temperature source Oral, resp. rate 18, weight 65 kg (143 lb 3.2 oz), SpO2 97 %.    Patient has history of hypertension.  Patient denies pain.  Patient demonstrating increased aggitation, aggression; striking out at sitter and staff.  Refusing oral Zyprexa; forced to give Zyprexa IM.  Frequent presence needed in attempt to calm.  Awaiting improvement from Zyprexa.  Plan:  Attempt to calm, and keep safe at this time.        Discharge Planner Nurse   Safe discharge environment identified:  Memory care placement pending  Barriers to discharge:  Yes. Awaiting safe environment         Entered by: Karen M. Lesch 07/08/2021  1630 PM     Please review provider order for any additional goals.   Nurse to notify provider when observation goals have been met and patient is ready for discharge.

## 2021-07-09 NOTE — PLAN OF CARE
PRIMARY DIAGNOSIS: FREQUENT FALLS  OUTPATIENT/OBSERVATION GOALS TO BE MET BEFORE DISCHARGE:  ADLs back to baseline: No     Activity and level of assistance: Up with standby assistance.     Pain status: Pain free.     Return to near baseline physical activity: No          Discharge Planner Nurse   Safe discharge environment identified: Yes  Barriers to discharge: Yes       Entered by: Elzbieta Rodriguez 07/09/2021 3:56 AM  Patient agitated, alert only to self. Sitter present in room. BP elevated, MD paged and PRN clonidine ordered. Denies pain. Skin intact. Plan is to discharge to [facility] on Monday 7/13.    BP (!) 176/112 (BP Location: Right arm)   Pulse 122   Temp 98.6  F (37  C) (Axillary)   Resp 18   Wt 65 kg (143 lb 3.2 oz)   SpO2 94%   BMI 23.11 kg/m       Please review provider order for any additional goals.   Nurse to notify provider when observation goals have been met and patient is ready for discharge.

## 2021-07-09 NOTE — PLAN OF CARE
BP (!) 171/87 (BP Location: Right arm)   Pulse 59   Temp 98.2  F (36.8  C) (Oral)   Resp 15   Wt 65 kg (143 lb 3.2 oz)   SpO2 94%   BMI 23.11 kg/m      Neuro: Alert to self   Cardiac: WNL   Lungs: WNL   GI: WNL  Pain: denies  IV: No IV access  Meds: Scheduled Seroquel. IM haldol prn. Given once this shift, became agitated and attempting to leave room.    Diet: Regular   Activity: SBA    Misc: Sitter at bedside   Plan: Placement on Monday morning, Continue to attempt to calm, patient is a safety risk     Will continue to monitor and provide cares.

## 2021-07-09 NOTE — PROGRESS NOTES
[facility]  Hospitalist Progress Note  Chela Aggarwal PA-C 07/09/2021    Reason for Stay (Diagnosis): Dementia with agitation and aggression, need for safe placement         Assessment and Plan:      Summary of Stay: Kofi Haddad is a 88 year old male with history of undiagnosed dementia with aggressive/agitation, severe aortic stenosis, seizure disorder and HTN admitted on 7/7/2021 after being forcibly brought by EMS for being found outside on the ground twice in one day. In the ED he had no infectious complaints. CMP and CBC remarkable for HANY and pancytopenia. CT head negative. Not agreeable to UA and not agreeable to having IV placed. Demanding to go home but family not able to help him so initially 72 hour hold placed and he received chemical sedation.  72-hour hold was lifted on 7/9 but he is unsafe to stay at home by himself and is being placed in a memory care on 7/12.  Here in the hospital he has refused all cares, IV and medicine.     #Cognitive decline, suspect dementia with agitation and aggression  #Inability to care for himself safely at home  #Medication noncompliance  EMS was called to the patient's home due to being found on the ground 2 times on 7/7. Police had to be involved in order to forcibly bring the patient to the hospital.  He has been demanding to go home since but per family and our assessment he is unsafe to care for himself at home.  He was recently admitted 6/23 through 6/24 for weakness.  During that admission multiple different healthcare providers thought he was unable to make his own medical decisions however his daughters then signed him out AMA refusing support and vulnerable adult case was filed. Psychiatry consulted and agree patient does not have mental capacity to make own decisions but recommend removing 72 hour hold. A memory care has accepted him on 7/12.  -Psychiatry assessed and patient lacks mental capacity to make own decisions  -Family  does not want to take him home  -Memory care accepted him 7/12  -Scheduled Seroquel started on this admission and titrated. 25 mg in AM and 50 mg in the evening to control nightly outbursts. May use haldol or zyprexa as needed.  -Limit overnight nursing cares and recommend patient sleep as much as possible     #History of severe aortic stenosis  Appears this was diagnosed back in 2019 and he declined further pursuit of valve replacement. Patient did not have syncope, but the aortic stenosis is probably playing a role in his weakness.    #Hypertension  Most checks reveal severe hypertension but patient refuses treatments.     Patient has refused all other medications for chronic illness        DVT Prophylaxis: Ambulate every shift  Code Status: DNR / DNI  Discharge Dispo: Needs higher level of care        Interval History (Subjective):      Sleeping this morning. Agitated overnight but was redirectable.                  Physical Exam:      Last Vital Signs:  BP (!) 151/75 (BP Location: Right arm)   Pulse 57   Temp 98.2  F (36.8  C) (Axillary)   Resp 18   Wt 65 kg (143 lb 3.2 oz)   SpO2 100%   BMI 23.11 kg/m        Intake/Output Summary (Last 24 hours) at 7/9/2021 1305  Last data filed at 7/9/2021 0930  Gross per 24 hour   Intake 220 ml   Output 350 ml   Net -130 ml       Constitutional: Awake, alert, no apparent distress   Respiratory: Clear to auscultation bilaterally, no crackles or wheezing   Cardiovascular: Systolic murmur heard   Abdomen: Normal bowel sounds, soft, non-distended, non-tender   Skin: No rashes, no cyanosis, dry to touch   Neuro: Alert but not orientated. Significant memory loss and easy to agitate   Extremities: No edema, normal range of motion   Other(s):        All other systems: Negative             Medications:      All current medications were reviewed with changes reflected in problem list.         Data:      All new lab and imaging data was reviewed.   Labs:  Recent Labs   Lab  07/07/21  1404      POTASSIUM 4.2   CHLORIDE 111*   CO2 25   ANIONGAP 5   *   BUN 34*   CR 1.96*   GFRESTIMATED 30*   GFRESTBLACK 34*   BRUCE 9.1     Recent Labs   Lab 07/07/21  1404   WBC 3.8*   HGB 12.9*   HCT 39.1*   MCV 97   *      Imaging:   No results found for this or any previous visit (from the past 24 hour(s)).

## 2021-07-09 NOTE — PROGRESS NOTES
Care Management Discharge Note    Discharge Date: 07/12/21     Discharge Disposition:  Admit to [facility] Monday 7/12    Discharge Services:  Memory Care Facility    Discharge Transportation:  Daughter Meche will transport at 10/10:30am Monday 7/12.     Private pay costs discussed: Not applicable    PAS Confirmation Code:  N/A  Patient/family educated on Medicare website which has current facility and service quality ratings:  Yes    Education Provided on the Discharge Plan:  Yes  Persons Notified of Discharge Plans: Patient's daughter Meche, [facility] Yasmin-admissions director ([phone]).   Patient/Family in Agreement with the Plan:  Yes    Handoff Referral Completed: No    Additional Information:  Admit to [facility], Monday 7/12. Daughter Meche will transport at 10-10:30am Monday. Family is at facility signing admission paperwork at facility today. Medications to be sent to [facility] to be filled. Discharge orders to be faxed to [facility], (f) [phone].  to confirm discharge planning with Yasmin at [facility] ([phone]) and nereyda Mcgregor ([phone]) on Monday morning.     BELINDA Hernadez

## 2021-07-09 NOTE — PLAN OF CARE
PRIMARY DIAGNOSIS: FREQUENT FALLS  OUTPATIENT/OBSERVATION GOALS TO BE MET BEFORE DISCHARGE:  ADLs back to baseline: No    Activity and level of assistance: Up with standby assistance.    Pain status: Pain free.    Return to near baseline physical activity: No     Discharge Planner Nurse   Safe discharge environment identified: Yes  Barriers to discharge: Yes       Entered by: Elzbieta Rodriguez 07/09/2021 3:54 AM     Patient agitated, alert only to self. Sitter present in room. BP elevated, MD paged and PRN clonidine ordered. Denies pain. Skin intact. Plan is to discharge to [facility] on Monday 7/13.  BP (!) 176/112 (BP Location: Right arm)   Pulse 122   Temp 98.6  F (37  C) (Axillary)   Resp 18   Wt 65 kg (143 lb 3.2 oz)   SpO2 94%   BMI 23.11 kg/m      Please review provider order for any additional goals.   Nurse to notify provider when observation goals have been met and patient is ready for discharge.

## 2021-07-09 NOTE — PLAN OF CARE
"PRIMARY DIAGNOSIS: AGGRESSION, FREQUENT FALLS, PLACEMENT  OUTPATIENT/OBSERVATION GOALS TO BE MET BEFORE DISCHARGE:  1. ADLs back to baseline: No    2. Activity and level of assistance: Up with standby assistance.    3. Pain status: Denies    4. Return to near baseline physical activity: No     Blood pressure (!) 207/79, pulse 50, temperature 98  F (36.7  C), temperature source Oral, resp. rate 20, weight 65 kg (143 lb 3.2 oz), SpO2 95 %.    Aggression continues with patient frrequently attempting to strike out at staff when restrained from leaving his room.  Patient frequently attempting to get out of bed or out of the chair, insisting on leaving.  Haldol given IM as patient refusing any medications at this time orally.  Patient is \"wobbly\" on his feet, at great risk for falling.  Sitter at bedside, however at times three staff are needed to control and calm.  Plan:  Continue to attempt to calm, patient is a safety risk.  Patient changed to Impatient status.   .        Discharge Planner Nurse   Safe discharge environment identified:  Memory care placement pending  Barriers to discharge:  Yes. Awaiting safe environment         Entered by: Karen M. Lesch 07/08/2021  21:56 PM       Please review provider order for any additional goals.   Nurse to notify provider when observation goals have been met and patient is ready for discharge.  "

## 2021-07-10 NOTE — PLAN OF CARE
BP (!) 173/74 (BP Location: Right arm)   Pulse 66   Temp 97.7  F (36.5  C) (Axillary)   Resp 16   Wt 65 kg (143 lb 3.2 oz)   SpO2 91%   BMI 23.11 kg/m      Neuro: Alert and oriented to self only  Cardiac: WNL   Lungs: WNL  GI: WNL  Pain: Denies pain   IV: No IV access okay per provider   Meds: Scheduled Seroquel and prn IM haldol and oral xyprexa   Diet: Regular   Activity: SBA w/walker gait belt   Misc: Patient slept majority of morning, started getting impulsive. Ambulated hallway and now back in chair and resting comfortably.   Plan: SW following, Elisa Mena , Monday 7/12 daughter will transport Monday. Sitter at bedside.     Will continue to monitor and provide cares.      Rashel Colmenares RN     Patient becoming impulsive and safety risk. IM haldol given with no improvement in behavior. Became more agitated IM xyprexa given.

## 2021-07-10 NOTE — PROGRESS NOTES
[facility]  Hospitalist Progress Note  Jeannie Sarabia PA-C, LATHA 07/10/2021    Reason for Stay (Diagnosis): Dementia with agitation and aggression, need for safe placement         Assessment and Plan:      Summary of Stay: Kofi Haddad is a 88 year old male with history of undiagnosed dementia with aggressive/agitation, severe aortic stenosis, seizure disorder and HTN admitted on 7/7/2021 after being forcibly brought by EMS for being found outside on the ground twice in one day. In the ED he had no infectious complaints. CMP and CBC remarkable for HANY and pancytopenia. CT head negative. Not agreeable to UA and not agreeable to having IV placed. Demanding to go home but family not able to help him so initially 72 hour hold placed and he received chemical sedation.  72-hour hold was lifted on 7/9 but he is unsafe to stay at home by himself and is being placed in a memory care on 7/12.  Here in the hospital he has refused all cares, IV and medicine.     #Cognitive decline, suspect dementia with agitation and aggression  #Inability to care for himself safely at home  #Medication noncompliance  EMS was called to the patient's home due to being found on the ground 2 times on 7/7. Police had to be involved in order to forcibly bring the patient to the hospital.  He has been demanding to go home since but per family and our assessment he is unsafe to care for himself at home.  He was recently admitted 6/23 through 6/24 for weakness.  During that admission multiple different healthcare providers thought he was unable to make his own medical decisions however his daughters then signed him out AMA refusing support and vulnerable adult case was filed. Psychiatry consulted and agree patient does not have mental capacity to make own decisions but recommend removing 72 hour hold. A memory care has accepted him on 7/12.  -Psychiatry assessed and patient lacks mental capacity to make own  decisions  -Family does not want to take him home  -Memory care accepted him 7/12  -Scheduled Seroquel started on this admission and titrated. 25 mg in AM and 50 mg in the evening to control nightly outbursts. May use haldol or zyprexa as needed.  -Limit overnight nursing cares and recommend patient sleep as much as possible     #History of severe aortic stenosis  Appears this was diagnosed back in 2019 and he declined further pursuit of valve replacement. Patient did not have syncope, but the aortic stenosis is probably playing a role in his weakness.     #Hypertension  Most checks reveal severe hypertension but patient refuses treatments.     Patient has refused all other medications for chronic illness        DVT Prophylaxis: Ambulate every shift  Code Status: DNR / DNI  Discharge Dispo: Needs higher level of care        Interval History (Subjective):      Sleeping. Easily agitated when roused.                   Physical Exam:      Last Vital Signs:  BP (!) 157/67 (BP Location: Right arm)   Pulse 64   Temp 97.7  F (36.5  C) (Axillary)   Resp 20   Wt 65 kg (143 lb 3.2 oz)   SpO2 92%   BMI 23.11 kg/m      I/O last 3 completed shifts:  In: 120 [P.O.:120]  Out: -   Vitals:    07/07/21 1331   Weight: 65 kg (143 lb 3.2 oz)       Constitutional: Sleeping, no apparent distress   Respiratory: Clear to auscultation bilaterally, no crackles or wheezing   Cardiovascular: Systolic murmur    Abdomen: Normal bowel sounds, soft, non-distended, non-tender   Skin: No rashes, no cyanosis, dry to touch   Neuro: Significant memory loss, easily agitated   Extremities: No edema, normal range of motion   Other(s):        All other systems: Negative          Medications:      All current medications were reviewed with changes reflected in problem list.         Data:      All new lab and imaging data was reviewed.   Labs:  No results for input(s): CULT in the last 168 hours.  No results for input(s): PH, PHARTERIAL, PO2,  [id number], SAT, PCO2, HCO3, BASEEXCESS, BULL, BEB in the last 168 hours.    Invalid input(s): [id number]       Lab Results   Component Value Date     07/07/2021     06/24/2021     06/23/2021    Lab Results   Component Value Date    CHLORIDE 111 07/07/2021    CHLORIDE 108 06/24/2021    CHLORIDE 115 06/23/2021    Lab Results   Component Value Date    BUN 34 07/07/2021    BUN 28 06/24/2021    BUN 27 06/23/2021      Lab Results   Component Value Date    POTASSIUM 4.2 07/07/2021    POTASSIUM 4.3 06/24/2021    POTASSIUM 3.6 06/23/2021    Lab Results   Component Value Date    CO2 25 07/07/2021    CO2 23 06/24/2021    CO2 21 06/23/2021    Lab Results   Component Value Date    CR 1.96 07/07/2021    CR 1.56 06/24/2021    CR 1.46 06/23/2021        Recent Labs   Lab 07/07/21  1404   WBC 3.8*   HGB 12.9*   HCT 39.1*   MCV 97   *     Recent Labs   Lab 07/07/21  1404      POTASSIUM 4.2   CHLORIDE 111*   CO2 25   ANIONGAP 5   *   BUN 34*   CR 1.96*   GFRESTIMATED 30*   GFRESTBLACK 34*   BRUCE 9.1   MAG 2.4*   PROTTOTAL 7.0   ALBUMIN 3.4   BILITOTAL 0.6   ALKPHOS 56   AST 16   ALT 16     Recent Labs   Lab 07/07/21  1404   INR 1.05     No results for input(s): TROPONIN, TROPI, TROPR in the last 168 hours.    Invalid input(s): TROP, TROPONINIES  No results for input(s): TSH in the last 168 hours.  Recent Labs   Lab 07/07/21  1125   COLOR Yellow   APPEARANCE Slightly Cloudy   URINEGLC Negative   URINEBILI Negative   URINEKETONE Negative   SG 1.024   UBLD Trace*   URINEPH 5.0   PROTEIN 20*   NITRITE Negative   LEUKEST Negative   RBCU 3*   WBCU 1      Imaging:   Results for orders placed or performed during the hospital encounter of 07/07/21   CT Head w/o Contrast    Narrative    CT SCAN OF THE HEAD WITHOUT CONTRAST July 7, 2021 2:42 PM     HISTORY: Head trauma, minor (Age >= 65y).    TECHNIQUE: Axial images of the head and coronal reformations without  IV contrast material. Radiation dose for this  scan was reduced using  automated exposure control, adjustment of the mA and/or kV according  to patient size, or iterative reconstruction technique.    COMPARISON: Head CT 11/15/2019.    FINDINGS: Moderate volume loss is present with bilateral mesial  temporal lobe predilection. White matter hypoattenuation likely  represents mild to moderate chronic small vessel ischemic change.  Scattered vascular calcifications. Parenchyma is otherwise  unremarkable. No evidence of acute ischemia, hemorrhage, mass, mass  effect or hydrocephalus. The visualized calvarium, tympanic cavities,  mastoid cavities, and paranasal sinuses are unremarkable. Presumed  cerumen within the bilateral external auditory canals, right worse  than left.      Impression    IMPRESSION: No acute intracranial abnormality.    NESS HERNANDEZ MD         SYSTEM ID:  [id number]

## 2021-07-10 NOTE — PLAN OF CARE
[id number]    Inpatient Progress Note:    BP (!) 157/67 (BP Location: Right arm)   Pulse 64   Temp 97.7  F (36.5  C) (Axillary)   Resp 20   Wt 65 kg (143 lb 3.2 oz)   SpO2 92%   BMI 23.11 kg/m         Orientation: Alert and oriented to self.  Neuro: Intact   Pain status: Denies any pain with interview, and No non-verbal signs of pain noted.   Activity: Assist of one   Peripheral edema: None noted   Resp: WNL  Cardiac: WNL  GI: WNL  :  WNL  Skin: Intact, Multiple bruising   LDA: No IV access   Infusions: None   Diet: Regular   Consults:SW following for placement    Discharge Plan: Elisa Mena , Monday 7/12. Daughter Meche will transport at 10-10:30am Monday  MISC: Sitter at bedside     Will continue to monitor and provide cares.     Abeba Brothers RN

## 2021-07-11 NOTE — PROGRESS NOTES
[facility]  Hospitalist Progress Note  Jeannie Sarabia PA-C, LATHA 07/11/2021    Reason for Stay (Diagnosis):  Dementia with agitation and aggression, need for safe placement         Assessment and Plan:      Summary of Stay: Kofi Haddad is a 88 year old male with history of undiagnosed dementia with aggressive/agitation, severe aortic stenosis, seizure disorder and HTN admitted on 7/7/2021 after being forcibly brought by EMS for being found outside on the ground twice in one day. In the ED he had no infectious complaints. CMP and CBC remarkable for HANY and pancytopenia. CT head negative. Not agreeable to UA and not agreeable to having IV placed. Demanding to go home but family not able to help him so initially 72 hour hold placed and he received chemical sedation.  72-hour hold was lifted on 7/9 but he is unsafe to stay at home by himself and is being placed in a memory care on 7/12.  Here in the hospital he has refused all cares, IV and medicine.     #Cognitive decline, suspect dementia with agitation and aggression  #Inability to care for himself safely at home  #Medication noncompliance  EMS was called to the patient's home due to being found on the ground 2 times on 7/7. Police had to be involved in order to forcibly bring the patient to the hospital.  He has been demanding to go home since but per family and our assessment he is unsafe to care for himself at home.  He was recently admitted 6/23 through 6/24 for weakness.  During that admission multiple different healthcare providers thought he was unable to make his own medical decisions however his daughters then signed him out AMA refusing support and vulnerable adult case was filed. Psychiatry consulted and agree patient does not have mental capacity to make own decisions but recommend removing 72 hour hold. A memory care has accepted him on 7/12.  -Psychiatry assessed and patient lacks mental capacity to make own  decisions  -Family does not want to take him home  -Memory care accepted him 7/12  -Scheduled Seroquel started on this admission and titrated. Currently on 25 mg in AM and 50 mg in the evening to control nightly outbursts. Had been having some evening symptoms still (was needing IM Haldol between 3-6p nightly and regular as needed Zyprexa doses. A scheduled Zyprexa 5mg ODT dose at 3pm started. May use haldol or zyprexa as needed.  -Limit overnight nursing cares and recommend patient sleep as much as possible     #History of severe aortic stenosis  Appears this was diagnosed back in 2019 and he declined further pursuit of valve replacement. Patient did not have syncope, but the aortic stenosis is probably playing a role in his weakness.     #Hypertension  Most checks reveal severe hypertension but patient refuses treatments.     Patient has refused all other medications for chronic illness      DVT Prophylaxis: Ambulate every shift  Code Status: DNR / DNI  Discharge Dispo: Needs higher level of care        Interval History (Subjective):      Stable. No complaints                   Physical Exam:      Last Vital Signs:  BP (!) 169/64 (BP Location: Left arm)   Pulse 50   Temp 97.7  F (36.5  C) (Oral)   Resp 16   Wt 65 kg (143 lb 3.2 oz)   SpO2 98%   BMI 23.11 kg/m      I/O last 3 completed shifts:  In: 120 [P.O.:120]  Out: -   Vitals:    07/07/21 1331   Weight: 65 kg (143 lb 3.2 oz)       Constitutional: Sleeping, no apparent distress   Respiratory: Clear to auscultation bilaterally, no crackles or wheezing   Cardiovascular: Systolic murmur    Abdomen: Normal bowel sounds, soft, non-distended, non-tender   Skin: No rashes, no cyanosis, dry to touch   Neuro: Significant memory loss, easily agitated   Extremities: No edema, normal range of motion   Other(s):        All other systems: Negative          Medications:      All current medications were reviewed with changes reflected in problem list.         Data:       All new lab and imaging data was reviewed.   Labs:  No results for input(s): CULT in the last 168 hours.  No results for input(s): PH, PHARTERIAL, PO2, [id number], SAT, PCO2, HCO3, BASEEXCESS, BULL, BEB in the last 168 hours.    Invalid input(s): [id number]       Lab Results   Component Value Date     07/07/2021     06/24/2021     06/23/2021    Lab Results   Component Value Date    CHLORIDE 111 07/07/2021    CHLORIDE 108 06/24/2021    CHLORIDE 115 06/23/2021    Lab Results   Component Value Date    BUN 34 07/07/2021    BUN 28 06/24/2021    BUN 27 06/23/2021      Lab Results   Component Value Date    POTASSIUM 4.2 07/07/2021    POTASSIUM 4.3 06/24/2021    POTASSIUM 3.6 06/23/2021    Lab Results   Component Value Date    CO2 25 07/07/2021    CO2 23 06/24/2021    CO2 21 06/23/2021    Lab Results   Component Value Date    CR 1.96 07/07/2021    CR 1.56 06/24/2021    CR 1.46 06/23/2021        Recent Labs   Lab 07/07/21  1404   WBC 3.8*   HGB 12.9*   HCT 39.1*   MCV 97   *     Recent Labs   Lab 07/07/21  1404      POTASSIUM 4.2   CHLORIDE 111*   CO2 25   ANIONGAP 5   *   BUN 34*   CR 1.96*   GFRESTIMATED 30*   GFRESTBLACK 34*   BRUCE 9.1   MAG 2.4*   PROTTOTAL 7.0   ALBUMIN 3.4   BILITOTAL 0.6   ALKPHOS 56   AST 16   ALT 16     No results for input(s): DD in the last 168 hours.  No results for input(s): SED, CRP in the last 168 hours.  Recent Labs   Lab 07/07/21  1404   INR 1.05     No results for input(s): LIPASE in the last 168 hours.  No results for input(s): TROPONIN, TROPI, TROPR in the last 168 hours.    Invalid input(s): TROP, TROPONINIES  No results for input(s): TSH in the last 168 hours.  Recent Labs   Lab 07/07/21  1125   COLOR Yellow   APPEARANCE Slightly Cloudy   URINEGLC Negative   URINEBILI Negative   URINEKETONE Negative   SG 1.024   UBLD Trace*   URINEPH 5.0   PROTEIN 20*   NITRITE Negative   LEUKEST Negative   RBCU 3*   WBCU 1      Imaging:   Results for orders  placed or performed during the hospital encounter of 07/07/21   CT Head w/o Contrast    Narrative    CT SCAN OF THE HEAD WITHOUT CONTRAST July 7, 2021 2:42 PM     HISTORY: Head trauma, minor (Age >= 65y).    TECHNIQUE: Axial images of the head and coronal reformations without  IV contrast material. Radiation dose for this scan was reduced using  automated exposure control, adjustment of the mA and/or kV according  to patient size, or iterative reconstruction technique.    COMPARISON: Head CT 11/15/2019.    FINDINGS: Moderate volume loss is present with bilateral mesial  temporal lobe predilection. White matter hypoattenuation likely  represents mild to moderate chronic small vessel ischemic change.  Scattered vascular calcifications. Parenchyma is otherwise  unremarkable. No evidence of acute ischemia, hemorrhage, mass, mass  effect or hydrocephalus. The visualized calvarium, tympanic cavities,  mastoid cavities, and paranasal sinuses are unremarkable. Presumed  cerumen within the bilateral external auditory canals, right worse  than left.      Impression    IMPRESSION: No acute intracranial abnormality.    NESS HERNANDEZ MD         SYSTEM ID:  [id number]

## 2021-07-11 NOTE — PLAN OF CARE
[id number]    Inpatient Progress Note:    /60 (BP Location: Right arm)   Pulse 72   Temp 97.9  F (36.6  C) (Oral)   Resp 16   Wt 65 kg (143 lb 3.2 oz)   SpO2 96%   BMI 23.11 kg/m       Orientation: Alert and oriented to self. Confused to time, place and situation.  Neuro: Intact   Pain status: Denies any pain with interview, and No non-verbal signs of pain noted.   Activity: Assist of one   Peripheral edema: None noted   Resp: WNL  Cardiac: WNL  GI: WNL  :  WNL  Skin: Intact, Multiple bruising   LDA: No IV access   Infusions: None   Diet: Regular   Consults:SW following for placement    Discharge Plan: Elisa Mena , Monday 7/12. Daughter Meche will transport at 10-10:30am Monday  MISC: Sitter at bedside     Will continue to monitor and provide cares.     Abeba Brothers RN

## 2021-07-11 NOTE — DOWNTIME EVENT NOTE
The EMR was down for 4.5 hours on 7/11/2021.    Abeba JEFFERY and Shira SWANSON was responsible for completing the paper charting during this time period.     The following information was re-entered into the system by Abeba Brothers RN: Intake and output, Orders and MAR    Abeba Brothers RN  7/11/2021

## 2021-07-11 NOTE — PLAN OF CARE
/66 (BP Location: Left arm)   Pulse 52   Temp 97.5  F (36.4  C) (Oral)   Resp 16   Wt 65 kg (143 lb 3.2 oz)   SpO2 97%   BMI 23.11 kg/m      Neuro: Alert and oriented to self only   Cardiac: WNL  Lungs: WNL  GI: WNL  : WNL  Pain: Denies pain  IV: No IV access okay per provider   Meds: Scheduled Seroquel and scheduled xyprexa. No IM medicines given today.    Diet: Regular   Activity: SBA w/walker gait belt   Misc: No sitter majority of the day, patient was calm and redirectable today.   Plan: SW following, Elisa Mena , Monday 7/12 daughter will transport Monday. Sitter at bedside.    Will continue to monitor and provide cares.      Rashel Colmenares RN

## 2021-07-12 NOTE — PLAN OF CARE
[id number]    Inpatient Progress Note:    /66 (BP Location: Left arm)   Pulse 52   Temp 97.5  F (36.4  C) (Oral)   Resp 16   Wt 65 kg (143 lb 3.2 oz)   SpO2 97%   BMI 23.11 kg/m         Orientation: Alert and oriented to self. Confused to time, place and situation.  Neuro: Intact   Pain status: Given PRN tylenol x 1 for being restless to r/o pain.   Activity: Assist of one   Peripheral edema: None noted   Resp: WNL  Cardiac: WNL  GI: WNL  :  WNL  Skin: Intact, Multiple bruising   LDA: No IV access   Infusions: None   Diet: Regular   Consults:SW following for placement    Discharge Plan: Elisa Mena , Monday 7/12. Daughter Meche will transport at 10-10:30am Monday  MISC: Sitter at bedside     Will continue to monitor and provide cares.     Abeba Brothers RN

## 2021-07-12 NOTE — PLAN OF CARE
/64 (BP Location: Right arm)   Pulse 50   Temp 97.6  F (36.4  C) (Oral)   Resp 16   Wt 65 kg (143 lb 3.2 oz)   SpO2 97%   BMI 23.11 kg/m      Neuro: Alert and oriented only to self  Cardiac: WNL  Lungs: WNL  GI: WNL  : WNL  Pain: denies pain   IV: No PIV access   Meds:Scheduled Seroquel and scheduled xyprexa. Sleeping all morning right up until discharge   Diet: Regular   Activity: SBA w/walker   Misc: Sitter at bedside, family is here ready to transport.   Plan: Family discharging to [facility].     Rashel Colmenares RN

## 2021-07-12 NOTE — PLAN OF CARE
Care Management Discharge Note    Discharge Date: 07/12/2021       Discharge Disposition: Other (Comments) (Went to [facility])    Discharge Services:      Discharge DME:      Discharge Transportation: family or friend will provide    Private pay costs discussed: private room/amenity fees    PAS Confirmation Code:  NA  Patient/family educated on Medicare website which has current facility and service quality ratings:  NA    Education Provided on the Discharge Plan:  yes  Persons Notified of Discharge Plans: patent and family  Patient/Family in Agreement with the Plan: yes    Handoff Referral Completed: No    Additional Information: Patient being transferred to [facility] are today. Daughter transporting.    Left several messages with the DON @ [facility] to verify that they got orders and to let them know that might be earlier than expected.    MAHI Fuentes   Inpatient Care Coordination   Supervisor  [facility]  [phone]        HOLLAND Rodriguez

## 2021-07-12 NOTE — DISCHARGE SUMMARY
Discharge Summary  Hospitalist Service      Kofi Haddad MRN# [id number]   YOB: 1933 Age: 88 year old     Date of Admission: 7/7/2021  Date of Discharge: 7/12/2021  Admitting Physician: Dexter Jones MD  Discharge Physician: Chela Aggarwal PA-C   Discharging Service: Hospitalist Service     Primary Provider: [facility], [place]  Primary Care Physician Phone Number: [phone]         Discharge Diagnoses/Problem Oriented Hospital Course (Providers):      Kofi Haddad was admitted on 7/7/2021 by Dexter Jones MD and I would refer you to their history and physical.  Briefly, Kofi Haddad is a 88 year old male with history of undiagnosed dementia with aggressive/agitation, severe aortic stenosis, seizure disorder and HTN admitted on 7/7/2021 after being forcibly brought by EMS for being found outside on the ground twice in one day. In the ED he had no infectious complaints. CMP and CBC remarkable for HANY and pancytopenia. CT head negative. Not agreeable to UA and not agreeable to having IV placed. Demanding to go home but family not able to help him so initially 72 hour hold placed and he received chemical sedation.  72-hour hold was lifted on 7/9 but he is unsafe to stay at home by himself and is being placed in a memory care on 7/12.  Here in the hospital he has refused all cares and IV placement. Intermittently agreeable to medicine.     #Cognitive decline, suspect dementia with agitation and aggression  #Inability to care for himself safely at home  #Medication noncompliance  EMS was called to the patient's home due to being found on the ground 2 times on 7/7. Police had to be involved in order to forcibly bring the patient to the hospital.  He has been demanding to go home since but per family and our assessment he is unsafe to care for himself at home.  He was recently admitted 6/23 through 6/24 for weakness.  During that admission multiple different  healthcare providers thought he was unable to make his own medical decisions however his daughters then signed him out AMA refusing support and vulnerable adult case was filed. Psychiatry consulted and agree patient does not have mental capacity to make own decisions but recommend removing 72 hour hold. A memory care has accepted him on 7/12.  -Psychiatry assessed and patient lacks mental capacity to make own decisions  -Family does not want to take him home  -Memory care accepted him 7/12  -Scheduled Seroquel started on this admission and titrated. 25 mg in AM and 50 mg in the evening along with Zyprexa 5 mg at 3 pm.  -Limit overnight nursing cares and recommend patient sleep as much as possible     #History of severe aortic stenosis  Appears this was diagnosed back in 2019 and he declined further pursuit of valve replacement.      #Hypertension  Most checks reveal severe hypertension but patient refuses treatments.     Patient has refused all other medications for chronic illness                Code Status:      DNR/DNI             Pending Results:        Unresulted Labs Ordered in the Past 30 Days of this Admission     No orders found from 6/7/2021 to 7/8/2021.               Discharge Instructions and Follow-Up:      Follow-up Appointments     Follow Up and recommended labs and tests      Follow up with Nursing home physician.  No follow up labs or test are   needed.                  Discharge Disposition:        Discharged to nursing home          Discharge Medications:        Current Discharge Medication List      START taking these medications    Details   acetaminophen (TYLENOL) 325 MG tablet Take 2 tablets (650 mg) by mouth every 4 hours as needed for mild pain  Qty: 60 tablet, Refills: 0    Associated Diagnoses: Generalized weakness      calcium carbonate (TUMS) 500 MG chewable tablet Take 1 tablet (500 mg) by mouth 3 times daily as needed for heartburn  Qty: 60 tablet, Refills: 0    Associated Diagnoses:  Gastroesophageal reflux disease without esophagitis      OLANZapine zydis (ZYPREXA) 5 MG ODT Take 1 tablet (5 mg) by mouth daily At 1500  Qty: 30 tablet, Refills: 0    Associated Diagnoses: Agitation      polyethylene glycol (MIRALAX) 17 GM/Dose powder Take 17 g by mouth daily  Qty: 510 g, Refills: 0    Associated Diagnoses: Constipation, unspecified constipation type      !! QUEtiapine (SEROQUEL) 25 MG tablet Take 1 tablet (25 mg) by mouth daily (with breakfast)  Qty: 30 tablet, Refills: 0    Associated Diagnoses: Agitation      !! QUEtiapine (SEROQUEL) 50 MG tablet Take 1 tablet (50 mg) by mouth every evening  Qty: 30 tablet, Refills: 0    Associated Diagnoses: Agitation       !! - Potential duplicate medications found. Please discuss with provider.               Allergies:       No Known Allergies         Consultations This Hospital Stay:        No consultations were requested during this admission          Condition and Physical Exam on Discharge:        Discharge condition: Stable   Discharge vitals: Blood pressure 139/64, pulse 50, temperature 97.6  F (36.4  C), temperature source Oral, resp. rate 16, weight 65 kg (143 lb 3.2 oz), SpO2 97 %.     Constitutional: Alert but not orientated     Lungs: CTAB   Cardiovascular: RRR with loud systolic murmur   Abdomen: Abdomen is not tender to palpation   Skin: No rash or open sores   Other:            Discharge Orders for Skilled Facility (from Discharge Orders):        After Care Instructions     Activity - Up with nursing assistance      Advance Diet as Tolerated      Follow this diet upon discharge: Regular         Fall precautions      General info for SNF      Length of Stay Estimate: Long Term Care  Condition at Discharge: Stable  Level of care:board and care  Rehabilitation Potential: Poor  Admission H&P remains valid and up-to-date: Yes  Recent Chemotherapy: N/A  Use Nursing Home Standing Orders: Yes         Mantoux instructions      Give two-step Mantoux (PPD)  Per Facility Policy Yes                    Rehab orders for Skilled Facility (from Discharge Orders):               Discharge Time:      Less than 30 minutes.        Image Results From This Hospital Stay (For Non-EPIC Providers):        Results for orders placed or performed during the hospital encounter of 07/07/21   CT Head w/o Contrast    Narrative    CT SCAN OF THE HEAD WITHOUT CONTRAST July 7, 2021 2:42 PM     HISTORY: Head trauma, minor (Age >= 65y).    TECHNIQUE: Axial images of the head and coronal reformations without  IV contrast material. Radiation dose for this scan was reduced using  automated exposure control, adjustment of the mA and/or kV according  to patient size, or iterative reconstruction technique.    COMPARISON: Head CT 11/15/2019.    FINDINGS: Moderate volume loss is present with bilateral mesial  temporal lobe predilection. White matter hypoattenuation likely  represents mild to moderate chronic small vessel ischemic change.  Scattered vascular calcifications. Parenchyma is otherwise  unremarkable. No evidence of acute ischemia, hemorrhage, mass, mass  effect or hydrocephalus. The visualized calvarium, tympanic cavities,  mastoid cavities, and paranasal sinuses are unremarkable. Presumed  cerumen within the bilateral external auditory canals, right worse  than left.      Impression    IMPRESSION: No acute intracranial abnormality.    NESS HERNANDEZ MD         SYSTEM ID:  [id number]           Most Recent Lab Results In EPIC (For Non-EPIC Providers):    Most Recent 3 CBC's:  Recent Labs   Lab Test 07/07/21  1404 06/24/21  0800 06/23/21  1758   WBC 3.8* 6.4 4.9   HGB 12.9* 13.4 13.1*   MCV 97 95 95   * 161 150      Most Recent 3 BMP's:  Recent Labs   Lab Test 07/07/21  1404 06/24/21  0800 06/23/21  1758    136 142   POTASSIUM 4.2 4.3 3.6   CHLORIDE 111* 108 115*   CO2 25 23 21   BUN 34* 28 27   CR 1.96* 1.56* 1.46*   ANIONGAP 5 5 6   BRUCE 9.1 9.0 7.5*   * 84 88     Most Recent  3 Troponin's:No lab results found.  Most Recent 3 INR's:  Recent Labs   Lab Test 07/07/21  1404   INR 1.05     Most Recent 2 LFT's:  Recent Labs   Lab Test 07/07/21  1404 06/24/21  0800   AST 16 20   ALT 16 22   ALKPHOS 56 71   BILITOTAL 0.6 0.8     Most Recent Cholesterol Panel:  Recent Labs   Lab Test 10/01/18  0614   CHOL 149   LDL 72   HDL 44   TRIG 165*     Most Recent 6 Bacteria Isolates From Any Culture (See EPIC Reports for Culture Details):  Recent Labs   Lab Test 11/15/19  2012 11/15/19  1950   CULT No growth No growth     Most Recent TSH, T4 and HgbA1c:No lab results found.

## 2021-07-12 NOTE — PLAN OF CARE
Patient's After Visit Summary was reviewed with patient and/or family. All orders faxed over to El[facility] and family also given a packet as well.   Patient verbalized understanding of After Visit Summary, recommended follow up and was given an opportunity to ask questions.   Discharge medications sent home with patient/family: Not applicable   Discharged with daughters. Daughters are transporting patient to facility.

## 2021-07-13 PROBLEM — N18.30 CHRONIC KIDNEY DISEASE, STAGE 3 (H): Status: ACTIVE | Noted: 2021-01-01

## 2021-07-13 NOTE — Clinical Note
[facility]  Health Care Home  Patient Care Plan  About Me  Patient Name:  Kofi Haddad    YOB: 1933  Age:   88 year old   [facility] MRN: [id number] Telephone Information:   Home Phone [phone]   Mobile Not on file.       Address:    [place] [address]  [address]  [facility] [address] Email address:  No e-mail address on record      Emergency Contact(s)  Name Relationship Lgl Grd Work Phone Home Phone Mobile Phone   1. ABDON REES Daughter No  [phone]    2. KRISH JACOBS Daughter No  [phone]           Primary language:  English     needed?     [facility]:  [phone] op. 1  Other communication barriers:    Current living arrangement:    Mobility Status/ Medical Equipment:    Other information to know about me:      Health Maintenance  Immunizations:     Cancer Screening:       My Access Plan  Medical Emergency 911   Primary Clinic Line     24/7 After Hours Line [phone]   Preferred Hospital     Preferred Pharmacy [facility] - [place], [place] - [address]     Behavioral Health Crisis Line Crisis Connection, [phone] or 911     My Care Team Members  Patient Care Team       Relationship Specialty Notifications Start End    Ovidio Dallas APRN CNP PCP - General Family Medicine All results, Admissions 7/12/21     Phone: [phone] Fax: [phone]         [address] [place] [address] [address] [facility] [address]    [facility] ([facility] ([place]  11/16/19     Phone: [phone]         Nafisa Pham MD MD Internal Medicine  7/12/21     Phone: [phone] Fax: [phone]         [address] [facility] [address] [facility] [address]    Mayda Zuniga Nursing Assistant Geriatric Medicine All results, Admissions 7/12/21     Geriatric Services             My Care Plans  Self Management and Treatment Plan      Action Plans on File:    Advance Care  Plans/Directives on file:                My Medical and Care Information  Problem List   Patient Active Problem List   Diagnosis    Syncope and collapse    Fever    Community acquired pneumonia, bilateral    Generalized weakness    Hypertensive urgency    Falls frequently    Unable to care for self      Current Medications and Allergies:  See printed Medication Report.    Health Care Home Complexity Tier:      Care Coordination Start Date:     Form Last Updated: 07/13/2021

## 2021-07-13 NOTE — PROGRESS NOTES
[facility]  PRIMARY CARE PROVIDER AND CLINIC:  Ovidio Dallas, APRN CNP, [address] / [place] [address]  Chief Complaint   Patient presents with     Establish Care     [facility] Medical Record Number:  [id number]  Place of Service where encounter took place:  [facility] ([facility]) [233]    Kofi Haddad  is a 88 year old  (5/14/1933), admitted to the above facility from home due to care needs.  Admitted to this facility for  rehab, medical management and nursing care.    HPI:    HPI information obtained from: facility chart records, facility staff, patient report and [facility] chart review.   Brief Summary of Hospital Course:     HTN.  ED visit 6/23/21 for Hypertensive crisis. SBP 220s.. bradycardia with HRs 40s.  tx with hydralazine in ED.  Did not want to stay, left AMA.  VA filed. BPs currently stable. Not currently taking BP meds.   Has taken norvasc, lisinopril in past.    Dementia:  Memory loss, unable to care for self at home setting.  Had falls, suspected to not be taking meds correctly. 7/7/21 had fall, taken to ED by Police.  Had 72hr hold. Per eval, determined to able to make own medical decisions due to cognitive deficit.  Had agitation, aggressive behaviors.  Started on zyprexa, seroquel. dc'd from hosp. To [facility] memory care unit.  Per staff, exit-seeking behaviors, typically redirectable.  Also has hospice starting today.  Comfort cares.    Seizure disorder.  Onset 2002 per chart.  Has taken dilantin in past.  No recent reports of seizure activity.     Falls:  ED visits s/p falls last month, earlier this month.  Currently using walker.  Per staff, has had steady gait with walker.  No falls since admission.        Updates on Status Since Skilled nursing Admission: po intake stable. Agitation at times    CODE STATUS/ADVANCE DIRECTIVES DISCUSSION:   DNR / DNI  Patient's living condition: lives alone  ALLERGIES: Patient has no known  allergies.  PAST MEDICAL HISTORY:  has a past medical history of Cancer (H), Chronic kidney disease, High cholesterol, Hypertension, and Seizures (H).  PAST SURGICAL HISTORY:   has a past surgical history that includes Abdomen surgery.  FAMILY HISTORY: family history is not on file.  SOCIAL HISTORY:   reports that he has quit smoking. He has never used smokeless tobacco.    Post Discharge Medication Reconciliation Status: discharge medications reconciled and changed, per note/orders    Current Outpatient Medications   Medication Sig Dispense Refill     acetaminophen (TYLENOL) 325 MG tablet Take 2 tablets (650 mg) by mouth every 4 hours as needed for mild pain 60 tablet 0     calcium carbonate (TUMS) 500 MG chewable tablet Take 1 tablet (500 mg) by mouth 3 times daily as needed for heartburn 60 tablet 0     LORazepam (ATIVAN) 0.5 MG tablet Take 0.5 mg by mouth every 4 hours as needed for anxiety       OLANZapine zydis (ZYPREXA) 5 MG ODT Take 1 tablet (5 mg) by mouth daily At 1500 30 tablet 0     polyethylene glycol (MIRALAX) 17 GM/Dose powder Take 17 g by mouth daily 510 g 0     QUEtiapine (SEROQUEL) 25 MG tablet Take 25 mg by mouth daily as needed       QUEtiapine (SEROQUEL) 25 MG tablet Take 1 tablet (25 mg) by mouth daily (with breakfast) 30 tablet 0     QUEtiapine (SEROQUEL) 50 MG tablet Take 1 tablet (50 mg) by mouth every evening 30 tablet 0         ROS:  Unobtainable secondary to cognitive impairment. Reports feeling ok today.    Vitals:  /69   Pulse 80   Resp 18   Wt 64.9 kg (143 lb)   SpO2 94%   BMI 23.08 kg/m    Exam:  GENERAL APPEARANCE:  Alert, in no distress, cooperative  ENT:  Mouth and posterior oropharynx normal, moist mucous membranes, [place]  EYES:  EOM, conjunctivae, lids, pupils and irises normal, PERRL  NECK:  No adenopathy,masses or thyromegaly, no carotid bruit  RESP:  respiratory effort and palpation of chest normal, lungs clear to auscultation , no respiratory distress  CV:  Palpation  and auscultation of heart done , no edema, rate-normal, grade 3/6 murmur  ABDOMEN:  normal bowel sounds, soft, nontender, no hepatosplenomegaly or other masses, no guarding or rebound, no bruits  M/S:   gait steady with walker.  muscle strength 5/5 all 4 ext., normal tone  SKIN:  bruising R antecubital area  NEURO:   Cranial nerves 2-12 are normal tested and grossly at patient's baseline, speech clear, no tremor  PSYCH:  insight and judgement impaired, memory impaired , affect abnormal -flat    Lab/Diagnostic data:    Most Recent 3 CBC's:Recent Labs   Lab Test 07/07/21  1404 06/24/21  0800 06/23/21  1758   WBC 3.8* 6.4 4.9   HGB 12.9* 13.4 13.1*   MCV 97 95 95   * 161 150     Most Recent 3 BMP's:Recent Labs   Lab Test 07/07/21  1404 06/24/21  0800 06/23/21  1758    136 142   POTASSIUM 4.2 4.3 3.6   CHLORIDE 111* 108 115*   CO2 25 23 21   BUN 34* 28 27   CR 1.96* 1.56* 1.46*   ANIONGAP 5 5 6   BRUCE 9.1 9.0 7.5*   * 84 88       ASSESSMENT/PLAN:  Hypertension, unspecified type  CKD stage 3.  BPs currently stable  1. Follow BPs, HRs  2. For elevated BPs, may consider norvasc, would avoid lisinopril due to RF  3. Encourage fluids    Dementia with behavioral disturbance, unspecified dementia type (H)  Memory loss. Recent hosp. Stay for aggressive behaviors, agitation. Unable to care for self, make health care decisions.   1. Cont. zyprexa  2. Cont. Sched. seroquel  3. Add prn seroquel dose  4. Monitor for increase exit seeking behaviors, agitation, not responsive to staff redirection  5. Monitor for aggressive behaviors  6. Start hospice cares    Seizure disorder (H)  No reported recent seizure activity per chart. No longer taking dilantin  1. Start prn ativan  2. Monitor for changes in neuros, seizure activity  3. For above, may consider sched ativan, restarting dilantin    Recurrent falls  S/p fall with ED visits last month, earlier this month. No falls since admission  1. Encourage fluids as  above  2. Remind to use walker at all times  3. Encourage amb. As radha  4. Monitor for reports of pain    ACP (advance care planning)  Comfort cares.  DNR, DNI. POLST in chart       Electronically signed by:  HENOK Dillon CNP

## 2021-07-13 NOTE — LETTER
7/13/2021        RE: Kofi Haddad  [facility]  [address]  [address]  [facility] [address]        [employer] [facility]  PRIMARY CARE PROVIDER AND CLINIC:  HENOK Dillon CNP, [address] / [place] [address]  Chief Complaint   Patient presents with     [facility] Care     [employer] Medical Record Number:  [id number]  Place of Service where encounter took place:  [facility] ([facility]) [233]    Kofi Haddad  is a 88 year old  (5/14/1933), admitted to the above facility from home due to care needs.  Admitted to this facility for  rehab, medical management and nursing care.    HPI:    HPI information obtained from: facility chart records, facility staff, patient report and [facility] chart review.   Brief Summary of Hospital Course:     HTN.  ED visit 6/23/21 for Hypertensive crisis. SBP 220s.. bradycardia with HRs 40s.  tx with hydralazine in ED.  Did not want to stay, left AMA.  VA filed. BPs currently stable. Not currently taking BP meds.   Has taken norvasc, lisinopril in past.    Dementia:  Memory loss, unable to care for self at home setting.  Had falls, suspected to not be taking meds correctly. 7/7/21 had fall, taken to ED byh Police.  Had 72hr hold. Per eval, determined to able to make own medical decisions due to cognitive deficit.  Had agitation, aggressive behaviors.  Started on zyprexa, seroquel. dc'd from hosp. To [facility] memory care unit.  Per staff, exit-seeking behaviors, typically redirectable.  Also has hospice starting today.  Comfort cares.    Seizure disorder.  Onset 2002 per chart.  Has taken dilantin in past.  No recent reports of seizure activity.     Falls:  ED visits s/p falls last month, earlier this month.  Currently using walker.  Per staff, has had steady gait with walker.  No falls since admission.        Updates on Status Since Skilled nursing Admission: po intake stable. Agitation at times    CODE  STATUS/ADVANCE DIRECTIVES DISCUSSION:   DNR / DNI  Patient's living condition: lives alone  ALLERGIES: Patient has no known allergies.  PAST MEDICAL HISTORY:  has a past medical history of Cancer (H), Chronic kidney disease, High cholesterol, Hypertension, and Seizures (H).  PAST SURGICAL HISTORY:   has a past surgical history that includes Abdomen surgery.  FAMILY HISTORY: family history is not on file.  SOCIAL HISTORY:   reports that he has quit smoking. He has never used smokeless tobacco.    Post Discharge Medication Reconciliation Status: discharge medications reconciled and changed, per note/orders    Current Outpatient Medications   Medication Sig Dispense Refill     acetaminophen (TYLENOL) 325 MG tablet Take 2 tablets (650 mg) by mouth every 4 hours as needed for mild pain 60 tablet 0     calcium carbonate (TUMS) 500 MG chewable tablet Take 1 tablet (500 mg) by mouth 3 times daily as needed for heartburn 60 tablet 0     LORazepam (ATIVAN) 0.5 MG tablet Take 0.5 mg by mouth every 4 hours as needed for anxiety       OLANZapine zydis (ZYPREXA) 5 MG ODT Take 1 tablet (5 mg) by mouth daily At 1500 30 tablet 0     polyethylene glycol (MIRALAX) 17 GM/Dose powder Take 17 g by mouth daily 510 g 0     QUEtiapine (SEROQUEL) 25 MG tablet Take 25 mg by mouth daily as needed       QUEtiapine (SEROQUEL) 25 MG tablet Take 1 tablet (25 mg) by mouth daily (with breakfast) 30 tablet 0     QUEtiapine (SEROQUEL) 50 MG tablet Take 1 tablet (50 mg) by mouth every evening 30 tablet 0         ROS:  Unobtainable secondary to cognitive impairment. Reports feeling ok today.    Vitals:  /69   Pulse 80   Resp 18   Wt 64.9 kg (143 lb)   SpO2 94%   BMI 23.08 kg/m    Exam:  GENERAL APPEARANCE:  Alert, in no distress, cooperative  ENT:  Mouth and posterior oropharynx normal, moist mucous membranes, [place]  EYES:  EOM, conjunctivae, lids, pupils and irises normal, PERRL  NECK:  No adenopathy,masses or thyromegaly, no carotid  bruit  RESP:  respiratory effort and palpation of chest normal, lungs clear to auscultation , no respiratory distress  CV:  Palpation and auscultation of heart done , no edema, rate-normal, grade 3/6 murmur  ABDOMEN:  normal bowel sounds, soft, nontender, no hepatosplenomegaly or other masses, no guarding or rebound, no bruits  M/S:   gait steady with walker.  muscle strength 5/5 all 4 ext., normal tone  SKIN:  bruising R antecubital area  NEURO:   Cranial nerves 2-12 are normal tested and grossly at patient's baseline, speech clear, no tremor  PSYCH:  insight and judgement impaired, memory impaired , affect abnormal -flat    Lab/Diagnostic data:    Most Recent 3 CBC's:Recent Labs   Lab Test 07/07/21  1404 06/24/21  0800 06/23/21  1758   WBC 3.8* 6.4 4.9   HGB 12.9* 13.4 13.1*   MCV 97 95 95   * 161 150     Most Recent 3 BMP's:Recent Labs   Lab Test 07/07/21  1404 06/24/21  0800 06/23/21  1758    136 142   POTASSIUM 4.2 4.3 3.6   CHLORIDE 111* 108 115*   CO2 25 23 21   BUN 34* 28 27   CR 1.96* 1.56* 1.46*   ANIONGAP 5 5 6   BRUCE 9.1 9.0 7.5*   * 84 88       ASSESSMENT/PLAN:  Hypertension, unspecified type  CKD stage 3.  BPs currently stable  1. Follow BPs, HRs  2. For elevated BPs, may consider norvasc, would avoid lisinopril due to RF  3. Encourage fluids    Dementia with behavioral disturbance, unspecified dementia type (H)  Memory loss. Recent hosp. Stay for aggressive behaviors, agitation. Unable to care for self, make health care decisions.   1. Cont. zyprexa  2. Cont. Sched. seroquel  3. Add prn seroquel dose  4. Monitor for increase exit seeking behaviors, agitation, not responsive to staff redirection  5. Monitor for aggressive behaviors  6. Start hospice cares    Seizure disorder (H)  No reported recent seizure activity per chart. No longer taking dilantin  1. Start prn ativan  2. Monitor for changes in neuros, seizure activity  3. For above, may consider sched ativan, restarting  dilantin    Recurrent falls  S/p fall with ED visits last month, earlier this month. No falls since admission  1. Encourage fluids as above  2. Remind to use walker at all times  3. Encourage amb. As radha  4. Monitor for reports of pain    ACP (advance care planning)  Comfort cares.  DNR, DNI. POLST in chart       Electronically signed by:  HENOK Dillon CNP                           Sincerely,        HENOK Dillon CNP

## 2021-08-04 NOTE — LETTER
8/4/2021        RE: Kofi Haddad  [facility]  [address]  [address]  [facility] [address]        Kofi Haddad is a 88 year old male seen August 4, 2021 at [facility] where he has resided for one month (admit 7/2021)  He is seen in his room with his daughter Genie present.   He is resting abed, a little disengaged from the visit but does answer a few questions.  States he is feeling well, denies any current pain, dyspnea or other symptoms.   AL nursing staff reports pt is adjusting to unit surprisingly well.  He had some early exit-seeking, but redirectable   No behavioral disturbance.   By chart review, pt with severe aortic stenosis, seizure d/o, HTN and had been living alone in [place] but wandering away from home.   In June pt was brought in to [facility] by EMS after being found sitting under a tree.  Systolic bp 230 and HR in the 40s  He was treated with hydralazine, and improved.  Daughters checked pt out of hospital AMA the next day despite universal recommendations for 24 hour supervision.     Pt was hospitalized again a couple of weeks later after being found outdoors on the ground twice in one day.   A [facility] and [facility].     Pt determined to be unsafe at home alone. During hospitalization he was agitated and aggressive, refused all cares and IV placement.  He was seen by Psychiatry and determined to not have decision making capacity.  He was managed with quetiapine and olanzapine, discharged to AL on Hospice care.        Pt has history of severe aortic stenosis, has refused repair.   Also HTN and h/o seizure disorder, not on medications. He had 3 hospitalization in 2018-19, but did not agree to treatment and left AMA.   Goals of care c/w minimal intervention and no rehospitalization.       Past Medical History:   Diagnosis Date     Cancer (H)      Chronic kidney disease, 3b      High cholesterol       Hypertension      Seizures (H)    Severe aortic stenosis  Late onset dementia, Alzheimer's type    SH:  x10 years.  Previously lived alone, house in [place].   He has 2 daughters, Genie and Meche  Past smoker    EXAM:  NAD  BP (!) 145/95   Pulse 78   Temp 98  F (36.7  C)   Resp 17   Wt 64.4 kg (142 lb)   BMI 22.92 kg/m     Neck supple without adenopathy, minimal forward flexion  +kyphosis  Lungs clear bilaterally with fair air movement  Heart RRR s1s2, III/VI harsh EAMON  Abd soft, NT, no distention or guarding, +BS  Ext without edema  Neuro: limited history, +STML and disorientation  Psych: affect okay     Last Comprehensive Metabolic Panel:  Sodium   Date Value Ref Range Status   07/07/2021 141 133 - 144 mmol/L Final     Potassium   Date Value Ref Range Status   07/07/2021 4.2 3.4 - 5.3 mmol/L Final     Chloride   Date Value Ref Range Status   07/07/2021 111 (H) 94 - 109 mmol/L Final     Carbon Dioxide   Date Value Ref Range Status   07/07/2021 25 20 - 32 mmol/L Final     Anion Gap   Date Value Ref Range Status   07/07/2021 5 3 - 14 mmol/L Final     Glucose   Date Value Ref Range Status   07/07/2021 126 (H) 70 - 99 mg/dL Final     Urea Nitrogen   Date Value Ref Range Status   07/07/2021 34 (H) 7 - 30 mg/dL Final     Creatinine   Date Value Ref Range Status   07/07/2021 1.96 (H) 0.66 - 1.25 mg/dL Final     GFR Estimate   Date Value Ref Range Status   07/07/2021 30 (L) >60 mL/min/[1.73_m2] Final     Comment:     Non  GFR Calc  Starting 12/18/2018, serum creatinine based estimated GFR (eGFR) will be   calculated using the Chronic Kidney Disease Epidemiology Collaboration   (CKD-EPI) equation.       Calcium   Date Value Ref Range Status   07/07/2021 9.1 8.5 - 10.1 mg/dL Final     Lab Results   Component Value Date    AST 16 07/07/2021      ALBUMIN 3.4 07/07/2021     Lab Results   Component Value Date    PROTTOTAL 7.0 07/07/2021      Lab Results   Component Value Date    ALKPHOS 56 07/07/2021      Lab Results   Component Value Date    WBC 3.8 07/07/2021      HGB 12.9 07/07/2021      MCV 97 07/07/2021       07/07/2021     CT SCAN OF THE HEAD WITHOUT CONTRAST July 7, 2021 2:42 PM   FINDINGS: Moderate volume loss is present with bilateral mesial  temporal lobe predilection. White matter hypoattenuation likely  represents mild to moderate chronic small vessel ischemic change.  Scattered vascular calcifications. Parenchyma is otherwise  unremarkable. No evidence of acute ischemia, hemorrhage, mass, mass  effect or hydrocephalus. The visualized calvarium, tympanic cavities,  mastoid cavities, and paranasal sinuses are unremarkable. Presumed  cerumen within the bilateral external auditory canals, right worse than left.     ECHO 9/2018:   Interpretation Summary  Severe valvular aortic stenosis, valve area 1.1 cm2  The visual ejection fraction is estimated at 60-65%.  Left ventricular systolic function is normal.  _____________________________________________________________________________         IMP/PLAN:   (I10) Essential hypertension    (N18.32) Stage 3b chronic kidney disease  Comment: GFR 30  BP Readings from Last 3 Encounters:   08/04/21 (!) 145/95   07/13/21 130/69   07/12/21 139/64      Plan: not currently on anti-hypertensives secondary to fall risk and goals of care.       (I35.0) Severe aortic stenosis  Comment: multiple episodes of syncope in past few years   Plan: has been seen in Cardiology clinic and declined TAVR or open repair.      (G40.909) Seizure disorder (H)  Comment: pt was previously on phenytoin, but self-discontinued some time in the past couple of years.      Plan: No reported sz activity in years; follow off meds.       (F03.91) Dementia with behavioral disturbance, unspecified dementia type (H)  Comment: significant cognitive decline and low functional status   Plan: Memory Care AL for support with medications, meals, activity and secure unit.     Remains on PTA olanzapine 5  mg /day, and quetiapine 25 mg AM and 50 mg PM.     Once pt settled could look to simplify this regimen, wean off olanzapine.      (Z51.5) Hospice care patient  Comment: comfort focus, pt does not wish rehospitalization or interventions  Plan: prns available       Nafisa Pham MD         Sincerely,        Nafisa Pham MD

## 2021-08-04 NOTE — Clinical Note
[facility]  Health Care Home  Patient Care Plan  About Me  Patient Name:  Kofi Haddad    YOB: 1933  Age:   88 year old   [place] MRN: [id number] Telephone Information:   Home Phone [phone]   Mobile Not on file.       Address:    [place] [address]  [address]  [facility] [address] Email address:  No e-mail address on record      Emergency Contact(s)  Name Relationship Lgl Grd Work Phone Home Phone Mobile Phone   1. ABDON REES Daughter No  [phone]    2. KRISH JACOBS Daughter No  [phone]           Primary language:  English     needed?     [place] [facility]:  [phone] op. 1  Other communication barriers:    Current living arrangement:    Mobility Status/ Medical Equipment:    Other information to know about me:      Health Maintenance  Immunizations:     Cancer Screening:       My Access Plan  Medical Emergency 911   Primary Clinic Line     24/7 After Hours Line [phone]   Preferred Hospital     Preferred Pharmacy [facility] - [place], [place] - [address]     Behavioral Health Crisis Line [facility], [phone] or 911     My Care Team Members  Patient Care Team       Relationship Specialty Notifications Start End    Ovidio [place] APRN CNP PCP - General Family Medicine All results, Admissions 7/12/21     Phone: [phone] Fax: [phone]         [address] [place] [address]    [facility] ([facility] ([place])  11/16/19     Phone: [phone]         Ovidio [place] APRN CNP Assigned PCP   6/16/21     Phone: [phone] Fax: [phone]         [address] [facility] [address] [facility] [address]    Nafisa Pham MD MD Internal Medicine  7/12/21     Phone: [phone] Fax: [phone]         [address] [place] [address] [address] [facility] [address]    Mayda Zuniga Nursing Assistant Geriatric Medicine All results, Admissions  7/12/21     Geriatric Services             My Care Plans  Self Management and Treatment Plan      Action Plans on File:    Advance Care Plans/Directives on file:                My Medical and Care Information  Problem List   Patient Active Problem List   Diagnosis    Syncope and collapse    Fever    Community acquired pneumonia, bilateral    Generalized weakness    Hypertensive urgency    Falls frequently    Unable to care for self    Chronic kidney disease, stage 3      Current Medications and Allergies:  See printed Medication Report.    Health Care Home Complexity Tier:      Care Coordination Start Date:     Form Last Updated: 08/04/2021

## 2021-08-04 NOTE — PROGRESS NOTES
Kofi Haddad is a 88 year old male seen August 4, 2021 at [facility] where he has resided for one month (admit 7/2021)  He is seen in his room with his daughter Genie present.   He is resting abed, a little disengaged from the visit but does answer a few questions.  States he is feeling well, denies any current pain, dyspnea or other symptoms.   AL nursing staff reports pt is adjusting to unit surprisingly well.  He had some early exit-seeking, but redirectable   No behavioral disturbance.   By chart review, pt with severe aortic stenosis, seizure d/o, HTN and had been living alone in [place] but wandering away from home.   In June pt was brought in to [facility] by EMS after being found sitting under a tree.  Systolic bp 230 and HR in the 40s  He was treated with hydralazine, and improved.  Daughters checked pt out of hospital AMA the next day despite universal recommendations for 24 hour supervision.     Pt was hospitalized again a couple of weeks later after being found outdoors on the ground twice in one day.   A hold was placed and [facility] report filed.     Pt determined to be unsafe at home alone. During hospitalization he was agitated and aggressive, refused all cares and IV placement.  He was seen by Psychiatry and determined to not have decision making capacity.  He was managed with quetiapine and olanzapine, discharged to AL on Hospice care.        Pt has history of severe aortic stenosis, has refused repair.   Also HTN and h/o seizure disorder, not on medications. He had 3 hospitalization in 2018-19, but did not agree to treatment and left AMA.   Goals of care c/w minimal intervention and no rehospitalization.       Past Medical History:   Diagnosis Date     Cancer (H)      Chronic kidney disease, 3b      High cholesterol      Hypertension      Seizures (H)    Severe aortic stenosis  Late onset dementia, Alzheimer's type    SH:  x10 years.  Previously  lived alone, house in [place].   He has 2 daughters, Genie and Meche  Past smoker    EXAM:  NAD  BP (!) 145/95   Pulse 78   Temp 98  F (36.7  C)   Resp 17   Wt 64.4 kg (142 lb)   BMI 22.92 kg/m     Neck supple without adenopathy, minimal forward flexion  +kyphosis  Lungs clear bilaterally with fair air movement  Heart RRR s1s2, III/VI harsh EAMON  Abd soft, NT, no distention or guarding, +BS  Ext without edema  Neuro: limited history, +STML and disorientation  Psych: affect okay     Last Comprehensive Metabolic Panel:  Sodium   Date Value Ref Range Status   07/07/2021 141 133 - 144 mmol/L Final     Potassium   Date Value Ref Range Status   07/07/2021 4.2 3.4 - 5.3 mmol/L Final     Chloride   Date Value Ref Range Status   07/07/2021 111 (H) 94 - 109 mmol/L Final     Carbon Dioxide   Date Value Ref Range Status   07/07/2021 25 20 - 32 mmol/L Final     Anion Gap   Date Value Ref Range Status   07/07/2021 5 3 - 14 mmol/L Final     Glucose   Date Value Ref Range Status   07/07/2021 126 (H) 70 - 99 mg/dL Final     Urea Nitrogen   Date Value Ref Range Status   07/07/2021 34 (H) 7 - 30 mg/dL Final     Creatinine   Date Value Ref Range Status   07/07/2021 1.96 (H) 0.66 - 1.25 mg/dL Final     GFR Estimate   Date Value Ref Range Status   07/07/2021 30 (L) >60 mL/min/[1.73_m2] Final     Comment:     Non  GFR Calc  Starting 12/18/2018, serum creatinine based estimated GFR (eGFR) will be   calculated using the Chronic Kidney Disease Epidemiology Collaboration   (CKD-EPI) equation.       Calcium   Date Value Ref Range Status   07/07/2021 9.1 8.5 - 10.1 mg/dL Final     Lab Results   Component Value Date    AST 16 07/07/2021      ALBUMIN 3.4 07/07/2021     Lab Results   Component Value Date    PROTTOTAL 7.0 07/07/2021      Lab Results   Component Value Date    ALKPHOS 56 07/07/2021     Lab Results   Component Value Date    WBC 3.8 07/07/2021      HGB 12.9 07/07/2021      MCV 97 07/07/2021       07/07/2021      CT SCAN OF THE HEAD WITHOUT CONTRAST July 7, 2021 2:42 PM   FINDINGS: Moderate volume loss is present with bilateral mesial  temporal lobe predilection. White matter hypoattenuation likely  represents mild to moderate chronic small vessel ischemic change.  Scattered vascular calcifications. Parenchyma is otherwise  unremarkable. No evidence of acute ischemia, hemorrhage, mass, mass  effect or hydrocephalus. The visualized calvarium, tympanic cavities,  mastoid cavities, and paranasal sinuses are unremarkable. Presumed  cerumen within the bilateral external auditory canals, right worse than left.     ECHO 9/2018:   Interpretation Summary  Severe valvular aortic stenosis, valve area 1.1 cm2  The visual ejection fraction is estimated at 60-65%.  Left ventricular systolic function is normal.  _____________________________________________________________________________         IMP/PLAN:   (I10) Essential hypertension    (N18.32) Stage 3b chronic kidney disease  Comment: GFR 30  BP Readings from Last 3 Encounters:   08/04/21 (!) 145/95   07/13/21 130/69   07/12/21 139/64      Plan: not currently on anti-hypertensives secondary to fall risk and goals of care.       (I35.0) Severe aortic stenosis  Comment: multiple episodes of syncope in past few years   Plan: has been seen in Cardiology clinic and declined TAVR or open repair.      (G40.909) Seizure disorder (H)  Comment: pt was previously on phenytoin, but self-discontinued some time in the past couple of years.      Plan: No reported sz activity in years; follow off meds.       (F03.91) Dementia with behavioral disturbance, unspecified dementia type (H)  Comment: significant cognitive decline and low functional status   Plan: Memory Care AL for support with medications, meals, activity and secure unit.     Remains on PTA olanzapine 5 mg /day, and quetiapine 25 mg AM and 50 mg PM.     Once pt settled could look to simplify this regimen, wean off olanzapine.       (Z51.5) Hospice care patient  Comment: comfort focus, pt does not wish rehospitalization or interventions  Plan: prns available       Nafisa Pham MD

## 2021-09-24 NOTE — LETTER
9/24/2021        RE: Kofi Mena [facility]  [address]  [address]  [facility] [address]        [place] [facility]  [place] Medical Record Number:  [id number]  Place of Service where encounter took place:  [facility] (care home) [[id number]]  Chief Complaint   Patient presents with     Constipation     Dementia       HPI:    Kofi Haddad  is a 88 year old (5/14/1933), who is being seen today for an episodic care visit.  HPI information obtained from: facility chart records, facility staff, patient report, [facility] chart review and family/first contact dtr report. Today's concern is:  Constipation, dementia, HTN.  For constipation cont. On miralax every day.  Per staff has occ increased s/s.  Reported last BM yesterday.  Has had adequate fluid intake.  Memory loss due to dementia.  H/o aggressive behaviors, exit-seeking behaviors.  Cont. On hospice cares.  Taking seroquel, zyprexa. Refuses walker at times.  No recent falls. Can be verbally aggressive towards staff, ongoing exit-seeking behaviors. H/o HTN.  Elevated BP 6/21 with ED visit. tx with hydralazine.  Left AMA. BPs, HRs stable. Not currently taking CV med.     Past Medical and Surgical History reviewed in Epic today.    MEDICATIONS:    Current Outpatient Medications   Medication Sig Dispense Refill     acetaminophen (TYLENOL) 325 MG tablet Take 2 tablets (650 mg) by mouth every 4 hours as needed for mild pain 60 tablet 0     calcium carbonate (TUMS) 500 MG chewable tablet Take 1 tablet (500 mg) by mouth 3 times daily as needed for heartburn 60 tablet 0     OLANZapine zydis (ZYPREXA) 5 MG ODT Take 1 tablet (5 mg) by mouth daily At 1500 30 tablet 0     polyethylene glycol (MIRALAX) 17 GM/Dose powder Take 17 g by mouth daily 510 g 0     QUEtiapine (SEROQUEL) 25 MG tablet Take 1 tablet (25 mg) by mouth daily (with breakfast) 30 tablet 0     QUEtiapine (SEROQUEL) 50 MG tablet Take 1 tablet (50 mg) by mouth  every evening 30 tablet 0         REVIEW OF SYSTEMS:  Unobtainable secondary to cognitive impairment. Reports feeling ok today    Objective:  /72   Pulse 81   Resp 16   SpO2 95%   Exam:  GENERAL APPEARANCE:  Alert, in no distress  ENT:  Mouth and posterior oropharynx normal, moist mucous membranes, [place]  EYES:  EOM, conjunctivae, lids, pupils and irises normal, PERRL  RESP:  respiratory effort and palpation of chest normal, lungs clear to auscultation , no respiratory distress  CV:  Palpation and auscultation of heart done , no edema, rate-normal, grade 3/6 murmur  ABDOMEN:  normal bowel sounds, soft, nontender, no hepatosplenomegaly or other masses, no guarding or rebound  M/S:   muscle strength 5/5 all 4 ext., normal tone  NEURO:   Cranial nerves 2-12 are normal tested and grossly at patient's baseline, speech clear, no tremor  PSYCH:  insight and judgement impaired, memory impaired , affect abnormal -flat    Labs:     Most Recent 3 CBC's:Recent Labs   Lab Test 07/07/21  1404 06/24/21  0800 06/23/21  1758   WBC 3.8* 6.4 4.9   HGB 12.9* 13.4 13.1*   MCV 97 95 95   * 161 150     Most Recent 3 BMP's:Recent Labs   Lab Test 07/07/21  1404 06/24/21  0800 06/23/21  1758    136 142   POTASSIUM 4.2 4.3 3.6   CHLORIDE 111* 108 115*   CO2 25 23 21   BUN 34* 28 27   CR 1.96* 1.56* 1.46*   ANIONGAP 5 5 6   BRUCE 9.1 9.0 7.5*   * 84 88       ASSESSMENT/PLAN:  (K59.00) Constipation, unspecified constipation type  (primary encounter diagnosis)  Comment: occ increased s/s  Plan: 1. Cont. Miralax. Hold for loose stools  2. Encourage fluids  3. Encourage increased act. Level  4. For further increased s/s, consider senna    (F03.91) Dementia with behavioral disturbance, unspecified dementia type (H)  Comment: memory loss. Verbally aggressive at times towards staff.  Resistive with cares at times.  Ongoing exit-seeking behaivors  Plan: 1. Cont. Sched, prn seroquel  2. Cont. zyprexa  3. Cont. Hospice cares  4.  For target behaviors not responsive to staff redirection may add prn seroquel    (I10) Hypertension, unspecified type  Comment: BPs currently stable. Not currently taking BP med  Plan: 1. Follow BPs, HRs  2. Monitor fluid intake    Electronically signed by:  HENOK Dillon CNP                 Sincerely,        HENOK Dillon CNP

## 2021-09-24 NOTE — PROGRESS NOTES
[place] [facility]  [place] Medical Record Number:  [id number]  Place of Service where encounter took place:  [facility] ([facility]) [233]  Chief Complaint   Patient presents with     Constipation     Dementia       HPI:    Kofi Haddad  is a 88 year old (5/14/1933), who is being seen today for an episodic care visit.  HPI information obtained from: facility chart records, facility staff, patient report, [facility] chart review and family/first contact dtr report. Today's concern is:  Constipation, dementia, HTN.  For constipation cont. On miralax every day.  Per staff has occ increased s/s.  Reported last BM yesterday.  Has had adequate fluid intake.  Memory loss due to dementia.  H/o aggressive behaviors, exit-seeking behaviors.  Cont. On hospice cares.  Taking seroquel, zyprexa. Refuses walker at times.  No recent falls. Can be verbally aggressive towards staff, ongoing exit-seeking behaviors. H/o HTN.  Elevated BP 6/21 with ED visit. tx with hydralazine.  Left AMA. BPs, HRs stable. Not currently taking CV med.     Past Medical and Surgical History reviewed in Epic today.    MEDICATIONS:    Current Outpatient Medications   Medication Sig Dispense Refill     acetaminophen (TYLENOL) 325 MG tablet Take 2 tablets (650 mg) by mouth every 4 hours as needed for mild pain 60 tablet 0     calcium carbonate (TUMS) 500 MG chewable tablet Take 1 tablet (500 mg) by mouth 3 times daily as needed for heartburn 60 tablet 0     OLANZapine zydis (ZYPREXA) 5 MG ODT Take 1 tablet (5 mg) by mouth daily At 1500 30 tablet 0     polyethylene glycol (MIRALAX) 17 GM/Dose powder Take 17 g by mouth daily 510 g 0     QUEtiapine (SEROQUEL) 25 MG tablet Take 1 tablet (25 mg) by mouth daily (with breakfast) 30 tablet 0     QUEtiapine (SEROQUEL) 50 MG tablet Take 1 tablet (50 mg) by mouth every evening 30 tablet 0         REVIEW OF SYSTEMS:  Unobtainable secondary to cognitive impairment. Reports feeling ok  today    Objective:  /72   Pulse 81   Resp 16   SpO2 95%   Exam:  GENERAL APPEARANCE:  Alert, in no distress  ENT:  Mouth and posterior oropharynx normal, moist mucous membranes, [place]  EYES:  EOM, conjunctivae, lids, pupils and irises normal, PERRL  RESP:  respiratory effort and palpation of chest normal, lungs clear to auscultation , no respiratory distress  CV:  Palpation and auscultation of heart done , no edema, rate-normal, grade 3/6 murmur  ABDOMEN:  normal bowel sounds, soft, nontender, no hepatosplenomegaly or other masses, no guarding or rebound  M/S:   muscle strength 5/5 all 4 ext., normal tone  NEURO:   Cranial nerves 2-12 are normal tested and grossly at patient's baseline, speech clear, no tremor  PSYCH:  insight and judgement impaired, memory impaired , affect abnormal -flat    Labs:     Most Recent 3 CBC's:Recent Labs   Lab Test 07/07/21  1404 06/24/21  0800 06/23/21  1758   WBC 3.8* 6.4 4.9   HGB 12.9* 13.4 13.1*   MCV 97 95 95   * 161 150     Most Recent 3 BMP's:Recent Labs   Lab Test 07/07/21  1404 06/24/21  0800 06/23/21  1758    136 142   POTASSIUM 4.2 4.3 3.6   CHLORIDE 111* 108 115*   CO2 25 23 21   BUN 34* 28 27   CR 1.96* 1.56* 1.46*   ANIONGAP 5 5 6   BRUCE 9.1 9.0 7.5*   * 84 88       ASSESSMENT/PLAN:  (K59.00) Constipation, unspecified constipation type  (primary encounter diagnosis)  Comment: occ increased s/s  Plan: 1. Cont. Miralax. Hold for loose stools  2. Encourage fluids  3. Encourage increased act. Level  4. For further increased s/s, consider senna    (F03.91) Dementia with behavioral disturbance, unspecified dementia type (H)  Comment: memory loss. Verbally aggressive at times towards staff.  Resistive with cares at times.  Ongoing exit-seeking behaivors  Plan: 1. Cont. Sched, prn seroquel  2. Cont. zyprexa  3. Cont. Hospice cares  4. For target behaviors not responsive to staff redirection may add prn seroquel    (I10) Hypertension, unspecified  type  Comment: BPs currently stable. Not currently taking BP med  Plan: 1. Follow BPs, HRs  2. Monitor fluid intake    Electronically signed by:  HENOK Dillon CNP

## 2021-10-06 NOTE — LETTER
10/6/2021        RE: Kofi Pérez [facility]  [address]  [address]  [facility] [address]        [facility]  [place] [facility] Record Number:  [id number]  Place of Service where encounter took place:  [facility] (skilled nursing) [233]  Chief Complaint   Patient presents with     Seizures       HPI:    Kofi Haddad  is a 88 year old (5/14/1933), who is being seen today for an episodic care visit.  HPI information obtained from: facility chart records, facility staff, patient report and [facility] chart review. Today's concern is: seizure, dementia, fatigue.  Cont. On hospice cares.  Had unresponsive episode with eyes rolled back, yesterday.  Has h/o seizures.  No recent reports of seizure activity. Prn ativan started. Memory loss due to dementia. Cont. To have agitation at times, exit-seeking behaviors.  Cont on seroquel, zyprexa. Has had increased fatigue past few days.  Decrease in act. Level, decreased amb.  Has sever aortic stenosis. LE edema stable.  O2 sats have been stable RA.       Past Medical and Surgical History reviewed in Epic today.    MEDICATIONS:    Current Outpatient Medications   Medication Sig Dispense Refill     albuterol (PROVENTIL) (5 MG/ML) 0.5% neb solution Take 5 mg by nebulization every 4 hours as needed for wheezing or shortness of breath / dyspnea       ipratropium - albuterol 0.5 mg/2.5 mg/3 mL (DUONEB) 0.5-2.5 (3) MG/3ML neb solution Take 1 vial by nebulization 3 times daily       LORazepam (ATIVAN) 0.5 MG tablet Take 0.5 mg by mouth every 4 hours as needed for anxiety       morphine 2.5 MG solu-tab Take 2.5 mg by mouth every hour as needed for shortness of breath / dyspnea or moderate to severe pain       acetaminophen (TYLENOL) 325 MG tablet Take 2 tablets (650 mg) by mouth every 4 hours as needed for mild pain 60 tablet 0     calcium carbonate (TUMS) 500 MG chewable tablet Take 1 tablet (500 mg) by mouth 3 times daily as  needed for heartburn 60 tablet 0     OLANZapine zydis (ZYPREXA) 5 MG ODT Take 1 tablet (5 mg) by mouth daily At 1500 30 tablet 0     polyethylene glycol (MIRALAX) 17 GM/Dose powder Take 17 g by mouth daily 510 g 0     QUEtiapine (SEROQUEL) 25 MG tablet Take 1 tablet (25 mg) by mouth daily (with breakfast) 30 tablet 0     QUEtiapine (SEROQUEL) 50 MG tablet Take 1 tablet (50 mg) by mouth every evening 30 tablet 0         REVIEW OF SYSTEMS:  Unobtainable secondary to cognitive impairment. Reports feeling ok today    Objective:  /67   Pulse 78   Temp 97.7  F (36.5  C)   Resp 18   SpO2 93%   Exam:  GENERAL APPEARANCE:  Alert, in no distress  ENT:  Mouth and posterior oropharynx normal, moist mucous membranes, [place]  EYES:  EOM, conjunctivae, lids, pupils and irises normal, PERRL  RESP:  respiratory effort and palpation of chest normal, lungs clear to auscultation , no respiratory distress, diminished breath sounds bibasilar  CV:  Palpation and auscultation of heart done , regular rate and rhythm, no murmur, rub, or gallop, peripheral edema trace-1+ in LEs  ABDOMEN:  normal bowel sounds, soft, nontender, no hepatosplenomegaly or other masses, no guarding or rebound  M/S:   muscle strength 5/5 UEs, gen. LE weakness  NEURO:   Cranial nerves 2-12 are normal tested and grossly at patient's baseline, no tremor  PSYCH:  insight and judgement impaired, memory impaired , affect abnormal -flat    Labs:   no recent labs    ASSESSMENT/PLAN:  (G40.908) Seizure disorder (H)  (primary encounter diagnosis)  Comment: recent seizure-like activity.  No episodes noted since  Plan: 1. Cont. Prn ativan  2. Monitor for further seizure activity  3. For above, may consider sched. Ativan  4. Cont. Hospice cares    (F03.91) Dementia with behavioral disturbance, unspecified dementia type (H)  Comment: memory loss. Ongoing agitation at times, exit-seeking behaviors  Plan: 1. Cont. seroquel  2. Cont. zyprexa  3. For increased agitation  utilize prn ativan  4. Cont. Secured memory care unit    (R53.83) Fatigue, unspecified type  Comment: increased past week. H/o aortic stenosis  Plan: 1. Follow O2 sats  2. Monitor for increased LE edema  3. Assist with amb. As needed  4. Cont. Prn MS for resp. distress    Electronically signed by:  HENOK Dillon CNP                 Sincerely,        HENOK Dillon CNP

## 2021-10-06 NOTE — PROGRESS NOTES
[facility]  [place] [facility] Record Number:  [id number]  Place of Service where encounter took place:  [facility] ([facility]) [233]  Chief Complaint   Patient presents with     Seizures       HPI:    Kofi Haddad  is a 88 year old (5/14/1933), who is being seen today for an episodic care visit.  HPI information obtained from: facility chart records, facility staff, patient report and [facility] chart review. Today's concern is: seizure, dementia, fatigue.  Cont. On hospice cares.  Had unresponsive episode with eyes rolled back, yesterday.  Has h/o seizures.  No recent reports of seizure activity. Prn ativan started. Memory loss due to dementia. Cont. To have agitation at times, exit-seeking behaviors.  Cont on seroquel, zyprexa. Has had increased fatigue past few days.  Decrease in act. Level, decreased amb.  Has sever aortic stenosis. LE edema stable.  O2 sats have been stable RA.       Past Medical and Surgical History reviewed in Epic today.    MEDICATIONS:    Current Outpatient Medications   Medication Sig Dispense Refill     albuterol (PROVENTIL) (5 MG/ML) 0.5% neb solution Take 5 mg by nebulization every 4 hours as needed for wheezing or shortness of breath / dyspnea       ipratropium - albuterol 0.5 mg/2.5 mg/3 mL (DUONEB) 0.5-2.5 (3) MG/3ML neb solution Take 1 vial by nebulization 3 times daily       LORazepam (ATIVAN) 0.5 MG tablet Take 0.5 mg by mouth every 4 hours as needed for anxiety       morphine 2.5 MG solu-tab Take 2.5 mg by mouth every hour as needed for shortness of breath / dyspnea or moderate to severe pain       acetaminophen (TYLENOL) 325 MG tablet Take 2 tablets (650 mg) by mouth every 4 hours as needed for mild pain 60 tablet 0     calcium carbonate (TUMS) 500 MG chewable tablet Take 1 tablet (500 mg) by mouth 3 times daily as needed for heartburn 60 tablet 0     OLANZapine zydis (ZYPREXA) 5 MG ODT Take 1 tablet (5 mg) by mouth daily At 1500 30 tablet 0      polyethylene glycol (MIRALAX) 17 GM/Dose powder Take 17 g by mouth daily 510 g 0     QUEtiapine (SEROQUEL) 25 MG tablet Take 1 tablet (25 mg) by mouth daily (with breakfast) 30 tablet 0     QUEtiapine (SEROQUEL) 50 MG tablet Take 1 tablet (50 mg) by mouth every evening 30 tablet 0         REVIEW OF SYSTEMS:  Unobtainable secondary to cognitive impairment. Reports feeling ok today    Objective:  /67   Pulse 78   Temp 97.7  F (36.5  C)   Resp 18   SpO2 93%   Exam:  GENERAL APPEARANCE:  Alert, in no distress  ENT:  Mouth and posterior oropharynx normal, moist mucous membranes, [place]  EYES:  EOM, conjunctivae, lids, pupils and irises normal, PERRL  RESP:  respiratory effort and palpation of chest normal, lungs clear to auscultation , no respiratory distress, diminished breath sounds bibasilar  CV:  Palpation and auscultation of heart done , regular rate and rhythm, no murmur, rub, or gallop, peripheral edema trace-1+ in LEs  ABDOMEN:  normal bowel sounds, soft, nontender, no hepatosplenomegaly or other masses, no guarding or rebound  M/S:   muscle strength 5/5 UEs, gen. LE weakness  NEURO:   Cranial nerves 2-12 are normal tested and grossly at patient's baseline, no tremor  PSYCH:  insight and judgement impaired, memory impaired , affect abnormal -flat    Labs:   no recent labs    ASSESSMENT/PLAN:  (G40.909) Seizure disorder (H)  (primary encounter diagnosis)  Comment: recent seizure-like activity.  No episodes noted since  Plan: 1. Cont. Prn ativan  2. Monitor for further seizure activity  3. For above, may consider sched. Ativan  4. Cont. Hospice cares    (F03.91) Dementia with behavioral disturbance, unspecified dementia type (H)  Comment: memory loss. Ongoing agitation at times, exit-seeking behaviors  Plan: 1. Cont. seroquel  2. Cont. zyprexa  3. For increased agitation utilize prn ativan  4. Cont. Secured memory care unit    (R53.83) Fatigue, unspecified type  Comment: increased past week. H/o aortic  stenosis  Plan: 1. Follow O2 sats  2. Monitor for increased LE edema  3. Assist with amb. As needed  4. Cont. Prn MS for resp. distress    Electronically signed by:  HENOK Dillon CNP